# Patient Record
Sex: FEMALE | Race: WHITE | NOT HISPANIC OR LATINO
[De-identification: names, ages, dates, MRNs, and addresses within clinical notes are randomized per-mention and may not be internally consistent; named-entity substitution may affect disease eponyms.]

---

## 2017-01-05 ENCOUNTER — RX RENEWAL (OUTPATIENT)
Age: 82
End: 2017-01-05

## 2017-01-09 ENCOUNTER — LABORATORY RESULT (OUTPATIENT)
Age: 82
End: 2017-01-09

## 2017-01-30 ENCOUNTER — MEDICATION RENEWAL (OUTPATIENT)
Age: 82
End: 2017-01-30

## 2017-02-02 ENCOUNTER — MEDICATION RENEWAL (OUTPATIENT)
Age: 82
End: 2017-02-02

## 2017-02-08 ENCOUNTER — RESULT REVIEW (OUTPATIENT)
Age: 82
End: 2017-02-08

## 2017-02-08 ENCOUNTER — LABORATORY RESULT (OUTPATIENT)
Age: 82
End: 2017-02-08

## 2017-02-08 LAB
INR PPP: 1.8
PT BLD: 21.9

## 2017-03-01 ENCOUNTER — NON-APPOINTMENT (OUTPATIENT)
Age: 82
End: 2017-03-01

## 2017-03-01 ENCOUNTER — APPOINTMENT (OUTPATIENT)
Dept: CARDIOLOGY | Facility: CLINIC | Age: 82
End: 2017-03-01

## 2017-03-01 VITALS
BODY MASS INDEX: 26.46 KG/M2 | WEIGHT: 155 LBS | SYSTOLIC BLOOD PRESSURE: 110 MMHG | HEIGHT: 64 IN | TEMPERATURE: 98.8 F | HEART RATE: 56 BPM | OXYGEN SATURATION: 94 % | DIASTOLIC BLOOD PRESSURE: 56 MMHG

## 2017-03-08 ENCOUNTER — LABORATORY RESULT (OUTPATIENT)
Age: 82
End: 2017-03-08

## 2017-03-09 LAB — INR PPP: 2.5

## 2017-04-05 ENCOUNTER — LABORATORY RESULT (OUTPATIENT)
Age: 82
End: 2017-04-05

## 2017-04-23 ENCOUNTER — RX RENEWAL (OUTPATIENT)
Age: 82
End: 2017-04-23

## 2017-05-08 ENCOUNTER — LABORATORY RESULT (OUTPATIENT)
Age: 82
End: 2017-05-08

## 2017-05-12 DIAGNOSIS — R07.9 CHEST PAIN, UNSPECIFIED: ICD-10-CM

## 2017-06-20 LAB — INR PPP: 2

## 2017-07-18 ENCOUNTER — APPOINTMENT (OUTPATIENT)
Dept: CARDIOLOGY | Facility: CLINIC | Age: 82
End: 2017-07-18

## 2017-07-18 VITALS
SYSTOLIC BLOOD PRESSURE: 150 MMHG | HEART RATE: 59 BPM | WEIGHT: 155 LBS | HEIGHT: 64 IN | DIASTOLIC BLOOD PRESSURE: 72 MMHG | OXYGEN SATURATION: 98 % | BODY MASS INDEX: 26.46 KG/M2 | TEMPERATURE: 98.8 F

## 2017-07-18 LAB — INR PPP: 2 RATIO

## 2017-08-18 ENCOUNTER — LABORATORY RESULT (OUTPATIENT)
Age: 82
End: 2017-08-18

## 2017-08-23 ENCOUNTER — OUTPATIENT (OUTPATIENT)
Dept: OUTPATIENT SERVICES | Facility: HOSPITAL | Age: 82
LOS: 1 days | End: 2017-08-23
Payer: MEDICARE

## 2017-08-23 ENCOUNTER — APPOINTMENT (OUTPATIENT)
Dept: MAMMOGRAPHY | Facility: HOSPITAL | Age: 82
End: 2017-08-23
Payer: MEDICARE

## 2017-08-23 ENCOUNTER — APPOINTMENT (OUTPATIENT)
Dept: ULTRASOUND IMAGING | Facility: HOSPITAL | Age: 82
End: 2017-08-23
Payer: MEDICARE

## 2017-08-23 PROCEDURE — 77063 BREAST TOMOSYNTHESIS BI: CPT | Mod: 26

## 2017-08-23 PROCEDURE — 77067 SCR MAMMO BI INCL CAD: CPT

## 2017-08-23 PROCEDURE — 76641 ULTRASOUND BREAST COMPLETE: CPT

## 2017-08-23 PROCEDURE — G0202: CPT | Mod: 26

## 2017-08-23 PROCEDURE — 76641 ULTRASOUND BREAST COMPLETE: CPT | Mod: 26

## 2017-08-23 PROCEDURE — 77063 BREAST TOMOSYNTHESIS BI: CPT

## 2017-09-18 ENCOUNTER — LABORATORY RESULT (OUTPATIENT)
Age: 82
End: 2017-09-18

## 2017-10-19 ENCOUNTER — LABORATORY RESULT (OUTPATIENT)
Age: 82
End: 2017-10-19

## 2017-10-23 ENCOUNTER — RX RENEWAL (OUTPATIENT)
Age: 82
End: 2017-10-23

## 2017-11-01 ENCOUNTER — APPOINTMENT (OUTPATIENT)
Dept: CARDIOLOGY | Facility: CLINIC | Age: 82
End: 2017-11-01
Payer: MEDICARE

## 2017-11-01 ENCOUNTER — NON-APPOINTMENT (OUTPATIENT)
Age: 82
End: 2017-11-01

## 2017-11-01 VITALS
HEIGHT: 64 IN | HEART RATE: 66 BPM | DIASTOLIC BLOOD PRESSURE: 70 MMHG | WEIGHT: 158 LBS | BODY MASS INDEX: 26.98 KG/M2 | OXYGEN SATURATION: 99 % | SYSTOLIC BLOOD PRESSURE: 119 MMHG | TEMPERATURE: 98.2 F

## 2017-11-01 PROCEDURE — 99214 OFFICE O/P EST MOD 30 MIN: CPT

## 2017-11-01 PROCEDURE — 93000 ELECTROCARDIOGRAM COMPLETE: CPT

## 2017-11-02 LAB
ALBUMIN SERPL ELPH-MCNC: 4.3 G/DL
ALP BLD-CCNC: 86 U/L
ALT SERPL-CCNC: 30 U/L
ANION GAP SERPL CALC-SCNC: 16 MMOL/L
AST SERPL-CCNC: 41 U/L
BASOPHILS # BLD AUTO: 0.03 K/UL
BASOPHILS NFR BLD AUTO: 0.5 %
BILIRUB SERPL-MCNC: 1.1 MG/DL
BUN SERPL-MCNC: 18 MG/DL
CALCIUM SERPL-MCNC: 10.1 MG/DL
CHLORIDE SERPL-SCNC: 100 MMOL/L
CHOLEST SERPL-MCNC: 224 MG/DL
CHOLEST/HDLC SERPL: 3.5 RATIO
CO2 SERPL-SCNC: 24 MMOL/L
CREAT SERPL-MCNC: 1.04 MG/DL
EOSINOPHIL # BLD AUTO: 0.13 K/UL
EOSINOPHIL NFR BLD AUTO: 2 %
GLUCOSE SERPL-MCNC: 89 MG/DL
HBA1C MFR BLD HPLC: 5.9 %
HCT VFR BLD CALC: 40 %
HDLC SERPL-MCNC: 64 MG/DL
HGB BLD-MCNC: 13 G/DL
IMM GRANULOCYTES NFR BLD AUTO: 0.2 %
LDLC SERPL CALC-MCNC: 124 MG/DL
LYMPHOCYTES # BLD AUTO: 1.89 K/UL
LYMPHOCYTES NFR BLD AUTO: 28.9 %
MAN DIFF?: NORMAL
MCHC RBC-ENTMCNC: 31.1 PG
MCHC RBC-ENTMCNC: 32.5 GM/DL
MCV RBC AUTO: 95.7 FL
MONOCYTES # BLD AUTO: 0.72 K/UL
MONOCYTES NFR BLD AUTO: 11 %
NEUTROPHILS # BLD AUTO: 3.75 K/UL
NEUTROPHILS NFR BLD AUTO: 57.4 %
PLATELET # BLD AUTO: 206 K/UL
POTASSIUM SERPL-SCNC: 5.2 MMOL/L
PROT SERPL-MCNC: 7.3 G/DL
RBC # BLD: 4.18 M/UL
RBC # FLD: 15 %
SODIUM SERPL-SCNC: 140 MMOL/L
TRIGL SERPL-MCNC: 182 MG/DL
TSH SERPL-ACNC: 1.61 UIU/ML
WBC # FLD AUTO: 6.53 K/UL

## 2017-11-15 ENCOUNTER — LABORATORY RESULT (OUTPATIENT)
Age: 82
End: 2017-11-15

## 2017-12-14 ENCOUNTER — RX RENEWAL (OUTPATIENT)
Age: 82
End: 2017-12-14

## 2017-12-19 ENCOUNTER — LABORATORY RESULT (OUTPATIENT)
Age: 82
End: 2017-12-19

## 2018-01-16 ENCOUNTER — LABORATORY RESULT (OUTPATIENT)
Age: 83
End: 2018-01-16

## 2018-01-22 ENCOUNTER — RX RENEWAL (OUTPATIENT)
Age: 83
End: 2018-01-22

## 2018-02-14 ENCOUNTER — LABORATORY RESULT (OUTPATIENT)
Age: 83
End: 2018-02-14

## 2018-03-06 ENCOUNTER — APPOINTMENT (OUTPATIENT)
Dept: CARDIOLOGY | Facility: CLINIC | Age: 83
End: 2018-03-06
Payer: MEDICARE

## 2018-03-06 ENCOUNTER — NON-APPOINTMENT (OUTPATIENT)
Age: 83
End: 2018-03-06

## 2018-03-06 VITALS
HEIGHT: 64 IN | TEMPERATURE: 99 F | HEART RATE: 61 BPM | DIASTOLIC BLOOD PRESSURE: 64 MMHG | OXYGEN SATURATION: 98 % | SYSTOLIC BLOOD PRESSURE: 146 MMHG | WEIGHT: 160 LBS | BODY MASS INDEX: 27.31 KG/M2

## 2018-03-06 VITALS — SYSTOLIC BLOOD PRESSURE: 140 MMHG | DIASTOLIC BLOOD PRESSURE: 80 MMHG

## 2018-03-06 LAB — INR PPP: 3.7 RATIO

## 2018-03-06 PROCEDURE — 93000 ELECTROCARDIOGRAM COMPLETE: CPT

## 2018-03-06 PROCEDURE — 99214 OFFICE O/P EST MOD 30 MIN: CPT

## 2018-03-06 PROCEDURE — 85610 PROTHROMBIN TIME: CPT | Mod: QW

## 2018-03-06 PROCEDURE — 36416 COLLJ CAPILLARY BLOOD SPEC: CPT

## 2018-03-13 ENCOUNTER — LABORATORY RESULT (OUTPATIENT)
Age: 83
End: 2018-03-13

## 2018-04-13 ENCOUNTER — LABORATORY RESULT (OUTPATIENT)
Age: 83
End: 2018-04-13

## 2018-04-16 ENCOUNTER — RX RENEWAL (OUTPATIENT)
Age: 83
End: 2018-04-16

## 2018-04-24 ENCOUNTER — MEDICATION RENEWAL (OUTPATIENT)
Age: 83
End: 2018-04-24

## 2018-04-26 ENCOUNTER — MEDICATION RENEWAL (OUTPATIENT)
Age: 83
End: 2018-04-26

## 2018-05-09 ENCOUNTER — LABORATORY RESULT (OUTPATIENT)
Age: 83
End: 2018-05-09

## 2018-06-12 ENCOUNTER — LABORATORY RESULT (OUTPATIENT)
Age: 83
End: 2018-06-12

## 2018-07-06 ENCOUNTER — MEDICATION RENEWAL (OUTPATIENT)
Age: 83
End: 2018-07-06

## 2018-07-11 ENCOUNTER — LABORATORY RESULT (OUTPATIENT)
Age: 83
End: 2018-07-11

## 2018-07-26 ENCOUNTER — APPOINTMENT (OUTPATIENT)
Dept: CARDIOLOGY | Facility: CLINIC | Age: 83
End: 2018-07-26
Payer: MEDICARE

## 2018-07-26 ENCOUNTER — NON-APPOINTMENT (OUTPATIENT)
Age: 83
End: 2018-07-26

## 2018-07-26 VITALS
HEIGHT: 64 IN | OXYGEN SATURATION: 95 % | WEIGHT: 154 LBS | BODY MASS INDEX: 26.29 KG/M2 | HEART RATE: 66 BPM | DIASTOLIC BLOOD PRESSURE: 72 MMHG | SYSTOLIC BLOOD PRESSURE: 120 MMHG

## 2018-07-26 PROCEDURE — 85610 PROTHROMBIN TIME: CPT | Mod: QW

## 2018-07-26 PROCEDURE — 93306 TTE W/DOPPLER COMPLETE: CPT

## 2018-07-26 PROCEDURE — 99214 OFFICE O/P EST MOD 30 MIN: CPT

## 2018-07-26 PROCEDURE — 93000 ELECTROCARDIOGRAM COMPLETE: CPT

## 2018-07-26 PROCEDURE — 36416 COLLJ CAPILLARY BLOOD SPEC: CPT

## 2018-08-06 ENCOUNTER — OUTPATIENT (OUTPATIENT)
Dept: OUTPATIENT SERVICES | Facility: HOSPITAL | Age: 83
LOS: 1 days | End: 2018-08-06
Payer: MEDICARE

## 2018-08-06 DIAGNOSIS — R73.03 PREDIABETES: ICD-10-CM

## 2018-08-06 PROCEDURE — G0463: CPT

## 2018-08-28 ENCOUNTER — APPOINTMENT (OUTPATIENT)
Dept: MAMMOGRAPHY | Facility: HOSPITAL | Age: 83
End: 2018-08-28
Payer: MEDICARE

## 2018-08-28 ENCOUNTER — LABORATORY RESULT (OUTPATIENT)
Age: 83
End: 2018-08-28

## 2018-08-28 ENCOUNTER — OUTPATIENT (OUTPATIENT)
Dept: OUTPATIENT SERVICES | Facility: HOSPITAL | Age: 83
LOS: 1 days | End: 2018-08-28
Payer: MEDICARE

## 2018-08-28 ENCOUNTER — APPOINTMENT (OUTPATIENT)
Dept: ULTRASOUND IMAGING | Facility: HOSPITAL | Age: 83
End: 2018-08-28
Payer: MEDICARE

## 2018-08-28 DIAGNOSIS — Z00.8 ENCOUNTER FOR OTHER GENERAL EXAMINATION: ICD-10-CM

## 2018-08-28 PROCEDURE — 76641 ULTRASOUND BREAST COMPLETE: CPT | Mod: 26

## 2018-08-28 PROCEDURE — 77067 SCR MAMMO BI INCL CAD: CPT

## 2018-08-28 PROCEDURE — 77063 BREAST TOMOSYNTHESIS BI: CPT | Mod: 26

## 2018-08-28 PROCEDURE — 77067 SCR MAMMO BI INCL CAD: CPT | Mod: 26

## 2018-08-28 PROCEDURE — 77063 BREAST TOMOSYNTHESIS BI: CPT

## 2018-08-28 PROCEDURE — 76641 ULTRASOUND BREAST COMPLETE: CPT

## 2018-09-21 ENCOUNTER — APPOINTMENT (OUTPATIENT)
Dept: ORTHOPEDIC SURGERY | Facility: CLINIC | Age: 83
End: 2018-09-21
Payer: MEDICARE

## 2018-09-21 ENCOUNTER — LABORATORY RESULT (OUTPATIENT)
Age: 83
End: 2018-09-21

## 2018-09-21 VITALS — WEIGHT: 154 LBS | BODY MASS INDEX: 26.29 KG/M2 | HEIGHT: 64 IN

## 2018-09-21 DIAGNOSIS — M19.011 PRIMARY OSTEOARTHRITIS, RIGHT SHOULDER: ICD-10-CM

## 2018-09-21 DIAGNOSIS — M46.92 UNSPECIFIED INFLAMMATORY SPONDYLOPATHY, CERVICAL REGION: ICD-10-CM

## 2018-09-21 PROCEDURE — 99204 OFFICE O/P NEW MOD 45 MIN: CPT

## 2018-09-21 PROCEDURE — 73030 X-RAY EXAM OF SHOULDER: CPT | Mod: RT

## 2018-09-21 PROCEDURE — 99214 OFFICE O/P EST MOD 30 MIN: CPT

## 2018-11-07 ENCOUNTER — APPOINTMENT (OUTPATIENT)
Dept: CARDIOLOGY | Facility: CLINIC | Age: 83
End: 2018-11-07
Payer: MEDICARE

## 2018-11-07 ENCOUNTER — NON-APPOINTMENT (OUTPATIENT)
Age: 83
End: 2018-11-07

## 2018-11-07 VITALS
HEART RATE: 67 BPM | SYSTOLIC BLOOD PRESSURE: 103 MMHG | WEIGHT: 148 LBS | BODY MASS INDEX: 25.4 KG/M2 | DIASTOLIC BLOOD PRESSURE: 60 MMHG | OXYGEN SATURATION: 97 %

## 2018-11-07 DIAGNOSIS — R92.2 INCONCLUSIVE MAMMOGRAM: ICD-10-CM

## 2018-11-07 PROCEDURE — 36415 COLL VENOUS BLD VENIPUNCTURE: CPT

## 2018-11-07 PROCEDURE — 99214 OFFICE O/P EST MOD 30 MIN: CPT

## 2018-11-07 PROCEDURE — 93000 ELECTROCARDIOGRAM COMPLETE: CPT

## 2018-11-07 NOTE — REVIEW OF SYSTEMS
[Palpitations] : palpitations [Joint Pain] : joint pain [Negative] : Heme/Lymph [Feeling Fatigued] : not feeling fatigued

## 2018-11-07 NOTE — HISTORY OF PRESENT ILLNESS
[FreeTextEntry1] : 83-year-old female with a history of CAD, paroxysmal atrial fibrillation, [employer] aortic valve replacement, hypertension, and hypercholesterolemia.  She is feeling generally well and has no cardiac complaints.

## 2018-11-07 NOTE — DISCUSSION/SUMMARY
[FreeTextEntry1] : Mrs. Gillespie has no complaints and seems unchanged. Her exam shows regular rhythm, normal blood pressure, clicks from her aortic prosthesis, and no evidence of CHF. Her EKG shows sinus rhythm with some APCs and low voltage. She is stable and doing well and I made no changes in her regimen. Blood work was drawn. She will follow in 4 months.

## 2018-11-07 NOTE — PHYSICAL EXAM
[General Appearance - Well Developed] : well developed [Normal Appearance] : normal appearance [Well Groomed] : well groomed [General Appearance - Well Nourished] : well nourished [No Deformities] : no deformities [General Appearance - In No Acute Distress] : no acute distress [Normal Conjunctiva] : the conjunctiva exhibited no abnormalities [Eyelids - No Xanthelasma] : the eyelids demonstrated no xanthelasmas [Normal Oral Mucosa] : normal oral mucosa [No Oral Pallor] : no oral pallor [No Oral Cyanosis] : no oral cyanosis [Normal Jugular Venous A Waves Present] : normal jugular venous A waves present [Normal Jugular Venous V Waves Present] : normal jugular venous V waves present [No Jugular Venous Webb A Waves] : no jugular venous webb A waves [Respiration, Rhythm And Depth] : normal respiratory rhythm and effort [Exaggerated Use Of Accessory Muscles For Inspiration] : no accessory muscle use [Auscultation Breath Sounds / Voice Sounds] : lungs were clear to auscultation bilaterally [Heart Rate And Rhythm] : heart rate and rhythm were normal [Heart Sounds] : normal S1 and S2 [Murmurs] : no murmurs present [Edema] : no peripheral edema present [Abdomen Soft] : soft [Abdomen Tenderness] : non-tender [Abdomen Mass (___ Cm)] : no abdominal mass palpated [Abnormal Walk] : normal gait [Gait - Sufficient For Exercise Testing] : the gait was sufficient for exercise testing [Nail Clubbing] : no clubbing of the fingernails [Cyanosis, Localized] : no localized cyanosis [Petechial Hemorrhages (___cm)] : no petechial hemorrhages [Skin Color & Pigmentation] : normal skin color and pigmentation [] : no rash [No Venous Stasis] : no venous stasis [Skin Lesions] : no skin lesions [No Skin Ulcers] : no skin ulcer [No Xanthoma] : no  xanthoma was observed [Oriented To Time, Place, And Person] : oriented to person, place, and time [Affect] : the affect was normal [Mood] : the mood was normal [No Anxiety] : not feeling anxious [FreeTextEntry1] : Closing click of [facility] prosthesis

## 2018-11-08 LAB
ALBUMIN SERPL ELPH-MCNC: 4.2 G/DL
ALP BLD-CCNC: 85 U/L
ALT SERPL-CCNC: 23 U/L
ANION GAP SERPL CALC-SCNC: 14 MMOL/L
AST SERPL-CCNC: 29 U/L
BASOPHILS # BLD AUTO: 0.03 K/UL
BASOPHILS NFR BLD AUTO: 0.5 %
BILIRUB SERPL-MCNC: 1 MG/DL
BUN SERPL-MCNC: 18 MG/DL
CALCIUM SERPL-MCNC: 9.9 MG/DL
CHLORIDE SERPL-SCNC: 101 MMOL/L
CHOLEST SERPL-MCNC: 185 MG/DL
CHOLEST/HDLC SERPL: 3.2 RATIO
CO2 SERPL-SCNC: 24 MMOL/L
CREAT SERPL-MCNC: 0.9 MG/DL
EOSINOPHIL # BLD AUTO: 0.13 K/UL
EOSINOPHIL NFR BLD AUTO: 2 %
GLUCOSE SERPL-MCNC: 107 MG/DL
HBA1C MFR BLD HPLC: 5.9 %
HCT VFR BLD CALC: 36.3 %
HDLC SERPL-MCNC: 57 MG/DL
HGB BLD-MCNC: 11.8 G/DL
IMM GRANULOCYTES NFR BLD AUTO: 0.2 %
INR PPP: 2.27 RATIO
LDLC SERPL CALC-MCNC: 106 MG/DL
LYMPHOCYTES # BLD AUTO: 1.75 K/UL
LYMPHOCYTES NFR BLD AUTO: 27.3 %
MAN DIFF?: NORMAL
MCHC RBC-ENTMCNC: 30.2 PG
MCHC RBC-ENTMCNC: 32.5 GM/DL
MCV RBC AUTO: 92.8 FL
MONOCYTES # BLD AUTO: 0.67 K/UL
MONOCYTES NFR BLD AUTO: 10.5 %
NEUTROPHILS # BLD AUTO: 3.81 K/UL
NEUTROPHILS NFR BLD AUTO: 59.5 %
PLATELET # BLD AUTO: 202 K/UL
POTASSIUM SERPL-SCNC: 4.9 MMOL/L
PROT SERPL-MCNC: 6.9 G/DL
PT BLD: 26.6 SEC
RBC # BLD: 3.91 M/UL
RBC # FLD: 14.9 %
SODIUM SERPL-SCNC: 139 MMOL/L
TRIGL SERPL-MCNC: 111 MG/DL
TSH SERPL-ACNC: 0.91 UIU/ML
WBC # FLD AUTO: 6.4 K/UL

## 2018-11-16 ENCOUNTER — APPOINTMENT (OUTPATIENT)
Dept: ORTHOPEDIC SURGERY | Facility: CLINIC | Age: 83
End: 2018-11-16
Payer: MEDICARE

## 2018-11-16 VITALS — HEIGHT: 64 IN | BODY MASS INDEX: 25.27 KG/M2 | WEIGHT: 148 LBS

## 2018-11-16 DIAGNOSIS — M19.019 PRIMARY OSTEOARTHRITIS, UNSPECIFIED SHOULDER: ICD-10-CM

## 2018-11-16 DIAGNOSIS — M75.41 IMPINGEMENT SYNDROME OF RIGHT SHOULDER: ICD-10-CM

## 2018-11-16 PROCEDURE — 73030 X-RAY EXAM OF SHOULDER: CPT | Mod: RT

## 2018-11-16 PROCEDURE — 99213 OFFICE O/P EST LOW 20 MIN: CPT | Mod: 25

## 2018-11-16 PROCEDURE — 20610 DRAIN/INJ JOINT/BURSA W/O US: CPT | Mod: RT

## 2018-12-10 ENCOUNTER — LABORATORY RESULT (OUTPATIENT)
Age: 83
End: 2018-12-10

## 2018-12-21 ENCOUNTER — APPOINTMENT (OUTPATIENT)
Dept: ORTHOPEDIC SURGERY | Facility: CLINIC | Age: 83
End: 2018-12-21

## 2018-12-21 ENCOUNTER — LABORATORY RESULT (OUTPATIENT)
Age: 83
End: 2018-12-21

## 2019-01-03 ENCOUNTER — RX RENEWAL (OUTPATIENT)
Age: 84
End: 2019-01-03

## 2019-01-18 ENCOUNTER — RX RENEWAL (OUTPATIENT)
Age: 84
End: 2019-01-18

## 2019-02-15 ENCOUNTER — MEDICATION RENEWAL (OUTPATIENT)
Age: 84
End: 2019-02-15

## 2019-03-04 ENCOUNTER — RESULT CHARGE (OUTPATIENT)
Age: 84
End: 2019-03-04

## 2019-03-05 ENCOUNTER — NON-APPOINTMENT (OUTPATIENT)
Age: 84
End: 2019-03-05

## 2019-03-05 ENCOUNTER — OTHER (OUTPATIENT)
Age: 84
End: 2019-03-05

## 2019-03-05 ENCOUNTER — CHART COPY (OUTPATIENT)
Age: 84
End: 2019-03-05

## 2019-03-05 ENCOUNTER — APPOINTMENT (OUTPATIENT)
Dept: CARDIOLOGY | Facility: CLINIC | Age: 84
End: 2019-03-05
Payer: MEDICARE

## 2019-03-05 ENCOUNTER — LABORATORY RESULT (OUTPATIENT)
Age: 84
End: 2019-03-05

## 2019-03-05 VITALS
OXYGEN SATURATION: 98 % | HEIGHT: 64 IN | BODY MASS INDEX: 24.41 KG/M2 | SYSTOLIC BLOOD PRESSURE: 120 MMHG | DIASTOLIC BLOOD PRESSURE: 67 MMHG | WEIGHT: 143 LBS | HEART RATE: 58 BPM

## 2019-03-05 VITALS — SYSTOLIC BLOOD PRESSURE: 130 MMHG | DIASTOLIC BLOOD PRESSURE: 70 MMHG

## 2019-03-05 PROCEDURE — 93000 ELECTROCARDIOGRAM COMPLETE: CPT

## 2019-03-05 PROCEDURE — 36415 COLL VENOUS BLD VENIPUNCTURE: CPT

## 2019-03-05 PROCEDURE — 99214 OFFICE O/P EST MOD 30 MIN: CPT

## 2019-03-05 RX ORDER — MECLIZINE HYDROCHLORIDE 12.5 MG/1
12.5 TABLET ORAL 3 TIMES DAILY
Qty: 25 | Refills: 3 | Status: ACTIVE | COMMUNITY
Start: 2017-11-01 | End: 1900-01-01

## 2019-03-05 NOTE — DISCUSSION/SUMMARY
[FreeTextEntry1] : Mrs. Gillespie remains without cardiac symptoms and is unchanged. Her exam shows normal blood pressure, clear lungs, and unchanged cardiac exam, and no evidence of CHF. Her EKG shows normal sinus rhythm with poor R-wave progression and is unchanged. She stabilized made no changes in her regimen. Blood work was drawn.  She will follow in 4 months.

## 2019-03-06 LAB
ANION GAP SERPL CALC-SCNC: 13 MMOL/L
BUN SERPL-MCNC: 16 MG/DL
CALCIUM SERPL-MCNC: 9.8 MG/DL
CHLORIDE SERPL-SCNC: 102 MMOL/L
CHOLEST SERPL-MCNC: 174 MG/DL
CHOLEST/HDLC SERPL: 3.6 RATIO
CO2 SERPL-SCNC: 25 MMOL/L
CREAT SERPL-MCNC: 0.92 MG/DL
GLUCOSE SERPL-MCNC: 106 MG/DL
HDLC SERPL-MCNC: 49 MG/DL
LDLC SERPL CALC-MCNC: 96 MG/DL
POTASSIUM SERPL-SCNC: 4.5 MMOL/L
SODIUM SERPL-SCNC: 140 MMOL/L
TRIGL SERPL-MCNC: 144 MG/DL
TSH SERPL-ACNC: 0.38 UIU/ML

## 2019-04-09 ENCOUNTER — LABORATORY RESULT (OUTPATIENT)
Age: 84
End: 2019-04-09

## 2019-04-12 ENCOUNTER — RX RENEWAL (OUTPATIENT)
Age: 84
End: 2019-04-12

## 2019-05-07 ENCOUNTER — LABORATORY RESULT (OUTPATIENT)
Age: 84
End: 2019-05-07

## 2019-06-05 ENCOUNTER — LABORATORY RESULT (OUTPATIENT)
Age: 84
End: 2019-06-05

## 2019-06-06 ENCOUNTER — RESULT REVIEW (OUTPATIENT)
Age: 84
End: 2019-06-06

## 2019-06-29 ENCOUNTER — RESULT REVIEW (OUTPATIENT)
Age: 84
End: 2019-06-29

## 2019-07-03 ENCOUNTER — RX RENEWAL (OUTPATIENT)
Age: 84
End: 2019-07-03

## 2019-07-09 ENCOUNTER — NON-APPOINTMENT (OUTPATIENT)
Age: 84
End: 2019-07-09

## 2019-07-09 ENCOUNTER — APPOINTMENT (OUTPATIENT)
Dept: CARDIOLOGY | Facility: CLINIC | Age: 84
End: 2019-07-09
Payer: MEDICARE

## 2019-07-09 VITALS
OXYGEN SATURATION: 97 % | SYSTOLIC BLOOD PRESSURE: 116 MMHG | DIASTOLIC BLOOD PRESSURE: 69 MMHG | RESPIRATION RATE: 17 BRPM | HEART RATE: 68 BPM | HEIGHT: 64 IN | WEIGHT: 141 LBS | BODY MASS INDEX: 24.07 KG/M2 | TEMPERATURE: 98.1 F

## 2019-07-09 PROCEDURE — 93000 ELECTROCARDIOGRAM COMPLETE: CPT

## 2019-07-09 PROCEDURE — 99214 OFFICE O/P EST MOD 30 MIN: CPT

## 2019-07-09 NOTE — PHYSICAL EXAM
[General Appearance - Well Developed] : well developed [Normal Appearance] : normal appearance [Well Groomed] : well groomed [No Deformities] : no deformities [General Appearance - In No Acute Distress] : no acute distress [General Appearance - Well Nourished] : well nourished [Normal Conjunctiva] : the conjunctiva exhibited no abnormalities [Normal Oral Mucosa] : normal oral mucosa [Eyelids - No Xanthelasma] : the eyelids demonstrated no xanthelasmas [No Oral Cyanosis] : no oral cyanosis [Normal Jugular Venous A Waves Present] : normal jugular venous A waves present [No Oral Pallor] : no oral pallor [No Jugular Venous Webb A Waves] : no jugular venous webb A waves [Normal Jugular Venous V Waves Present] : normal jugular venous V waves present [Respiration, Rhythm And Depth] : normal respiratory rhythm and effort [Auscultation Breath Sounds / Voice Sounds] : lungs were clear to auscultation bilaterally [Exaggerated Use Of Accessory Muscles For Inspiration] : no accessory muscle use [Murmurs] : no murmurs present [Heart Sounds] : normal S1 and S2 [Heart Rate And Rhythm] : heart rate and rhythm were normal [Edema] : no peripheral edema present [Abdomen Soft] : soft [Abdomen Tenderness] : non-tender [Abdomen Mass (___ Cm)] : no abdominal mass palpated [Gait - Sufficient For Exercise Testing] : the gait was sufficient for exercise testing [Abnormal Walk] : normal gait [Nail Clubbing] : no clubbing of the fingernails [Cyanosis, Localized] : no localized cyanosis [Petechial Hemorrhages (___cm)] : no petechial hemorrhages [Skin Color & Pigmentation] : normal skin color and pigmentation [] : no ischemic changes [No Skin Ulcers] : no skin ulcer [Skin Lesions] : no skin lesions [No Venous Stasis] : no venous stasis [Oriented To Time, Place, And Person] : oriented to person, place, and time [No Xanthoma] : no  xanthoma was observed [Mood] : the mood was normal [Affect] : the affect was normal [No Anxiety] : not feeling anxious [FreeTextEntry1] : Closing click of [facility] prosthesis

## 2019-07-09 NOTE — HISTORY OF PRESENT ILLNESS
[FreeTextEntry1] : 84-year-old female with a [employer] aortic valve replacement, CAD, and paroxysmal atrial fibrillation.

## 2019-07-09 NOTE — DISCUSSION/SUMMARY
[FreeTextEntry1] : Mrs. Gillespie remains well with no cardiac symptoms. Her exam shows normal blood pressure, regular rhythm, an unchanged center of her valve closing, and no evidence of CHF. An EKG is within normal limits. She brought blood from her internist which shows good control of her lipids and no significant problems. She is doing well I made no change in her regimen. She'll followup in 4 months.

## 2019-07-09 NOTE — REVIEW OF SYSTEMS
[Joint Pain] : joint pain [Palpitations] : palpitations [Negative] : Psychiatric [Feeling Fatigued] : not feeling fatigued

## 2019-07-22 ENCOUNTER — LABORATORY RESULT (OUTPATIENT)
Age: 84
End: 2019-07-22

## 2019-07-29 ENCOUNTER — LABORATORY RESULT (OUTPATIENT)
Age: 84
End: 2019-07-29

## 2019-08-23 ENCOUNTER — LABORATORY RESULT (OUTPATIENT)
Age: 84
End: 2019-08-23

## 2019-08-28 ENCOUNTER — OTHER (OUTPATIENT)
Age: 84
End: 2019-08-28

## 2019-08-29 ENCOUNTER — OUTPATIENT (OUTPATIENT)
Dept: OUTPATIENT SERVICES | Facility: HOSPITAL | Age: 84
LOS: 1 days | End: 2019-08-29
Payer: MEDICARE

## 2019-08-29 ENCOUNTER — APPOINTMENT (OUTPATIENT)
Dept: MAMMOGRAPHY | Facility: HOSPITAL | Age: 84
End: 2019-08-29
Payer: MEDICARE

## 2019-08-29 ENCOUNTER — APPOINTMENT (OUTPATIENT)
Dept: ULTRASOUND IMAGING | Facility: HOSPITAL | Age: 84
End: 2019-08-29
Payer: MEDICARE

## 2019-08-29 DIAGNOSIS — Z00.8 ENCOUNTER FOR OTHER GENERAL EXAMINATION: ICD-10-CM

## 2019-08-29 PROCEDURE — 77063 BREAST TOMOSYNTHESIS BI: CPT

## 2019-08-29 PROCEDURE — 76641 ULTRASOUND BREAST COMPLETE: CPT | Mod: 26,50

## 2019-08-29 PROCEDURE — 77063 BREAST TOMOSYNTHESIS BI: CPT | Mod: 26

## 2019-08-29 PROCEDURE — 77067 SCR MAMMO BI INCL CAD: CPT

## 2019-08-29 PROCEDURE — 77067 SCR MAMMO BI INCL CAD: CPT | Mod: 26

## 2019-08-29 PROCEDURE — 76641 ULTRASOUND BREAST COMPLETE: CPT

## 2019-09-05 ENCOUNTER — LABORATORY RESULT (OUTPATIENT)
Age: 84
End: 2019-09-05

## 2019-10-08 LAB — INR PPP: 1.7 RATIO

## 2019-10-18 LAB — INR PPP: 1.7 RATIO

## 2019-11-06 ENCOUNTER — NON-APPOINTMENT (OUTPATIENT)
Age: 84
End: 2019-11-06

## 2019-11-06 ENCOUNTER — APPOINTMENT (OUTPATIENT)
Dept: CARDIOLOGY | Facility: CLINIC | Age: 84
End: 2019-11-06
Payer: MEDICARE

## 2019-11-06 VITALS
DIASTOLIC BLOOD PRESSURE: 70 MMHG | OXYGEN SATURATION: 98 % | BODY MASS INDEX: 23.73 KG/M2 | HEART RATE: 58 BPM | WEIGHT: 139 LBS | SYSTOLIC BLOOD PRESSURE: 126 MMHG | HEIGHT: 64 IN

## 2019-11-06 LAB — INR PPP: 1.8 RATIO

## 2019-11-06 PROCEDURE — 36415 COLL VENOUS BLD VENIPUNCTURE: CPT

## 2019-11-06 PROCEDURE — 93000 ELECTROCARDIOGRAM COMPLETE: CPT

## 2019-11-06 PROCEDURE — 85610 PROTHROMBIN TIME: CPT | Mod: QW

## 2019-11-06 PROCEDURE — 99214 OFFICE O/P EST MOD 30 MIN: CPT

## 2019-11-06 PROCEDURE — 36416 COLLJ CAPILLARY BLOOD SPEC: CPT

## 2019-11-06 NOTE — HISTORY OF PRESENT ILLNESS
[FreeTextEntry1] : 84-year old female with a history of [facility] [facility] aortic valve replacement, CAD, and hypercholesterolemia returns for follow-up.  She remains feeling generally well and has no new complaints.  She has not had any recent palpitations

## 2019-11-06 NOTE — DISCUSSION/SUMMARY
[FreeTextEntry1] : Mrs.: Has no new complaints and looks unchanged.  Her exam shows no change in her weight, normal blood pressure, and unchanged cardiac exam, and no evidence of CHF.  Her EKG shows low voltage and is unchanged.  Complete blood work was drawn.  Her INR has been running low so her Coumadin dose was increased to 4.5 mg every day.  She will have a repeat pro time next week and follow-up in 4 months.

## 2019-11-07 LAB
ALBUMIN SERPL ELPH-MCNC: 4.3 G/DL
ALP BLD-CCNC: 73 U/L
ALT SERPL-CCNC: 26 U/L
ANION GAP SERPL CALC-SCNC: 11 MMOL/L
AST SERPL-CCNC: 29 U/L
BASOPHILS # BLD AUTO: 0.04 K/UL
BASOPHILS NFR BLD AUTO: 0.6 %
BILIRUB SERPL-MCNC: 1.2 MG/DL
BUN SERPL-MCNC: 18 MG/DL
CALCIUM SERPL-MCNC: 9.7 MG/DL
CHLORIDE SERPL-SCNC: 101 MMOL/L
CHOLEST SERPL-MCNC: 176 MG/DL
CHOLEST/HDLC SERPL: 2.9 RATIO
CO2 SERPL-SCNC: 27 MMOL/L
CREAT SERPL-MCNC: 1 MG/DL
EOSINOPHIL # BLD AUTO: 0.13 K/UL
EOSINOPHIL NFR BLD AUTO: 1.9 %
ESTIMATED AVERAGE GLUCOSE: 123 MG/DL
GLUCOSE SERPL-MCNC: 93 MG/DL
HBA1C MFR BLD HPLC: 5.9 %
HCT VFR BLD CALC: 38.5 %
HDLC SERPL-MCNC: 60 MG/DL
HGB BLD-MCNC: 12.3 G/DL
IMM GRANULOCYTES NFR BLD AUTO: 0.1 %
LDLC SERPL CALC-MCNC: 93 MG/DL
LYMPHOCYTES # BLD AUTO: 1.66 K/UL
LYMPHOCYTES NFR BLD AUTO: 23.7 %
MAN DIFF?: NORMAL
MCHC RBC-ENTMCNC: 31.3 PG
MCHC RBC-ENTMCNC: 31.9 GM/DL
MCV RBC AUTO: 98 FL
MONOCYTES # BLD AUTO: 0.7 K/UL
MONOCYTES NFR BLD AUTO: 10 %
NEUTROPHILS # BLD AUTO: 4.45 K/UL
NEUTROPHILS NFR BLD AUTO: 63.7 %
PLATELET # BLD AUTO: 189 K/UL
POTASSIUM SERPL-SCNC: 4.9 MMOL/L
PROT SERPL-MCNC: 6.7 G/DL
RBC # BLD: 3.93 M/UL
RBC # FLD: 14.6 %
SODIUM SERPL-SCNC: 139 MMOL/L
TRIGL SERPL-MCNC: 113 MG/DL
TSH SERPL-ACNC: 1.06 UIU/ML
WBC # FLD AUTO: 6.99 K/UL

## 2019-11-12 ENCOUNTER — MEDICATION RENEWAL (OUTPATIENT)
Age: 84
End: 2019-11-12

## 2019-11-26 LAB
INR PPP: 2.1
PROTHROMBIN TIME COMMENT: NORMAL

## 2019-12-12 ENCOUNTER — MEDICATION RENEWAL (OUTPATIENT)
Age: 84
End: 2019-12-12

## 2019-12-12 ENCOUNTER — LABORATORY RESULT (OUTPATIENT)
Age: 84
End: 2019-12-12

## 2019-12-20 ENCOUNTER — TRANSCRIPTION ENCOUNTER (OUTPATIENT)
Age: 84
End: 2019-12-20

## 2019-12-24 ENCOUNTER — APPOINTMENT (OUTPATIENT)
Dept: ORTHOPEDIC SURGERY | Facility: CLINIC | Age: 84
End: 2019-12-24
Payer: MEDICARE

## 2019-12-24 DIAGNOSIS — S80.01XA CONTUSION OF RIGHT KNEE, INITIAL ENCOUNTER: ICD-10-CM

## 2019-12-24 DIAGNOSIS — S60.011A CONTUSION OF RIGHT THUMB W/OUT DAMAGE TO NAIL, INITIAL ENCOUNTER: ICD-10-CM

## 2019-12-24 PROCEDURE — 99213 OFFICE O/P EST LOW 20 MIN: CPT

## 2019-12-24 RX ORDER — CLINDAMYCIN HYDROCHLORIDE 150 MG/1
150 CAPSULE ORAL
Qty: 20 | Refills: 0 | Status: ACTIVE | COMMUNITY
Start: 2019-12-09

## 2019-12-24 NOTE — PHYSICAL EXAM
[de-identified] : Patient is WDWN, alert, and in no acute distress. Breathing is unlabored. She is grossly oriented to person, place, and time. \par \par Right Hand: There is generalized pain about the thumb. She has full arc of motion in the fingers with pain on thumb ROM. All intrinsic and extrinsic hand muscles 5/5. No joint instability on provocative testing. Sensation is intact to light touch. There are no skin lesions or discoloration. \par Stability: The right thumb was stabilized at the MCP joint with radial and ulnar stress applied. The ligaments are not loose or unstable and appear to be intact. \par \par Right knee: Small superficial abrasion present over the anterior surface of the knee. There is slight tenderness to palpation over the lateral aspect. No swelling, no valgus or valgus instability present on provocative testing. Flexion and extension 5/5.\par Range of motion: Active flexion and extension full. \par Tests and Signs: All tests for stability are normal. \par Strength: flexion and extension 5/5  [de-identified] : X-rays of the right hand on 12/20/2019 at an Urgent Care center showed no acute fracture. No dislocation. Cartilage space narrowing with central \par erosion in the DIP joint of the ring finger. Nonuniform cartilage space narrowing with marginal osteophyte formation in the DIP joints of the index, \par long, and small fingers.\par \par X-rays of the right knee on 12/20/2019 at an Urgent Care center showed no acute fracture or dislocation. There is chondrocalcinosis. Enthesopathy and mineralization in the distal quadriceps tendon, which may be sequelae of old injury. \par

## 2019-12-24 NOTE — DISCUSSION/SUMMARY
[de-identified] : The underlying pathophysiology was reviewed with the patient. Treatment options were discussed including; observation, NSAIDS, analgesics, injection(s), physical therapy. \par \par The patient was advised to soak the hand in warm water and Epsom salt. \par Topical analgesics as needed. \par NSAIDs as tolerated. \par Follow up if the pain persists or worsens.

## 2019-12-24 NOTE — CONSULT LETTER
[Dear  ___] : Dear  [unfilled], [Consult Letter:] : I had the pleasure of evaluating your patient, [unfilled]. [Please see my note below.] : Please see my note below. [Consult Closing:] : Thank you very much for allowing me to participate in the care of this patient.  If you have any questions, please do not hesitate to contact me. [Sincerely,] : Sincerely, [FreeTextEntry2] : FELIX DAVE  [FreeTextEntry3] : Adi Sung MD

## 2019-12-24 NOTE — ADDENDUM
[FreeTextEntry1] : I, Kamila Gusman wrote this note acting as a scribe for Dr. Adi Sung on Dec 24, 2019.

## 2019-12-24 NOTE — END OF VISIT
[FreeTextEntry3] : I, Adi Sung MD, ordering physician, have read and attest that all the information, medical decision making and discharge instructions within are true and accurate.

## 2019-12-24 NOTE — HISTORY OF PRESENT ILLNESS
[de-identified] : Patient is a RHD 84 year female who presents c/o pain involving the right thumb and knee after a fall that occurred on 12/19/2019. She tripped and landed with direct impact onto the anterior right knee and the right outstretched hand. There was pain and swelling immediately in both. She additionally sustained an abrasion to the knee. The pain progressed so she followed up at an Urgent Care the following day where x-rays of both were taken and read negative for acute fracture or dislocation. Since then, she has been with residual pain, tenderness and soreness. She rates her symptom severity as 6/10. She has pain when she flexes the knee. She is managing with Tylenol as she is unable to tolerate NSAIDs. She denies development of numbness or paresthesias in the right hand following the accident.

## 2020-01-06 ENCOUNTER — LABORATORY RESULT (OUTPATIENT)
Age: 85
End: 2020-01-06

## 2020-02-03 ENCOUNTER — LABORATORY RESULT (OUTPATIENT)
Age: 85
End: 2020-02-03

## 2020-02-25 ENCOUNTER — RX RENEWAL (OUTPATIENT)
Age: 85
End: 2020-02-25

## 2020-03-03 ENCOUNTER — NON-APPOINTMENT (OUTPATIENT)
Age: 85
End: 2020-03-03

## 2020-03-03 ENCOUNTER — APPOINTMENT (OUTPATIENT)
Dept: CARDIOLOGY | Facility: CLINIC | Age: 85
End: 2020-03-03
Payer: MEDICARE

## 2020-03-03 VITALS
BODY MASS INDEX: 23.9 KG/M2 | HEART RATE: 56 BPM | HEIGHT: 64 IN | WEIGHT: 140 LBS | DIASTOLIC BLOOD PRESSURE: 65 MMHG | OXYGEN SATURATION: 96 % | SYSTOLIC BLOOD PRESSURE: 135 MMHG

## 2020-03-03 PROCEDURE — 36415 COLL VENOUS BLD VENIPUNCTURE: CPT

## 2020-03-03 PROCEDURE — 99214 OFFICE O/P EST MOD 30 MIN: CPT

## 2020-03-03 PROCEDURE — 93000 ELECTROCARDIOGRAM COMPLETE: CPT

## 2020-03-03 NOTE — REVIEW OF SYSTEMS
[Feeling Fatigued] : not feeling fatigued [Palpitations] : palpitations [Joint Pain] : joint pain [Negative] : Heme/Lymph

## 2020-03-03 NOTE — PHYSICAL EXAM
[General Appearance - Well Developed] : well developed [Normal Appearance] : normal appearance [Well Groomed] : well groomed [General Appearance - Well Nourished] : well nourished [No Deformities] : no deformities [General Appearance - In No Acute Distress] : no acute distress [Normal Conjunctiva] : the conjunctiva exhibited no abnormalities [Eyelids - No Xanthelasma] : the eyelids demonstrated no xanthelasmas [Normal Oral Mucosa] : normal oral mucosa [No Oral Pallor] : no oral pallor [No Oral Cyanosis] : no oral cyanosis [Normal Jugular Venous V Waves Present] : normal jugular venous V waves present [Normal Jugular Venous A Waves Present] : normal jugular venous A waves present [No Jugular Venous Webb A Waves] : no jugular venous webb A waves [Respiration, Rhythm And Depth] : normal respiratory rhythm and effort [Exaggerated Use Of Accessory Muscles For Inspiration] : no accessory muscle use [Auscultation Breath Sounds / Voice Sounds] : lungs were clear to auscultation bilaterally [Heart Rate And Rhythm] : heart rate and rhythm were normal [Heart Sounds] : normal S1 and S2 [Murmurs] : no murmurs present [Edema] : no peripheral edema present [FreeTextEntry1] : Closing click of [facility] [facility] prosthesis [Abdomen Soft] : soft [Abdomen Mass (___ Cm)] : no abdominal mass palpated [Abdomen Tenderness] : non-tender [Gait - Sufficient For Exercise Testing] : the gait was sufficient for exercise testing [Abnormal Walk] : normal gait [Nail Clubbing] : no clubbing of the fingernails [Cyanosis, Localized] : no localized cyanosis [Petechial Hemorrhages (___cm)] : no petechial hemorrhages [] : no rash [Skin Color & Pigmentation] : normal skin color and pigmentation [No Venous Stasis] : no venous stasis [No Skin Ulcers] : no skin ulcer [Skin Lesions] : no skin lesions [No Xanthoma] : no  xanthoma was observed [Oriented To Time, Place, And Person] : oriented to person, place, and time [Affect] : the affect was normal [Mood] : the mood was normal [No Anxiety] : not feeling anxious

## 2020-03-03 NOTE — DISCUSSION/SUMMARY
[FreeTextEntry1] : Mrs. Gillespie has no complaints and looks unchanged.  Her exam shows regular rhythm, normal blood pressure, a closing click from her aortic prosthesis, and no evidence of CHF.  Her EKG shows sinus rhythm and is unchanged.  She is stable and no change was made in her regimen.  Blood was drawn to check her lipids and INR.  She will follow-up in 6 months.

## 2020-03-03 NOTE — HISTORY OF PRESENT ILLNESS
[FreeTextEntry1] : 84-year old female with a history of mechanical aortic valve replacement and coronary artery disease who returns for routine follow-up.  She has no complaints.

## 2020-03-04 LAB
ALBUMIN SERPL ELPH-MCNC: 4.3 G/DL
ALP BLD-CCNC: 83 U/L
ALT SERPL-CCNC: 25 U/L
ANION GAP SERPL CALC-SCNC: 13 MMOL/L
AST SERPL-CCNC: 34 U/L
BILIRUB SERPL-MCNC: 0.9 MG/DL
BUN SERPL-MCNC: 17 MG/DL
CALCIUM SERPL-MCNC: 9.9 MG/DL
CHLORIDE SERPL-SCNC: 104 MMOL/L
CHOLEST SERPL-MCNC: 174 MG/DL
CHOLEST/HDLC SERPL: 3.3 RATIO
CO2 SERPL-SCNC: 23 MMOL/L
CREAT SERPL-MCNC: 0.93 MG/DL
GLUCOSE SERPL-MCNC: 90 MG/DL
HDLC SERPL-MCNC: 53 MG/DL
INR PPP: 2.51 RATIO
LDLC SERPL CALC-MCNC: 97 MG/DL
POTASSIUM SERPL-SCNC: 4.8 MMOL/L
PROT SERPL-MCNC: 7 G/DL
PT BLD: 29.4 SEC
SODIUM SERPL-SCNC: 140 MMOL/L
TRIGL SERPL-MCNC: 120 MG/DL
TSH SERPL-ACNC: 0.83 UIU/ML

## 2020-05-04 ENCOUNTER — LABORATORY RESULT (OUTPATIENT)
Age: 85
End: 2020-05-04

## 2020-05-13 ENCOUNTER — RX RENEWAL (OUTPATIENT)
Age: 85
End: 2020-05-13

## 2020-05-18 ENCOUNTER — TRANSCRIPTION ENCOUNTER (OUTPATIENT)
Age: 85
End: 2020-05-18

## 2020-06-01 ENCOUNTER — LABORATORY RESULT (OUTPATIENT)
Age: 85
End: 2020-06-01

## 2020-06-30 ENCOUNTER — TRANSCRIPTION ENCOUNTER (OUTPATIENT)
Age: 85
End: 2020-06-30

## 2020-07-03 ENCOUNTER — LABORATORY RESULT (OUTPATIENT)
Age: 85
End: 2020-07-03

## 2020-07-06 ENCOUNTER — TRANSCRIPTION ENCOUNTER (OUTPATIENT)
Age: 85
End: 2020-07-06

## 2020-07-07 ENCOUNTER — RX RENEWAL (OUTPATIENT)
Age: 85
End: 2020-07-07

## 2020-07-15 ENCOUNTER — NON-APPOINTMENT (OUTPATIENT)
Age: 85
End: 2020-07-15

## 2020-07-15 ENCOUNTER — APPOINTMENT (OUTPATIENT)
Dept: CARDIOLOGY | Facility: CLINIC | Age: 85
End: 2020-07-15
Payer: MEDICARE

## 2020-07-15 VITALS
HEIGHT: 64 IN | OXYGEN SATURATION: 98 % | BODY MASS INDEX: 22.88 KG/M2 | TEMPERATURE: 97.3 F | DIASTOLIC BLOOD PRESSURE: 75 MMHG | SYSTOLIC BLOOD PRESSURE: 136 MMHG | RESPIRATION RATE: 17 BRPM | HEART RATE: 67 BPM | WEIGHT: 134 LBS

## 2020-07-15 PROCEDURE — 93000 ELECTROCARDIOGRAM COMPLETE: CPT

## 2020-07-15 PROCEDURE — 99214 OFFICE O/P EST MOD 30 MIN: CPT

## 2020-07-15 NOTE — PHYSICAL EXAM
[General Appearance - Well Developed] : well developed [General Appearance - Well Nourished] : well nourished [No Deformities] : no deformities [Well Groomed] : well groomed [Normal Appearance] : normal appearance [Normal Conjunctiva] : the conjunctiva exhibited no abnormalities [General Appearance - In No Acute Distress] : no acute distress [Eyelids - No Xanthelasma] : the eyelids demonstrated no xanthelasmas [Normal Oral Mucosa] : normal oral mucosa [No Oral Pallor] : no oral pallor [No Oral Cyanosis] : no oral cyanosis [Normal Jugular Venous A Waves Present] : normal jugular venous A waves present [No Jugular Venous Webb A Waves] : no jugular venous webb A waves [Normal Jugular Venous V Waves Present] : normal jugular venous V waves present [Respiration, Rhythm And Depth] : normal respiratory rhythm and effort [Auscultation Breath Sounds / Voice Sounds] : lungs were clear to auscultation bilaterally [Exaggerated Use Of Accessory Muscles For Inspiration] : no accessory muscle use [Murmurs] : no murmurs present [Heart Rate And Rhythm] : heart rate and rhythm were normal [Heart Sounds] : normal S1 and S2 [Abdomen Soft] : soft [Edema] : no peripheral edema present [Abdomen Tenderness] : non-tender [Abdomen Mass (___ Cm)] : no abdominal mass palpated [Gait - Sufficient For Exercise Testing] : the gait was sufficient for exercise testing [Abnormal Walk] : normal gait [Nail Clubbing] : no clubbing of the fingernails [Petechial Hemorrhages (___cm)] : no petechial hemorrhages [Cyanosis, Localized] : no localized cyanosis [No Venous Stasis] : no venous stasis [Skin Color & Pigmentation] : normal skin color and pigmentation [] : no rash [No Skin Ulcers] : no skin ulcer [No Xanthoma] : no  xanthoma was observed [Skin Lesions] : no skin lesions [Oriented To Time, Place, And Person] : oriented to person, place, and time [Affect] : the affect was normal [Mood] : the mood was normal [No Anxiety] : not feeling anxious [FreeTextEntry1] : Closing click of [facility] prosthesis

## 2020-07-15 NOTE — HISTORY OF PRESENT ILLNESS
[FreeTextEntry1] : 85-year-old female with a history of a [employer] aortic valve replacement, coronary artery disease, hypercholesterolemia.  She has no complaints.  She thinks her  may have had coronavirus, but he tested negative and is better.

## 2020-07-15 NOTE — DISCUSSION/SUMMARY
[FreeTextEntry1] : Mrs. Gillespie has no new complaints and looks unchanged.  Her exam shows regular rhythm, normal blood pressure, clear lungs, closing click from her aortic prosthesis.  And trace peripheral edema.  An EKG shows sinus rhythm and low voltage and is unchanged.  No change was made in her regimen.  She will follow-up in 4 months.

## 2020-08-18 ENCOUNTER — LABORATORY RESULT (OUTPATIENT)
Age: 85
End: 2020-08-18

## 2020-09-01 ENCOUNTER — APPOINTMENT (OUTPATIENT)
Dept: MAMMOGRAPHY | Facility: HOSPITAL | Age: 85
End: 2020-09-01
Payer: MEDICARE

## 2020-09-01 ENCOUNTER — OUTPATIENT (OUTPATIENT)
Dept: OUTPATIENT SERVICES | Facility: HOSPITAL | Age: 85
LOS: 1 days | End: 2020-09-01
Payer: MEDICARE

## 2020-09-01 ENCOUNTER — APPOINTMENT (OUTPATIENT)
Dept: ULTRASOUND IMAGING | Facility: HOSPITAL | Age: 85
End: 2020-09-01
Payer: MEDICARE

## 2020-09-01 DIAGNOSIS — R92.2 INCONCLUSIVE MAMMOGRAM: ICD-10-CM

## 2020-09-01 DIAGNOSIS — Z12.31 ENCOUNTER FOR SCREENING MAMMOGRAM FOR MALIGNANT NEOPLASM OF BREAST: ICD-10-CM

## 2020-09-01 PROCEDURE — 76641 ULTRASOUND BREAST COMPLETE: CPT | Mod: 26,50

## 2020-09-01 PROCEDURE — 77063 BREAST TOMOSYNTHESIS BI: CPT | Mod: 26

## 2020-09-01 PROCEDURE — 77067 SCR MAMMO BI INCL CAD: CPT

## 2020-09-01 PROCEDURE — 76641 ULTRASOUND BREAST COMPLETE: CPT

## 2020-09-01 PROCEDURE — 77067 SCR MAMMO BI INCL CAD: CPT | Mod: 26

## 2020-09-01 PROCEDURE — 77063 BREAST TOMOSYNTHESIS BI: CPT

## 2020-09-22 ENCOUNTER — LABORATORY RESULT (OUTPATIENT)
Age: 85
End: 2020-09-22

## 2020-10-23 ENCOUNTER — LABORATORY RESULT (OUTPATIENT)
Age: 85
End: 2020-10-23

## 2020-11-10 ENCOUNTER — TRANSCRIPTION ENCOUNTER (OUTPATIENT)
Age: 85
End: 2020-11-10

## 2020-11-11 ENCOUNTER — APPOINTMENT (OUTPATIENT)
Dept: CARDIOLOGY | Facility: CLINIC | Age: 85
End: 2020-11-11
Payer: MEDICARE

## 2020-11-11 ENCOUNTER — NON-APPOINTMENT (OUTPATIENT)
Age: 85
End: 2020-11-11

## 2020-11-11 VITALS
WEIGHT: 137 LBS | TEMPERATURE: 97.8 F | DIASTOLIC BLOOD PRESSURE: 60 MMHG | SYSTOLIC BLOOD PRESSURE: 130 MMHG | OXYGEN SATURATION: 100 % | HEART RATE: 65 BPM | BODY MASS INDEX: 23.52 KG/M2

## 2020-11-11 PROCEDURE — 93000 ELECTROCARDIOGRAM COMPLETE: CPT

## 2020-11-11 PROCEDURE — 99214 OFFICE O/P EST MOD 30 MIN: CPT

## 2020-11-11 NOTE — DISCUSSION/SUMMARY
[FreeTextEntry1] : Mrs. Gillespie has no new complaints and looks unchanged.  Her exam shows normal blood pressure, regular rhythm, clear lungs, unchanged closing click, and no peripheral edema.  Her EKG shows sinus rhythm with low voltage and is unchanged.  She is stable and doing well.  She brought blood with her from her internist which is unremarkable.  She will follow-up in 4 months.

## 2020-11-11 NOTE — HISTORY OF PRESENT ILLNESS
[FreeTextEntry1] : 85-year-old female with a history of a [employer] aortic valve replacement, CAD, hypertension, hypercholesterolemia, paroxysmal atrial fibrillation.  She gets occasional palpitations and is aware of her valve sometimes at night, but has no other complaints.  She remains active with no change in her exercise capacity.

## 2020-11-23 ENCOUNTER — LABORATORY RESULT (OUTPATIENT)
Age: 85
End: 2020-11-23

## 2020-11-25 ENCOUNTER — RX RENEWAL (OUTPATIENT)
Age: 85
End: 2020-11-25

## 2021-01-21 ENCOUNTER — LABORATORY RESULT (OUTPATIENT)
Age: 86
End: 2021-01-21

## 2021-02-08 ENCOUNTER — RX RENEWAL (OUTPATIENT)
Age: 86
End: 2021-02-08

## 2021-03-10 ENCOUNTER — APPOINTMENT (OUTPATIENT)
Dept: CARDIOLOGY | Facility: CLINIC | Age: 86
End: 2021-03-10
Payer: MEDICARE

## 2021-03-10 ENCOUNTER — NON-APPOINTMENT (OUTPATIENT)
Age: 86
End: 2021-03-10

## 2021-03-10 VITALS
TEMPERATURE: 97.8 F | DIASTOLIC BLOOD PRESSURE: 62 MMHG | BODY MASS INDEX: 23.17 KG/M2 | HEART RATE: 64 BPM | SYSTOLIC BLOOD PRESSURE: 112 MMHG | OXYGEN SATURATION: 99 % | WEIGHT: 135 LBS

## 2021-03-10 PROCEDURE — 93000 ELECTROCARDIOGRAM COMPLETE: CPT

## 2021-03-10 PROCEDURE — 99214 OFFICE O/P EST MOD 30 MIN: CPT

## 2021-03-10 NOTE — DISCUSSION/SUMMARY
[FreeTextEntry1] : Mrs. Gillespie has no new complaints and looks unchanged.  Her exam shows regular rhythm, normal blood pressure, clear lungs, and a normal cardiac exam.  Her EKG shows sinus rhythm and is within normal limits.  The area in her left arm is circumscribed and red, but is unlikely to be cellulitis since it appeared 2 weeks later and is not progressive.\par \par She is stable and no change was made in her regimen.  She will follow-up and have echocardiography in 4 months.

## 2021-03-10 NOTE — HISTORY OF PRESENT ILLNESS
[FreeTextEntry1] : 85-year-old female with a history of a [facility] aortic valve replacement, CAD, hypercholesterolemia, paroxysmal atrial fibrillation.  She is feeling generally well with sporadic palpitations and no change in her exercise capacity.  She has a rash in the area where she had her first coronavirus inoculation a few weeks ago.

## 2021-04-01 ENCOUNTER — LABORATORY RESULT (OUTPATIENT)
Age: 86
End: 2021-04-01

## 2021-04-30 ENCOUNTER — LABORATORY RESULT (OUTPATIENT)
Age: 86
End: 2021-04-30

## 2021-05-06 ENCOUNTER — APPOINTMENT (OUTPATIENT)
Dept: OBGYN | Facility: CLINIC | Age: 86
End: 2021-05-06
Payer: MEDICARE

## 2021-05-06 VITALS
WEIGHT: 140 LBS | SYSTOLIC BLOOD PRESSURE: 118 MMHG | BODY MASS INDEX: 23.9 KG/M2 | DIASTOLIC BLOOD PRESSURE: 80 MMHG | HEIGHT: 64 IN

## 2021-05-06 LAB
BILIRUB UR QL STRIP: NORMAL
CLARITY UR: CLEAR
COLLECTION METHOD: NORMAL
GLUCOSE UR-MCNC: NORMAL
HCG UR QL: 0.2 EU/DL
HGB UR QL STRIP.AUTO: NORMAL
KETONES UR-MCNC: NORMAL
LEUKOCYTE ESTERASE UR QL STRIP: NORMAL
NITRITE UR QL STRIP: NORMAL
PH UR STRIP: 7
PROT UR STRIP-MCNC: NORMAL
SP GR UR STRIP: 1.02

## 2021-05-06 PROCEDURE — G0101: CPT

## 2021-05-06 PROCEDURE — 82274 ASSAY TEST FOR BLOOD FECAL: CPT | Mod: QW

## 2021-05-06 PROCEDURE — 81003 URINALYSIS AUTO W/O SCOPE: CPT | Mod: QW

## 2021-06-01 ENCOUNTER — RX RENEWAL (OUTPATIENT)
Age: 86
End: 2021-06-01

## 2021-07-17 ENCOUNTER — LABORATORY RESULT (OUTPATIENT)
Age: 86
End: 2021-07-17

## 2021-08-17 DIAGNOSIS — Z78.0 ASYMPTOMATIC MENOPAUSAL STATE: ICD-10-CM

## 2021-08-19 ENCOUNTER — APPOINTMENT (OUTPATIENT)
Dept: CARDIOLOGY | Facility: CLINIC | Age: 86
End: 2021-08-19
Payer: MEDICARE

## 2021-08-19 ENCOUNTER — NON-APPOINTMENT (OUTPATIENT)
Age: 86
End: 2021-08-19

## 2021-08-19 ENCOUNTER — LABORATORY RESULT (OUTPATIENT)
Age: 86
End: 2021-08-19

## 2021-08-19 VITALS
HEART RATE: 69 BPM | BODY MASS INDEX: 23 KG/M2 | OXYGEN SATURATION: 97 % | WEIGHT: 134 LBS | SYSTOLIC BLOOD PRESSURE: 149 MMHG | DIASTOLIC BLOOD PRESSURE: 69 MMHG

## 2021-08-19 VITALS — DIASTOLIC BLOOD PRESSURE: 65 MMHG | SYSTOLIC BLOOD PRESSURE: 138 MMHG

## 2021-08-19 PROCEDURE — 93000 ELECTROCARDIOGRAM COMPLETE: CPT

## 2021-08-19 PROCEDURE — 99213 OFFICE O/P EST LOW 20 MIN: CPT

## 2021-08-19 PROCEDURE — 36415 COLL VENOUS BLD VENIPUNCTURE: CPT

## 2021-08-19 PROCEDURE — 93306 TTE W/DOPPLER COMPLETE: CPT

## 2021-08-19 NOTE — DISCUSSION/SUMMARY
[FreeTextEntry1] : Mrs. Gillespie has no new complaints and looks unchanged.  Her exam shows regular rhythm, borderline blood pressure, and unchanged cardiac exam with closing click, and trace peripheral edema.  Her EKG shows sinus rhythm with some borderline ST changes and is unchanged.\par \par An echo was done just before the visit.  Her prosthetic valve is functioning normally and the mitral stenosis from mitral annular calcification has not progressed since 2018.\par \par She is doing well.  No change was made in her regimen of aspirin, Coumadin, rosuvastatin 20, Zetia 10, and metoprolol 50.  Complete blood work was drawn.  She will follow-up in 4 months.

## 2021-08-19 NOTE — HISTORY OF PRESENT ILLNESS
[FreeTextEntry1] : 86-year old female with a history of aortic valve replacement, hypercholesterolemia, CAD with a closed right coronary artery, paroxysmal atrial fibrillation.  She has no new complaints.  She is not having palpitations.  She got her coronavirus booster shot yesterday.

## 2021-08-20 LAB
ALBUMIN SERPL ELPH-MCNC: 4.4 G/DL
ALP BLD-CCNC: 74 U/L
ALT SERPL-CCNC: 22 U/L
ANION GAP SERPL CALC-SCNC: 9 MMOL/L
AST SERPL-CCNC: 32 U/L
BASOPHILS # BLD AUTO: 0.04 K/UL
BASOPHILS NFR BLD AUTO: 0.8 %
BILIRUB SERPL-MCNC: 0.9 MG/DL
BUN SERPL-MCNC: 20 MG/DL
CALCIUM SERPL-MCNC: 9.9 MG/DL
CHLORIDE SERPL-SCNC: 103 MMOL/L
CHOLEST SERPL-MCNC: 171 MG/DL
CO2 SERPL-SCNC: 27 MMOL/L
CREAT SERPL-MCNC: 0.98 MG/DL
EOSINOPHIL # BLD AUTO: 0.25 K/UL
EOSINOPHIL NFR BLD AUTO: 4.8 %
ESTIMATED AVERAGE GLUCOSE: 123 MG/DL
GLUCOSE SERPL-MCNC: 81 MG/DL
HBA1C MFR BLD HPLC: 5.9 %
HCT VFR BLD CALC: 36.5 %
HDLC SERPL-MCNC: 57 MG/DL
HGB BLD-MCNC: 11.7 G/DL
IMM GRANULOCYTES NFR BLD AUTO: 0.4 %
LDLC SERPL CALC-MCNC: 96 MG/DL
LYMPHOCYTES # BLD AUTO: 1.64 K/UL
LYMPHOCYTES NFR BLD AUTO: 31.4 %
MAN DIFF?: NORMAL
MCHC RBC-ENTMCNC: 30.5 PG
MCHC RBC-ENTMCNC: 32.1 GM/DL
MCV RBC AUTO: 95.1 FL
MONOCYTES # BLD AUTO: 0.76 K/UL
MONOCYTES NFR BLD AUTO: 14.6 %
NEUTROPHILS # BLD AUTO: 2.51 K/UL
NEUTROPHILS NFR BLD AUTO: 48 %
NONHDLC SERPL-MCNC: 115 MG/DL
PLATELET # BLD AUTO: 159 K/UL
POTASSIUM SERPL-SCNC: 4.6 MMOL/L
PROT SERPL-MCNC: 6.9 G/DL
RBC # BLD: 3.84 M/UL
RBC # FLD: 13.9 %
SODIUM SERPL-SCNC: 139 MMOL/L
TRIGL SERPL-MCNC: 95 MG/DL
TSH SERPL-ACNC: 0.59 UIU/ML
WBC # FLD AUTO: 5.22 K/UL

## 2021-09-23 ENCOUNTER — LABORATORY RESULT (OUTPATIENT)
Age: 86
End: 2021-09-23

## 2021-10-04 ENCOUNTER — RESULT REVIEW (OUTPATIENT)
Age: 86
End: 2021-10-04

## 2021-10-04 ENCOUNTER — APPOINTMENT (OUTPATIENT)
Dept: ULTRASOUND IMAGING | Facility: HOSPITAL | Age: 86
End: 2021-10-04
Payer: MEDICARE

## 2021-10-04 ENCOUNTER — APPOINTMENT (OUTPATIENT)
Dept: RADIOLOGY | Facility: HOSPITAL | Age: 86
End: 2021-10-04
Payer: MEDICARE

## 2021-10-04 ENCOUNTER — APPOINTMENT (OUTPATIENT)
Dept: MAMMOGRAPHY | Facility: HOSPITAL | Age: 86
End: 2021-10-04
Payer: MEDICARE

## 2021-10-04 ENCOUNTER — OUTPATIENT (OUTPATIENT)
Dept: OUTPATIENT SERVICES | Facility: HOSPITAL | Age: 86
LOS: 1 days | End: 2021-10-04
Payer: MEDICARE

## 2021-10-04 DIAGNOSIS — Z78.0 ASYMPTOMATIC MENOPAUSAL STATE: ICD-10-CM

## 2021-10-04 PROCEDURE — 77080 DXA BONE DENSITY AXIAL: CPT | Mod: 26

## 2021-10-04 PROCEDURE — 76641 ULTRASOUND BREAST COMPLETE: CPT

## 2021-10-04 PROCEDURE — 77080 DXA BONE DENSITY AXIAL: CPT

## 2021-10-04 PROCEDURE — 77063 BREAST TOMOSYNTHESIS BI: CPT | Mod: 26

## 2021-10-04 PROCEDURE — 76641 ULTRASOUND BREAST COMPLETE: CPT | Mod: 26,50

## 2021-10-04 PROCEDURE — 77067 SCR MAMMO BI INCL CAD: CPT | Mod: 26

## 2021-10-04 PROCEDURE — 77063 BREAST TOMOSYNTHESIS BI: CPT

## 2021-10-04 PROCEDURE — 77067 SCR MAMMO BI INCL CAD: CPT

## 2021-10-20 ENCOUNTER — RX RENEWAL (OUTPATIENT)
Age: 86
End: 2021-10-20

## 2021-10-20 ENCOUNTER — LABORATORY RESULT (OUTPATIENT)
Age: 86
End: 2021-10-20

## 2021-10-29 ENCOUNTER — APPOINTMENT (OUTPATIENT)
Dept: ORTHOPEDIC SURGERY | Facility: CLINIC | Age: 86
End: 2021-10-29
Payer: MEDICARE

## 2021-10-29 DIAGNOSIS — M65.331 TRIGGER FINGER, RIGHT MIDDLE FINGER: ICD-10-CM

## 2021-10-29 DIAGNOSIS — M25.729 OSTEOPHYTE, UNSPECIFIED ELBOW: ICD-10-CM

## 2021-10-29 DIAGNOSIS — M65.20 CALCIFIC TENDINITIS, UNSPECIFIED SITE: ICD-10-CM

## 2021-10-29 PROCEDURE — 73070 X-RAY EXAM OF ELBOW: CPT | Mod: RT

## 2021-10-29 PROCEDURE — 73130 X-RAY EXAM OF HAND: CPT | Mod: 50

## 2021-10-29 PROCEDURE — 20600 DRAIN/INJ JOINT/BURSA W/O US: CPT | Mod: LT

## 2021-10-29 PROCEDURE — 99214 OFFICE O/P EST MOD 30 MIN: CPT | Mod: 25

## 2021-10-29 NOTE — ADDENDUM
[FreeTextEntry1] : I, Julio Hernandez, acted solely as a scribe for Dr. Arthur Cagle on this date 10/29/2021.\par All medical record entries made by the Scribe were at my, Dr. Arthur Cagle, direction and personally dictated by me on 10/29/2021. I have reviewed the chart and agree that the record accurately reflects my personal performance of the history, physical exam, assessment and plan. I have also personally directed, reviewed, and agreed with the chart.

## 2021-10-29 NOTE — PHYSICAL EXAM
[de-identified] : Constitutional\par o Appearance : well-nourished, well developed, alert, in no acute distress \par Head and Face\par o Head :\par ¦ Inspection : atraumatic, normocephalic\par o Face :\par ¦ Inspection : no visible rash or discoloration\par Respiratory\par o Respiratory Effort: breathing unlabored \par Neurologic\par o Sensation : Normal sensation \par Psychiatric\par o Mood and Affect: mood normal, affect appropriate \par Lymphatic\par o Additional Nodes : No palpable lymph nodes present \par \par Right Upper Extremity\par o Right Elbow :\par ¦ Inspection/Palpation : tenderness over the triceps and the olecranon, prominence of the olecranon, no swelling\par ¦ Range of Motion : full and painless in all planes, no crepitance\par ¦ Strength : flexion and extension 5/5\par ¦ Stability : no joint instability on provocative testing \par o Sensation : sensation intact to light touch\par o Tests: Tinel’s Sign negative, no pain with resisted extension and supination\par \par o Right Wrist:\par ¦ Inspection/Palpation : no tenderness, swelling or deformities\par ¦ Range of Motion : full and painless in all planes, no crepitance\par ¦ Strength : extension, flexion, ulnar deviation and radial deviation 5/5\par ¦ Stability : no joint instability on provocative testing\par ¦ Tests/Signs : Tinel's sign negative over carpal tunnel , negative Phalen’s, negative Finkelstein’s test \par \par o Right Hand :\par ¦ Inspection/Palpation : no tenderness to palpation or pain with axial compression, Heberden's nodes index and long finger\par ¦ Range of Motion : full arc of motion in the small joints of the hand, no discomfort elicited\par ¦ Strength : all intrinsic and extrinsic hand muscles 5/5\par ¦ Stability : no joint instability on provocative testing\par o Sensation : sensation intact to light touch\par o Skin : no skin lesions or discoloration \par \par Left Upper Extremity\par o Left Elbow :\par ¦ Inspection/Palpation : no tenderness, no swelling or deformities\par ¦ Range of Motion : full and painless in all planes, no crepitance\par ¦ Strength : flexion and extension 5/5\par ¦ Stability : no joint instability on provocative testing \par o Sensation : sensation intact to light touch\par o Tests: Tinel’s Sign negative, no pain with resisted extension and supination of the forearm\par \par o Left Wrist:\par ¦ Inspection/Palpation : basal joint tenderness, no swelling\par ¦ Range of Motion : full and painless in all planes, no crepitance\par ¦ Strength : extension, flexion, ulnar deviation and radial deviation 5/5\par ¦ Stability : no joint instability on provocative testing\par ¦ Tests/Signs : Tinel's sign negative over carpal tunnel, negative Phalen’s, negative Finkelstein’s test, positive grind test\par \par o Left Hand :\par ¦ Inspection/Palpation : no tenderness to palpation or pain with axial compression, Heberden's nodes of the index and long fingers\par ¦ Range of Motion : full arc of motion in the small joints of the hand, no discomfort elicited\par ¦ Strength : all intrinsic and extrinsic hand muscles 5/5\par ¦ Stability : no joint instability on provocative testing \par o Sensation : sensation intact to light touch\par o Skin : no skin lesions or discoloration \par \par Gait and Station: \par o Gait: gait normal, no significant extremity swelling or lymphedema, good proprioception and balance\par \par Radiology Results \par o Right Elbow : AP, lateral and oblique views were obtained and revealed a traction spur of the olecranon with calcification into the triceps insertion. 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

## 2021-10-29 NOTE — HISTORY OF PRESENT ILLNESS
[de-identified] : 86 year old RHD female presents complaining of bilateral hand and right elbow pain. Her right elbow has been bothering her for quite some time. She denies injury. She denies any swelling, pain radiating into the forearm, or numbness and tingling. Her left hand pain is worst about the thumb. 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 She had an injection for this in the past that helped. Her right long finger has been triggering and is quite painful. It does lock. She thinks that her left thumb is the worst problem today. She is fully COVID-19 vaccinated with the booster. Denies a hx of gout.

## 2021-10-29 NOTE — DISCUSSION/SUMMARY
[de-identified] : I discussed the underlying pathophysiology of the patient's condition in great detail with the patient. I went over the patient's x-rays with them in great detail. The extent of the patient’s arthritis was discussed in great detail with them. I advised her to ice her elbow and avoid leaning on it. The patient elected to receive a cortisone injection into the left basal joint today, and tolerated it well. I instructed the patient on ROM exercises, and told them to take it easy. The use of ice and rest was reviewed with the patient. The patient may resume activities tomorrow. She will follow-up in 2-3 weeks for a trigger finger injection into the right long finger. All of her questions were answered. She understands and consents to the plan.\par \par FU 2-3 weeks.\par after a left wrist basal joint cortisone injection today (10/29/2021).\par for a right long finger trigger finger injection.

## 2021-12-16 ENCOUNTER — NON-APPOINTMENT (OUTPATIENT)
Age: 86
End: 2021-12-16

## 2021-12-16 ENCOUNTER — APPOINTMENT (OUTPATIENT)
Dept: CARDIOLOGY | Facility: CLINIC | Age: 86
End: 2021-12-16
Payer: MEDICARE

## 2021-12-16 VITALS — SYSTOLIC BLOOD PRESSURE: 140 MMHG | DIASTOLIC BLOOD PRESSURE: 65 MMHG

## 2021-12-16 VITALS
HEART RATE: 60 BPM | BODY MASS INDEX: 23.9 KG/M2 | OXYGEN SATURATION: 100 % | WEIGHT: 140 LBS | SYSTOLIC BLOOD PRESSURE: 150 MMHG | RESPIRATION RATE: 17 BRPM | HEIGHT: 64 IN | DIASTOLIC BLOOD PRESSURE: 70 MMHG

## 2021-12-16 DIAGNOSIS — R00.2 PALPITATIONS: ICD-10-CM

## 2021-12-16 PROCEDURE — 93000 ELECTROCARDIOGRAM COMPLETE: CPT

## 2021-12-16 PROCEDURE — 85610 PROTHROMBIN TIME: CPT | Mod: QW

## 2021-12-16 PROCEDURE — 99214 OFFICE O/P EST MOD 30 MIN: CPT

## 2021-12-16 PROCEDURE — 36416 COLLJ CAPILLARY BLOOD SPEC: CPT

## 2021-12-16 NOTE — DISCUSSION/SUMMARY
[FreeTextEntry1] : Mrs Gillespie continues to experience palpitations and remains stressed.  Her exam shows regular rhythm, normal repeat blood pressure, closing clicks of her aortic prosthesis, clear lungs, and a normal cardiac exam.  Her EKG shows sinus bradycardia with low voltage and is unchanged.  An INR was 3.2.\par \par She is stable.  She will continue on Coumadin, aspirin, rosuvastatin, ezetimibe, metoprolol, and levothyroxine.  She will follow-up in 4 months.

## 2021-12-16 NOTE — HISTORY OF PRESENT ILLNESS
[FreeTextEntry1] : 86-year-old female with a history of [employer] Rehan's aortic valve replacement, CAD, hypercholesterolemia, paroxysmal atrial fibrillation, hypothyroidism.  She is stressed out with family matters.  She continues to experience sporadic palpitations which are about the same.  She has not been very active, but has no exertional complaints.\par \par She had blood work and an echocardiogram done at her last visit over the summer.  Her LDL cholesterol was 96 and her echo showed her aortic prosthesis was continuing to function normally.

## 2022-01-01 ENCOUNTER — OUTPATIENT (OUTPATIENT)
Dept: OUTPATIENT SERVICES | Facility: HOSPITAL | Age: 87
LOS: 1 days | End: 2022-01-01

## 2022-01-01 ENCOUNTER — APPOINTMENT (OUTPATIENT)
Dept: MAMMOGRAPHY | Facility: HOSPITAL | Age: 87
End: 2022-01-01

## 2022-01-01 ENCOUNTER — APPOINTMENT (OUTPATIENT)
Dept: FAMILY MEDICINE | Facility: CLINIC | Age: 87
End: 2022-01-01

## 2022-01-01 ENCOUNTER — LABORATORY RESULT (OUTPATIENT)
Age: 87
End: 2022-01-01

## 2022-01-01 ENCOUNTER — RESULT REVIEW (OUTPATIENT)
Age: 87
End: 2022-01-01

## 2022-01-01 ENCOUNTER — APPOINTMENT (OUTPATIENT)
Dept: ULTRASOUND IMAGING | Facility: HOSPITAL | Age: 87
End: 2022-01-01

## 2022-01-01 ENCOUNTER — RX RENEWAL (OUTPATIENT)
Age: 87
End: 2022-01-01

## 2022-01-01 ENCOUNTER — APPOINTMENT (OUTPATIENT)
Dept: CARDIOLOGY | Facility: CLINIC | Age: 87
End: 2022-01-01
Payer: MEDICARE

## 2022-01-01 ENCOUNTER — APPOINTMENT (OUTPATIENT)
Dept: DISASTER EMERGENCY | Facility: HOSPITAL | Age: 87
End: 2022-01-01

## 2022-01-01 ENCOUNTER — NON-APPOINTMENT (OUTPATIENT)
Age: 87
End: 2022-01-01

## 2022-01-01 ENCOUNTER — APPOINTMENT (OUTPATIENT)
Dept: OBGYN | Facility: CLINIC | Age: 87
End: 2022-01-01

## 2022-01-01 ENCOUNTER — EMERGENCY (EMERGENCY)
Facility: HOSPITAL | Age: 87
LOS: 1 days | Discharge: HOME | End: 2022-01-01
Attending: EMERGENCY MEDICINE | Admitting: EMERGENCY MEDICINE
Payer: MEDICARE

## 2022-01-01 ENCOUNTER — TRANSCRIPTION ENCOUNTER (OUTPATIENT)
Age: 87
End: 2022-01-01

## 2022-01-01 ENCOUNTER — OUTPATIENT (OUTPATIENT)
Dept: OUTPATIENT SERVICES | Facility: HOSPITAL | Age: 87
LOS: 1 days | End: 2022-01-01
Payer: MEDICARE

## 2022-01-01 VITALS
OXYGEN SATURATION: 97 % | RESPIRATION RATE: 18 BRPM | SYSTOLIC BLOOD PRESSURE: 134 MMHG | DIASTOLIC BLOOD PRESSURE: 78 MMHG | HEART RATE: 67 BPM | TEMPERATURE: 98 F

## 2022-01-01 VITALS
OXYGEN SATURATION: 100 % | WEIGHT: 128 LBS | BODY MASS INDEX: 23.41 KG/M2 | HEART RATE: 60 BPM | SYSTOLIC BLOOD PRESSURE: 157 MMHG | DIASTOLIC BLOOD PRESSURE: 74 MMHG

## 2022-01-01 VITALS
HEIGHT: 62 IN | DIASTOLIC BLOOD PRESSURE: 68 MMHG | HEART RATE: 64 BPM | RESPIRATION RATE: 20 BRPM | BODY MASS INDEX: 23.37 KG/M2 | WEIGHT: 127 LBS | SYSTOLIC BLOOD PRESSURE: 135 MMHG

## 2022-01-01 VITALS
WEIGHT: 125 LBS | SYSTOLIC BLOOD PRESSURE: 153 MMHG | HEART RATE: 80 BPM | TEMPERATURE: 98 F | DIASTOLIC BLOOD PRESSURE: 66 MMHG | RESPIRATION RATE: 18 BRPM | OXYGEN SATURATION: 99 % | HEIGHT: 64 IN

## 2022-01-01 VITALS
SYSTOLIC BLOOD PRESSURE: 137 MMHG | RESPIRATION RATE: 18 BRPM | WEIGHT: 128.97 LBS | OXYGEN SATURATION: 100 % | TEMPERATURE: 99 F | HEIGHT: 63 IN | HEART RATE: 64 BPM | DIASTOLIC BLOOD PRESSURE: 68 MMHG

## 2022-01-01 VITALS — SYSTOLIC BLOOD PRESSURE: 135 MMHG | DIASTOLIC BLOOD PRESSURE: 75 MMHG

## 2022-01-01 VITALS
SYSTOLIC BLOOD PRESSURE: 133 MMHG | HEIGHT: 62 IN | DIASTOLIC BLOOD PRESSURE: 81 MMHG | WEIGHT: 128 LBS | BODY MASS INDEX: 23.55 KG/M2

## 2022-01-01 DIAGNOSIS — U07.1 COVID-19: ICD-10-CM

## 2022-01-01 DIAGNOSIS — Z90.49 ACQUIRED ABSENCE OF OTHER SPECIFIED PARTS OF DIGESTIVE TRACT: Chronic | ICD-10-CM

## 2022-01-01 DIAGNOSIS — Z00.00 ENCOUNTER FOR GENERAL ADULT MEDICAL EXAMINATION W/OUT ABNORMAL FINDINGS: ICD-10-CM

## 2022-01-01 DIAGNOSIS — N81.4 UTEROVAGINAL PROLAPSE, UNSPECIFIED: ICD-10-CM

## 2022-01-01 DIAGNOSIS — Z00.8 ENCOUNTER FOR OTHER GENERAL EXAMINATION: ICD-10-CM

## 2022-01-01 DIAGNOSIS — N90.5 ATROPHY OF VULVA: ICD-10-CM

## 2022-01-01 DIAGNOSIS — Z01.411 ENCOUNTER FOR GYNECOLOGICAL EXAMINATION (GENERAL) (ROUTINE) WITH ABNORMAL FINDINGS: ICD-10-CM

## 2022-01-01 DIAGNOSIS — Z95.2 PRESENCE OF PROSTHETIC HEART VALVE: Chronic | ICD-10-CM

## 2022-01-01 DIAGNOSIS — Z23 ENCOUNTER FOR IMMUNIZATION: ICD-10-CM

## 2022-01-01 DIAGNOSIS — N81.11 CYSTOCELE, MIDLINE: ICD-10-CM

## 2022-01-01 LAB
ALBUMIN SERPL ELPH-MCNC: 4.8 G/DL
ALP BLD-CCNC: 76 U/L
ALT SERPL-CCNC: 30 U/L
ANION GAP SERPL CALC-SCNC: 12 MMOL/L
AST SERPL-CCNC: 43 U/L
BASOPHILS # BLD AUTO: 0.03 K/UL
BASOPHILS NFR BLD AUTO: 0.5 %
BILIRUB SERPL-MCNC: 1.3 MG/DL
BUN SERPL-MCNC: 21 MG/DL
CALCIUM SERPL-MCNC: 10.1 MG/DL
CHLORIDE SERPL-SCNC: 101 MMOL/L
CHOLEST SERPL-MCNC: 181 MG/DL
CO2 SERPL-SCNC: 26 MMOL/L
CREAT SERPL-MCNC: 1.05 MG/DL
EGFR: 52 ML/MIN/1.73M2
EOSINOPHIL # BLD AUTO: 0.11 K/UL
EOSINOPHIL NFR BLD AUTO: 1.9 %
ESTIMATED AVERAGE GLUCOSE: 126 MG/DL
FOLATE SERPL-MCNC: >20 NG/ML
GLUCOSE SERPL-MCNC: 92 MG/DL
HBA1C MFR BLD HPLC: 6 %
HCT VFR BLD CALC: 38.1 %
HDLC SERPL-MCNC: 50 MG/DL
HGB BLD-MCNC: 12.4 G/DL
IMM GRANULOCYTES NFR BLD AUTO: 0.2 %
LDLC SERPL CALC-MCNC: 111 MG/DL
LYMPHOCYTES # BLD AUTO: 1.93 K/UL
LYMPHOCYTES NFR BLD AUTO: 33.4 %
MAN DIFF?: NORMAL
MCHC RBC-ENTMCNC: 30.8 PG
MCHC RBC-ENTMCNC: 32.5 GM/DL
MCV RBC AUTO: 94.5 FL
MONOCYTES # BLD AUTO: 0.53 K/UL
MONOCYTES NFR BLD AUTO: 9.2 %
NEUTROPHILS # BLD AUTO: 3.17 K/UL
NEUTROPHILS NFR BLD AUTO: 54.8 %
NONHDLC SERPL-MCNC: 131 MG/DL
PLATELET # BLD AUTO: 183 K/UL
POTASSIUM SERPL-SCNC: 4.5 MMOL/L
PROT SERPL-MCNC: 7.5 G/DL
RBC # BLD: 4.03 M/UL
RBC # FLD: 13.8 %
SODIUM SERPL-SCNC: 140 MMOL/L
T4 FREE SERPL-MCNC: 1.6 NG/DL
TRIGL SERPL-MCNC: 98 MG/DL
TSH SERPL-ACNC: 0.11 UIU/ML
VIT B12 SERPL-MCNC: >2000 PG/ML
WBC # FLD AUTO: 5.78 K/UL

## 2022-01-01 PROCEDURE — 77065 DX MAMMO INCL CAD UNI: CPT | Mod: 26,GG,RT

## 2022-01-01 PROCEDURE — G0101: CPT | Mod: GA

## 2022-01-01 PROCEDURE — 76641 ULTRASOUND BREAST COMPLETE: CPT

## 2022-01-01 PROCEDURE — 77063 BREAST TOMOSYNTHESIS BI: CPT

## 2022-01-01 PROCEDURE — G0328 FECAL BLOOD SCRN IMMUNOASSAY: CPT | Mod: GA,QW

## 2022-01-01 PROCEDURE — 77067 SCR MAMMO BI INCL CAD: CPT

## 2022-01-01 PROCEDURE — 73110 X-RAY EXAM OF WRIST: CPT

## 2022-01-01 PROCEDURE — G0009: CPT

## 2022-01-01 PROCEDURE — 76641 ULTRASOUND BREAST COMPLETE: CPT | Mod: 26,50

## 2022-01-01 PROCEDURE — 93000 ELECTROCARDIOGRAM COMPLETE: CPT

## 2022-01-01 PROCEDURE — 73562 X-RAY EXAM OF KNEE 3: CPT | Mod: 26,RT

## 2022-01-01 PROCEDURE — 99284 EMERGENCY DEPT VISIT MOD MDM: CPT

## 2022-01-01 PROCEDURE — 77067 SCR MAMMO BI INCL CAD: CPT | Mod: 26,59

## 2022-01-01 PROCEDURE — 36415 COLL VENOUS BLD VENIPUNCTURE: CPT

## 2022-01-01 PROCEDURE — 77065 DX MAMMO INCL CAD UNI: CPT

## 2022-01-01 PROCEDURE — 73110 X-RAY EXAM OF WRIST: CPT | Mod: 26,50

## 2022-01-01 PROCEDURE — 90677 PCV20 VACCINE IM: CPT

## 2022-01-01 PROCEDURE — 99205 OFFICE O/P NEW HI 60 MIN: CPT | Mod: 25

## 2022-01-01 PROCEDURE — 77063 BREAST TOMOSYNTHESIS BI: CPT | Mod: 26,59

## 2022-01-01 PROCEDURE — 73562 X-RAY EXAM OF KNEE 3: CPT

## 2022-01-01 PROCEDURE — 99214 OFFICE O/P EST MOD 30 MIN: CPT

## 2022-01-01 RX ORDER — BEBTELOVIMAB 87.5 MG/ML
175 INJECTION, SOLUTION INTRAVENOUS ONCE
Refills: 0 | Status: COMPLETED | OUTPATIENT
Start: 2022-01-01 | End: 2022-01-01

## 2022-01-01 RX ORDER — WARFARIN 4 MG/1
4 TABLET ORAL
Qty: 36 | Refills: 3 | Status: ACTIVE | COMMUNITY
Start: 2019-12-12 | End: 1900-01-01

## 2022-01-01 RX ORDER — ACETAMINOPHEN 500 MG
650 TABLET ORAL ONCE
Refills: 0 | Status: COMPLETED | OUTPATIENT
Start: 2022-01-01 | End: 2022-01-01

## 2022-01-01 RX ADMIN — Medication 650 MILLIGRAM(S): at 20:34

## 2022-01-01 RX ADMIN — Medication 650 MILLIGRAM(S): at 20:04

## 2022-01-01 RX ADMIN — BEBTELOVIMAB 175 MILLIGRAM(S): 87.5 INJECTION, SOLUTION INTRAVENOUS at 14:54

## 2022-01-14 ENCOUNTER — APPOINTMENT (OUTPATIENT)
Dept: ORTHOPEDIC SURGERY | Facility: CLINIC | Age: 87
End: 2022-01-14

## 2022-01-22 ENCOUNTER — LABORATORY RESULT (OUTPATIENT)
Age: 87
End: 2022-01-22

## 2022-03-10 ENCOUNTER — RX RENEWAL (OUTPATIENT)
Age: 87
End: 2022-03-10

## 2022-03-27 ENCOUNTER — TRANSCRIPTION ENCOUNTER (OUTPATIENT)
Age: 87
End: 2022-03-27

## 2022-03-28 ENCOUNTER — LABORATORY RESULT (OUTPATIENT)
Age: 87
End: 2022-03-28

## 2022-04-04 ENCOUNTER — LABORATORY RESULT (OUTPATIENT)
Age: 87
End: 2022-04-04

## 2022-04-20 ENCOUNTER — APPOINTMENT (OUTPATIENT)
Dept: CARDIOLOGY | Facility: CLINIC | Age: 87
End: 2022-04-20
Payer: MEDICARE

## 2022-04-20 ENCOUNTER — NON-APPOINTMENT (OUTPATIENT)
Age: 87
End: 2022-04-20

## 2022-04-20 VITALS
DIASTOLIC BLOOD PRESSURE: 62 MMHG | WEIGHT: 143 LBS | SYSTOLIC BLOOD PRESSURE: 140 MMHG | HEART RATE: 65 BPM | BODY MASS INDEX: 24.55 KG/M2 | OXYGEN SATURATION: 97 %

## 2022-04-20 PROCEDURE — 36415 COLL VENOUS BLD VENIPUNCTURE: CPT

## 2022-04-20 PROCEDURE — 93000 ELECTROCARDIOGRAM COMPLETE: CPT

## 2022-04-20 PROCEDURE — 99214 OFFICE O/P EST MOD 30 MIN: CPT

## 2022-04-20 NOTE — ASSESSMENT
[FreeTextEntry1] : Ms Gillespie has no new complaints and looks unchanged.  Her exam shows a weight gain of 3 pounds, regular rhythm, normal blood pressure, clear lungs, closing click of her [employer]. Rehan's prosthesis with systolic murmur, and 1+ peripheral edema.  Her EKG shows sinus rhythm with some VPCs and low voltage.  It is unchanged.\par \par Complete blood work was drawn.

## 2022-04-20 NOTE — HISTORY OF PRESENT ILLNESS
[FreeTextEntry1] : 86-year-old female with a history of [facility] Rehan's aortic valve replacement, CAD, paroxysmal atrial fibrillation, hypertension, hypercholesterolemia, anxiety.  She was last seen in December and has been feeling generally well over the winter.  She has no exertional complaints and has not had any recent bouts of palpitations.  She will be traveling on an airplane in the spring.\par \par Her last LDL was 96 on rosuvastatin and Zetia.

## 2022-04-21 LAB
ALBUMIN SERPL ELPH-MCNC: 4.4 G/DL
ALP BLD-CCNC: 76 U/L
ALT SERPL-CCNC: 18 U/L
ANION GAP SERPL CALC-SCNC: 13 MMOL/L
AST SERPL-CCNC: 26 U/L
BASOPHILS # BLD AUTO: 0.03 K/UL
BASOPHILS NFR BLD AUTO: 0.4 %
BILIRUB SERPL-MCNC: 1.4 MG/DL
BUN SERPL-MCNC: 17 MG/DL
CALCIUM SERPL-MCNC: 9.7 MG/DL
CHLORIDE SERPL-SCNC: 102 MMOL/L
CHOLEST SERPL-MCNC: 184 MG/DL
CO2 SERPL-SCNC: 26 MMOL/L
CREAT SERPL-MCNC: 0.98 MG/DL
EGFR: 57 ML/MIN/1.73M2
EOSINOPHIL # BLD AUTO: 0.13 K/UL
EOSINOPHIL NFR BLD AUTO: 1.8 %
ESTIMATED AVERAGE GLUCOSE: 128 MG/DL
GLUCOSE SERPL-MCNC: 80 MG/DL
HBA1C MFR BLD HPLC: 6.1 %
HCT VFR BLD CALC: 35.5 %
HDLC SERPL-MCNC: 59 MG/DL
HGB BLD-MCNC: 11.3 G/DL
IMM GRANULOCYTES NFR BLD AUTO: 0.3 %
INR PPP: 4.15 RATIO
LDLC SERPL CALC-MCNC: 93 MG/DL
LYMPHOCYTES # BLD AUTO: 2.11 K/UL
LYMPHOCYTES NFR BLD AUTO: 29.9 %
MAN DIFF?: NORMAL
MCHC RBC-ENTMCNC: 30.5 PG
MCHC RBC-ENTMCNC: 31.8 GM/DL
MCV RBC AUTO: 95.9 FL
MONOCYTES # BLD AUTO: 0.82 K/UL
MONOCYTES NFR BLD AUTO: 11.6 %
NEUTROPHILS # BLD AUTO: 3.94 K/UL
NEUTROPHILS NFR BLD AUTO: 56 %
NONHDLC SERPL-MCNC: 125 MG/DL
PLATELET # BLD AUTO: 208 K/UL
POTASSIUM SERPL-SCNC: 4.1 MMOL/L
PROT SERPL-MCNC: 7 G/DL
PT BLD: 49.2 SEC
RBC # BLD: 3.7 M/UL
RBC # FLD: 14.1 %
SODIUM SERPL-SCNC: 141 MMOL/L
TRIGL SERPL-MCNC: 161 MG/DL
TSH SERPL-ACNC: 0.41 UIU/ML
WBC # FLD AUTO: 7.05 K/UL

## 2022-05-11 ENCOUNTER — LABORATORY RESULT (OUTPATIENT)
Age: 87
End: 2022-05-11

## 2022-05-14 ENCOUNTER — NON-APPOINTMENT (OUTPATIENT)
Age: 87
End: 2022-05-14

## 2022-05-24 ENCOUNTER — RX RENEWAL (OUTPATIENT)
Age: 87
End: 2022-05-24

## 2022-06-03 ENCOUNTER — NON-APPOINTMENT (OUTPATIENT)
Age: 87
End: 2022-06-03

## 2022-06-03 ENCOUNTER — LABORATORY RESULT (OUTPATIENT)
Age: 87
End: 2022-06-03

## 2022-06-07 LAB
INR PPP: 3.2 RATIO
POCT-PROTHROMBIN TIME: 38.3 SECS

## 2022-06-14 ENCOUNTER — APPOINTMENT (OUTPATIENT)
Dept: ORTHOPEDIC SURGERY | Facility: CLINIC | Age: 87
End: 2022-06-14
Payer: MEDICARE

## 2022-06-14 VITALS — BODY MASS INDEX: 23.9 KG/M2 | WEIGHT: 140 LBS | HEIGHT: 64 IN

## 2022-06-14 DIAGNOSIS — M19.029 PRIMARY OSTEOARTHRITIS, UNSPECIFIED ELBOW: ICD-10-CM

## 2022-06-14 PROCEDURE — 99213 OFFICE O/P EST LOW 20 MIN: CPT

## 2022-06-14 PROCEDURE — 73080 X-RAY EXAM OF ELBOW: CPT | Mod: LT

## 2022-06-14 NOTE — END OF VISIT
[FreeTextEntry3] : All medical record entries made by the Scribe were at my,  Dr. Adi Sung MD., direction and personally dictated by me on 06/14/2022. I have personally reviewed the chart and agree that the record accurately reflects my personal performance of the history, physical exam, assessment and plan.

## 2022-06-14 NOTE — ADDENDUM
[FreeTextEntry1] : I, Meryl Ramirez wrote this note acting as a scribe for Dr. Adi Sung on Jun 14, 2022.

## 2022-06-14 NOTE — PHYSICAL EXAM
[de-identified] : Patient is WDWN, alert, and in no acute distress. Breathing is unlabored. She is grossly oriented to person, place, and time.\par \par Left Elbow:\par There is a well circumscribed mass along the posterior aspect of the left elbow.\par It is freely mobile and firm\par She has full ROM into flexion and extension of the elbow without pain\par The mass is nontender to palpation [de-identified] : AP, lateral and oblique views of the LEFT elbow were obtained today and revealed a calcific deposit. No fractures or dislocations.

## 2022-06-14 NOTE — HISTORY OF PRESENT ILLNESS
[de-identified] : Patient is a RHD 87 year old female who presents with complaints of a left elbow mass which she has had for a few months.She denies injury It is not terribly painful but she is concerned as she is unsure of what it is and would like to have it evaluated today.

## 2022-06-14 NOTE — DISCUSSION/SUMMARY
[de-identified] : The underlying pathophysiology was reviewed with the patient. XR films were reviewed with the patient. Discussed at length the nature of the patient’s condition. The left elbow symptoms appear secondary to calcific mass.\par \par At this time, as the left elbow mass is not symptomatic, I have recommended observation. I did tell her given other xrays she has had in the past, she is clearly prone to calcification but I reassured her it is not harmful. However, I would recommend she return in about 6 to 12 months for repeat xrays out of precaution. In the interim however, if the mass increases in size she was instructed to return to the office sooner to discuss possible surgical excision. \par \par All questions answered, understanding verbalized. Patient in agreement with plan of care. Follow up in 6 months for repeat xrays.

## 2022-07-01 ENCOUNTER — LABORATORY RESULT (OUTPATIENT)
Age: 87
End: 2022-07-01

## 2022-07-08 ENCOUNTER — NON-APPOINTMENT (OUTPATIENT)
Age: 87
End: 2022-07-08

## 2022-07-21 ENCOUNTER — APPOINTMENT (OUTPATIENT)
Dept: ORTHOPEDIC SURGERY | Facility: CLINIC | Age: 87
End: 2022-07-21

## 2022-07-21 VITALS — BODY MASS INDEX: 23.9 KG/M2 | HEIGHT: 64 IN | WEIGHT: 140 LBS

## 2022-07-21 DIAGNOSIS — M25.572 PAIN IN LEFT ANKLE AND JOINTS OF LEFT FOOT: ICD-10-CM

## 2022-07-21 PROCEDURE — 73630 X-RAY EXAM OF FOOT: CPT | Mod: LT

## 2022-07-21 PROCEDURE — 99214 OFFICE O/P EST MOD 30 MIN: CPT

## 2022-07-21 PROCEDURE — 73610 X-RAY EXAM OF ANKLE: CPT | Mod: LT

## 2022-07-21 PROCEDURE — 73590 X-RAY EXAM OF LOWER LEG: CPT | Mod: LT

## 2022-07-21 NOTE — HISTORY OF PRESENT ILLNESS
[de-identified] : YVETTE VIVAS is a 87 year old female who presents for initial evaluation of left ankle pain. On 07/04/2022, patient reports falling going up her front house steps. She reports there was left ankle pain with associated swelling. She continues to have pain and swelling in the ankle. She saw her podiatrist and had XR imaging done. She presents in a short leg splint and stiff shoe.

## 2022-07-21 NOTE — PHYSICAL EXAM
[Slightly Antalgic] : slightly antalgic [Normal RLE] : Right Lower Extremity: No scars, rashes, lesions, ulcers, skin intact [Normal LLE] : Left Lower Extremity: No scars, rashes, lesions, ulcers, skin intact [Normal Touch] : sensation intact for touch [Normal] : Oriented to person, place, and time, insight and judgement were intact and the affect was normal [de-identified] : Left Lower Extremity\par o Foot:\par ¦ Inspection/Palpation : diffuse midfoot tenderness to palpation, moderate diffuse swelling\par ¦ Range of Motion : arc of motion full and painless in all planes\par ¦ Stability : no joint instability on provocative testing\par ¦ Strength : all muscles 5/5\par o Muscle Bulk : no atrophy\par o Sensation : sensation intact to light touch\par o Skin : no skin lesions, no discoloration\par o Vascular Exam : no edema, no cyanosis, radial and ulnar pulses normal \par \par o Ankle :\par ¦ Inspection/Palpation : diffuse medial and lateral tenderness to palpation, moderate swelling, no deformities\par ¦ Range of Motion : arc of motion limited with pain in all planes\par ¦ Stability : not assessed due to injury\par ¦ Strength : all muscles 5/5\par o Muscle Bulk : no atrophy\par o Sensation : sensation intact to light touch\par o Skin : no skin lesions, no discoloration\par o Vascular Exam : no edema, no cyanosis,  dorsalis pedis artery pulse 2+, posterior tibial artery pulse 2+ \par \par o Tib/Fib:\par ¦ Inspection/Palpation : diffuse ecchymosis at the anteromedial aspect, mild tenderness to palpation anteromedial tibia, mild swelling, no deformities [de-identified] : o Left Tib/Fib : AP and lateral views of the tibia/fibula were obtained, there are no soft tissue abnormalities, no fractures, alignment is normal, normal appearing joint spaces, normal bone density, no bony lesions. \par \par o Left ankle : AP, lateral and oblique views were obtained, there are no soft tissue abnormalities, no fractures, alignment is normal, normal appearing joint spaces, normal bone density, no bony lesions, calcification within the distal Achilles tendon\par \par o Left Foot: AP, lateral and oblique views of the foot were obtained, there are no soft tissue abnormalities, no fractures, alignment is normal, normal appearing joint spaces, normal bone density, no bony lesions, calcifications 1st and 4th MTP joints, calcification within the distal Achilles tendon\par \par \par Outside XR of the left ankle from 07/11/2022 reviewed today in office revealed no fractures, calcification within the distal Achilles tendon.

## 2022-07-21 NOTE — DISCUSSION/SUMMARY
[de-identified] : The underlying pathophysiology was reviewed in great detail with the patient as well as the various treatment options, including ice, analgesics, NSAIDs, Physical therapy, immobilization bracing\par \par Activity modifications and restrictions were discussed.\par \par Patient was placed into an Aircast brace. \par \par A home exercise sheet was given and discussed with the patient to follow. 	\par \par FU in 4 weeks.	 \par \par All questions were answered, all alternatives discussed and the patient is in complete agreement with that plan. Follow-up appointment as instructed. Any issues and the patient will call or come in sooner.

## 2022-07-21 NOTE — ADDENDUM
[FreeTextEntry1] : I, Jayme Sandoval, acted solely as a scribe for Dr. Michael Villalobos on this date 07/21/2022.

## 2022-07-25 NOTE — HISTORY OF PRESENT ILLNESS
[FreeTextEntry1] : 83-year-old female with a history of [facility] Rehan's aortic valve replacement, CAD, Hypercholesterolemia, and paroxysmal atrial fibrillation.  She remains without any cardiac symptoms and is feeling generally well. Yealvin Ietoum

## 2022-07-29 ENCOUNTER — LABORATORY RESULT (OUTPATIENT)
Age: 87
End: 2022-07-29

## 2022-08-01 ENCOUNTER — NON-APPOINTMENT (OUTPATIENT)
Age: 87
End: 2022-08-01

## 2022-08-02 ENCOUNTER — INPATIENT (INPATIENT)
Facility: HOSPITAL | Age: 87
LOS: 1 days | Discharge: HOME | DRG: 446 | End: 2022-08-04
Attending: INTERNAL MEDICINE | Admitting: INTERNAL MEDICINE
Payer: MEDICARE

## 2022-08-02 VITALS
HEART RATE: 56 BPM | TEMPERATURE: 99 F | OXYGEN SATURATION: 96 % | WEIGHT: 132.28 LBS | HEIGHT: 64 IN | RESPIRATION RATE: 18 BRPM | DIASTOLIC BLOOD PRESSURE: 66 MMHG | SYSTOLIC BLOOD PRESSURE: 121 MMHG

## 2022-08-02 DIAGNOSIS — K81.0 ACUTE CHOLECYSTITIS: ICD-10-CM

## 2022-08-02 LAB
ALBUMIN SERPL ELPH-MCNC: 3.9 G/DL — SIGNIFICANT CHANGE UP (ref 3.3–5)
ALP SERPL-CCNC: 69 U/L — SIGNIFICANT CHANGE UP (ref 40–120)
ALT FLD-CCNC: 25 U/L — SIGNIFICANT CHANGE UP (ref 10–45)
ANION GAP SERPL CALC-SCNC: 10 MMOL/L — SIGNIFICANT CHANGE UP (ref 5–17)
APPEARANCE UR: CLEAR — SIGNIFICANT CHANGE UP
APTT BLD: 41.5 SEC — HIGH (ref 27.5–35.5)
AST SERPL-CCNC: 45 U/L — HIGH (ref 10–40)
BACTERIA # UR AUTO: ABNORMAL /HPF
BASOPHILS # BLD AUTO: 0.04 K/UL — SIGNIFICANT CHANGE UP (ref 0–0.2)
BASOPHILS NFR BLD AUTO: 0.4 % — SIGNIFICANT CHANGE UP (ref 0–2)
BILIRUB SERPL-MCNC: 2 MG/DL — HIGH (ref 0.2–1.2)
BILIRUB UR-MCNC: NEGATIVE — SIGNIFICANT CHANGE UP
BUN SERPL-MCNC: 19 MG/DL — SIGNIFICANT CHANGE UP (ref 7–23)
CALCIUM SERPL-MCNC: 9.4 MG/DL — SIGNIFICANT CHANGE UP (ref 8.4–10.5)
CHLORIDE SERPL-SCNC: 103 MMOL/L — SIGNIFICANT CHANGE UP (ref 96–108)
CO2 SERPL-SCNC: 28 MMOL/L — SIGNIFICANT CHANGE UP (ref 22–31)
COLOR SPEC: YELLOW — SIGNIFICANT CHANGE UP
CREAT SERPL-MCNC: 1.1 MG/DL — SIGNIFICANT CHANGE UP (ref 0.5–1.3)
DIFF PNL FLD: ABNORMAL
EGFR: 49 ML/MIN/1.73M2 — LOW
EOSINOPHIL # BLD AUTO: 0.08 K/UL — SIGNIFICANT CHANGE UP (ref 0–0.5)
EOSINOPHIL NFR BLD AUTO: 0.7 % — SIGNIFICANT CHANGE UP (ref 0–6)
EPI CELLS # UR: SIGNIFICANT CHANGE UP
GLUCOSE SERPL-MCNC: 125 MG/DL — HIGH (ref 70–99)
GLUCOSE UR QL: NEGATIVE — SIGNIFICANT CHANGE UP
HCT VFR BLD CALC: 35.4 % — SIGNIFICANT CHANGE UP (ref 34.5–45)
HGB BLD-MCNC: 11.6 G/DL — SIGNIFICANT CHANGE UP (ref 11.5–15.5)
IMM GRANULOCYTES NFR BLD AUTO: 0.4 % — SIGNIFICANT CHANGE UP (ref 0–1.5)
INR BLD: 4.24 RATIO — HIGH (ref 0.88–1.16)
KETONES UR-MCNC: NEGATIVE — SIGNIFICANT CHANGE UP
LEUKOCYTE ESTERASE UR-ACNC: NEGATIVE — SIGNIFICANT CHANGE UP
LIDOCAIN IGE QN: 167 U/L — SIGNIFICANT CHANGE UP (ref 73–393)
LYMPHOCYTES # BLD AUTO: 1.37 K/UL — SIGNIFICANT CHANGE UP (ref 1–3.3)
LYMPHOCYTES # BLD AUTO: 12.7 % — LOW (ref 13–44)
MCHC RBC-ENTMCNC: 30.3 PG — SIGNIFICANT CHANGE UP (ref 27–34)
MCHC RBC-ENTMCNC: 32.8 GM/DL — SIGNIFICANT CHANGE UP (ref 32–36)
MCV RBC AUTO: 92.4 FL — SIGNIFICANT CHANGE UP (ref 80–100)
MONOCYTES # BLD AUTO: 0.65 K/UL — SIGNIFICANT CHANGE UP (ref 0–0.9)
MONOCYTES NFR BLD AUTO: 6 % — SIGNIFICANT CHANGE UP (ref 2–14)
NEUTROPHILS # BLD AUTO: 8.63 K/UL — HIGH (ref 1.8–7.4)
NEUTROPHILS NFR BLD AUTO: 79.8 % — HIGH (ref 43–77)
NITRITE UR-MCNC: NEGATIVE — SIGNIFICANT CHANGE UP
NRBC # BLD: 0 /100 WBCS — SIGNIFICANT CHANGE UP (ref 0–0)
PH UR: 6 — SIGNIFICANT CHANGE UP (ref 5–8)
PLATELET # BLD AUTO: 196 K/UL — SIGNIFICANT CHANGE UP (ref 150–400)
POTASSIUM SERPL-MCNC: 4.4 MMOL/L — SIGNIFICANT CHANGE UP (ref 3.5–5.3)
POTASSIUM SERPL-SCNC: 4.4 MMOL/L — SIGNIFICANT CHANGE UP (ref 3.5–5.3)
PROT SERPL-MCNC: 7.8 G/DL — SIGNIFICANT CHANGE UP (ref 6–8.3)
PROT UR-MCNC: 15
PROTHROM AB SERPL-ACNC: 49.9 SEC — HIGH (ref 10.5–13.4)
RBC # BLD: 3.83 M/UL — SIGNIFICANT CHANGE UP (ref 3.8–5.2)
RBC # FLD: 14.1 % — SIGNIFICANT CHANGE UP (ref 10.3–14.5)
RBC CASTS # UR COMP ASSIST: ABNORMAL /HPF (ref 0–4)
SARS-COV-2 RNA SPEC QL NAA+PROBE: SIGNIFICANT CHANGE UP
SODIUM SERPL-SCNC: 141 MMOL/L — SIGNIFICANT CHANGE UP (ref 135–145)
SP GR SPEC: 1.01 — SIGNIFICANT CHANGE UP (ref 1.01–1.02)
UROBILINOGEN FLD QL: NEGATIVE — SIGNIFICANT CHANGE UP
WBC # BLD: 10.81 K/UL — HIGH (ref 3.8–10.5)
WBC # FLD AUTO: 10.81 K/UL — HIGH (ref 3.8–10.5)
WBC UR QL: NEGATIVE /HPF — SIGNIFICANT CHANGE UP (ref 0–5)

## 2022-08-02 PROCEDURE — 99285 EMERGENCY DEPT VISIT HI MDM: CPT | Mod: FS

## 2022-08-02 PROCEDURE — 76705 ECHO EXAM OF ABDOMEN: CPT | Mod: 26

## 2022-08-02 PROCEDURE — 71275 CT ANGIOGRAPHY CHEST: CPT | Mod: 26,MA

## 2022-08-02 PROCEDURE — 74174 CTA ABD&PLVS W/CONTRAST: CPT | Mod: 26,MA

## 2022-08-02 PROCEDURE — 71045 X-RAY EXAM CHEST 1 VIEW: CPT | Mod: 26

## 2022-08-02 PROCEDURE — 99223 1ST HOSP IP/OBS HIGH 75: CPT

## 2022-08-02 PROCEDURE — 93010 ELECTROCARDIOGRAM REPORT: CPT

## 2022-08-02 RX ORDER — PIPERACILLIN AND TAZOBACTAM 4; .5 G/20ML; G/20ML
3.38 INJECTION, POWDER, LYOPHILIZED, FOR SOLUTION INTRAVENOUS ONCE
Refills: 0 | Status: COMPLETED | OUTPATIENT
Start: 2022-08-02 | End: 2022-08-02

## 2022-08-02 RX ORDER — ATORVASTATIN CALCIUM 80 MG/1
80 TABLET, FILM COATED ORAL AT BEDTIME
Refills: 0 | Status: DISCONTINUED | OUTPATIENT
Start: 2022-08-02 | End: 2022-08-03

## 2022-08-02 RX ORDER — ONDANSETRON 8 MG/1
4 TABLET, FILM COATED ORAL ONCE
Refills: 0 | Status: COMPLETED | OUTPATIENT
Start: 2022-08-02 | End: 2022-08-02

## 2022-08-02 RX ORDER — METOPROLOL TARTRATE 50 MG
50 TABLET ORAL DAILY
Refills: 0 | Status: DISCONTINUED | OUTPATIENT
Start: 2022-08-02 | End: 2022-08-04

## 2022-08-02 RX ORDER — PANTOPRAZOLE SODIUM 20 MG/1
40 TABLET, DELAYED RELEASE ORAL
Refills: 0 | Status: DISCONTINUED | OUTPATIENT
Start: 2022-08-02 | End: 2022-08-04

## 2022-08-02 RX ORDER — ACETAMINOPHEN 500 MG
650 TABLET ORAL EVERY 6 HOURS
Refills: 0 | Status: DISCONTINUED | OUTPATIENT
Start: 2022-08-02 | End: 2022-08-04

## 2022-08-02 RX ORDER — SODIUM CHLORIDE 9 MG/ML
1000 INJECTION INTRAMUSCULAR; INTRAVENOUS; SUBCUTANEOUS ONCE
Refills: 0 | Status: COMPLETED | OUTPATIENT
Start: 2022-08-02 | End: 2022-08-02

## 2022-08-02 RX ORDER — MORPHINE SULFATE 50 MG/1
2 CAPSULE, EXTENDED RELEASE ORAL ONCE
Refills: 0 | Status: DISCONTINUED | OUTPATIENT
Start: 2022-08-02 | End: 2022-08-02

## 2022-08-02 RX ORDER — SODIUM CHLORIDE 9 MG/ML
500 INJECTION INTRAMUSCULAR; INTRAVENOUS; SUBCUTANEOUS
Refills: 0 | Status: DISCONTINUED | OUTPATIENT
Start: 2022-08-02 | End: 2022-08-04

## 2022-08-02 RX ORDER — LEVOTHYROXINE SODIUM 125 MCG
88 TABLET ORAL DAILY
Refills: 0 | Status: DISCONTINUED | OUTPATIENT
Start: 2022-08-02 | End: 2022-08-04

## 2022-08-02 RX ORDER — PIPERACILLIN AND TAZOBACTAM 4; .5 G/20ML; G/20ML
3.38 INJECTION, POWDER, LYOPHILIZED, FOR SOLUTION INTRAVENOUS EVERY 8 HOURS
Refills: 0 | Status: DISCONTINUED | OUTPATIENT
Start: 2022-08-02 | End: 2022-08-04

## 2022-08-02 RX ORDER — FAMOTIDINE 10 MG/ML
20 INJECTION INTRAVENOUS ONCE
Refills: 0 | Status: COMPLETED | OUTPATIENT
Start: 2022-08-02 | End: 2022-08-02

## 2022-08-02 RX ADMIN — FAMOTIDINE 200 MILLIGRAM(S): 10 INJECTION INTRAVENOUS at 18:47

## 2022-08-02 RX ADMIN — MORPHINE SULFATE 2 MILLIGRAM(S): 50 CAPSULE, EXTENDED RELEASE ORAL at 18:48

## 2022-08-02 RX ADMIN — SODIUM CHLORIDE 1000 MILLILITER(S): 9 INJECTION INTRAMUSCULAR; INTRAVENOUS; SUBCUTANEOUS at 18:47

## 2022-08-02 RX ADMIN — SODIUM CHLORIDE 1000 MILLILITER(S): 9 INJECTION INTRAMUSCULAR; INTRAVENOUS; SUBCUTANEOUS at 19:47

## 2022-08-02 RX ADMIN — PIPERACILLIN AND TAZOBACTAM 200 GRAM(S): 4; .5 INJECTION, POWDER, LYOPHILIZED, FOR SOLUTION INTRAVENOUS at 23:10

## 2022-08-02 RX ADMIN — MORPHINE SULFATE 2 MILLIGRAM(S): 50 CAPSULE, EXTENDED RELEASE ORAL at 19:05

## 2022-08-02 RX ADMIN — ONDANSETRON 4 MILLIGRAM(S): 8 TABLET, FILM COATED ORAL at 18:47

## 2022-08-02 RX ADMIN — FAMOTIDINE 20 MILLIGRAM(S): 10 INJECTION INTRAVENOUS at 19:07

## 2022-08-02 NOTE — ED PROVIDER NOTE - OBJECTIVE STATEMENT
86 y/o F with h/o AVR, Afib (Coumadin), Hypothyroidism, HLD pw sudden onset bandlike epigastric pain radiating to her back starting at 3pm today while at rest accompanied with nausea. Denies fever, chills, headache,  shortness of breath, vomiting, diarrhea, dysuria, hematuria, weakness, numbness, tingling.   PMD- Jazmyne  Cardio- Cheri  Surg hx: Appendectomy

## 2022-08-02 NOTE — ED PROVIDER NOTE - NS ED ATTENDING STATEMENT MOD
This was a shared visit with the VIOLETTA. I reviewed and verified the documentation and independently performed the documented:

## 2022-08-02 NOTE — ED PROVIDER NOTE - ATTENDING APP SHARED VISIT CONTRIBUTION OF CARE
I, Bartolo Novak, DO personally saw the patient with VIOLETTA.  I have personally performed a face to face diagnostic evaluation on this patient.  I have reviewed the VIOLETTA note and agree with the history, exam, and plan of care, except as noted.  I personally saw the patient and performed a substantive portion of the visit including all aspects of the medical decision making.

## 2022-08-02 NOTE — H&P ADULT - HISTORY OF PRESENT ILLNESS
87F hx of [facility] AVR, PAF on coumadin, HLD, hypothyroid pw acute onset on moderate epigastric pain bandlike that wrapped around to her back associated with nausea started this afternoon. Denied any fevers or chills, SOB, CP, palps, diaphoresis. Sxs were persistent and came to ED. In ED afebrile P: 56 BP:121/66 sat 96% on RA. WBC: 10.81 80% N INR: 4.24 cr: 1.1 TB: 2 AST/ALT 45/25 UA mod blood. CT chest and abd negative for dissection but with cholelithiasis in distended GB. Abd sono with distend GB with stones, sludge, +Gabriel CBD 9mm upper limits of normal. Currently pt feels improved with min sxs.

## 2022-08-02 NOTE — ED ADULT NURSE NOTE - OBJECTIVE STATEMENT
Patient came from home complaining about abdominal pain that located to RUQ and radiates to the back. Denies any recent injuries or falls. States to feel nauseous however denies any vomit, diarrhea or constipation. Denies any urinary urgency and frequency.

## 2022-08-02 NOTE — ED PROVIDER NOTE - PROGRESS NOTE DETAILS
PA Tiberio- Ab US: Distended gallbladder with stones, sludge, trace pericholecystic fluid   and positive sonographic Gabriel sign. No significant gallbladder   thickening. Findings are concerning for acute cholecystitis. Zosyn ordered. Patient admitted to medicine Dr. Lange who will call GI for consult

## 2022-08-02 NOTE — ED ADULT NURSE NOTE - NSIMPLEMENTINTERV_GEN_ALL_ED
Implemented All Fall with Harm Risk Interventions:  Coin to call system. Call bell, personal items and telephone within reach. Instruct patient to call for assistance. Room bathroom lighting operational. Non-slip footwear when patient is off stretcher. Physically safe environment: no spills, clutter or unnecessary equipment. Stretcher in lowest position, wheels locked, appropriate side rails in place. Provide visual cue, wrist band, yellow gown, etc. Monitor gait and stability. Monitor for mental status changes and reorient to person, place, and time. Review medications for side effects contributing to fall risk. Reinforce activity limits and safety measures with patient and family. Provide visual clues: red socks.

## 2022-08-02 NOTE — ED ADULT TRIAGE NOTE - ARRIVAL INFO ADDITIONAL COMMENTS
Patient BIB self from urgent care with abdominal pain starting at 1500 today. Patient was seen at urgent care and EKG shows Sinus alonzo with abnormal voltage noted. Endorsing nausea, no vomiting or diarrhea. No fevers at home. Patietn takes ASA, Warfarin at home. History of AFib. Alert oriented respirations even and unlabored, normotensive, alonzo to 56.

## 2022-08-02 NOTE — ED ADULT NURSE REASSESSMENT NOTE - NS ED NURSE REASSESS COMMENT FT1
Patient resting in stretcher, Denies pain at this time. Awaiting for Hospitalist for orders. Patient agree with plan of care.

## 2022-08-02 NOTE — H&P ADULT - ASSESSMENT
87F hx of [facility] AVR, PAF on coumadin, HLD, hypothyroid pw acute onset on moderate epigastric pain bandlike that wrapped around to her back associated with nausea with acute cholecystitis.     # Acute cholecystitis.  cont IV antibiotics.   Keep NPO for now  Await Surgery and GI consult.   trend LFts/lipase to exclude choledolcholelithiasis. TB elevated but nl AP and min transaminitis.     #s/p mech AVR and PAF on coumadin.  supratherapeutic INR> hold coumadin tonight.     #HLD  cont statin.     #Hypothyroid  cont synthroid    Pt declined by contacting her  or daughter sec to late hr and states they are aware.

## 2022-08-02 NOTE — H&P ADULT - NSHPLABSRESULTS_GEN_ALL_CORE
11.6   10.81 )-----------( 196      ( 02 Aug 2022 18:10 )             35.4           141  |  103  |  19  ----------------------------<  125<H>  4.4   |  28  |  1.10    Ca    9.4      02 Aug 2022 18:10    TPro  7.8  /  Alb  3.9  /  TBili  2.0<H>  /  DBili  x   /  AST  45<H>  /  ALT  25  /  AlkPhos  69           LIVER FUNCTIONS - ( 02 Aug 2022 18:10 )  Alb: 3.9 g/dL / Pro: 7.8 g/dL / ALK PHOS: 69 U/L / ALT: 25 U/L / AST: 45 U/L / GGT: x             Lipase, Serum: 167 U/L (22 @ 18:10)    PT/INR - ( 02 Aug 2022 19:10 )   PT: 49.9 sec;   INR: 4.24 ratio         PTT - ( 02 Aug 2022 19:10 )  PTT:41.5 sec          Urinalysis Basic - ( 02 Aug 2022 21:40 )    Color: Yellow / Appearance: Clear / S.010 / pH: x  Gluc: x / Ketone: Negative  / Bili: Negative / Urobili: Negative   Blood: x / Protein: 15 / Nitrite: Negative   Leuk Esterase: Negative / RBC: 11-25 /HPF / WBC Negative /HPF   Sq Epi: x / Non Sq Epi: Neg.-Few / Bacteria: Few /HPF      EKG: personally rev. sinus alonzo at 53bpm, no acute ST changes.       < from: CT Angio Abdomen and Pelvis w/ IV Cont (22 @ 20:01) >    IMPRESSION:  No evidence of aortic dissection    Cholelithiasis in a distended gallbladder. No pericholecystic   inflammatory change appreciated    < end of copied text >    < from: US Abdomen Upper Quadrant Right (22 @ 19:17) >    IMPRESSION:    Distended gallbladder with stones, sludge, trace pericholecystic fluid   and positive sonographic Gabriel sign. No significant gallbladder   thickening. Findings are concerning for acute cholecystitis given the   positive Gabriel's sign. Suggest HIDA scan for confirmation.    Common bile duct size of 9mm upper limits of normal for patient age.      < end of copied text >

## 2022-08-02 NOTE — H&P ADULT - NSHPSOCIALHISTORY_GEN_ALL_CORE
FMHx: no hx of CAD, CVA, cancers, both parents passed sec to old age.  Sochx: denied tob, ETOH or illicit drug use.   lives with .

## 2022-08-02 NOTE — ED PROVIDER NOTE - CLINICAL SUMMARY MEDICAL DECISION MAKING FREE TEXT BOX
86 y/o F with h/o AVR, Afib (Coumadin), Hypothyroidism, HLD pw sudden onset bandlike epigastric pain radiating to her back starting at 3pm today while at rest accompanied with nausea. Denies fever, chills, headache,  shortness of breath, vomiting, diarrhea, dysuria, hematuria, weakness, numbness, tingling.   PMD- Jazmyne  Cardio- Cheri  Surg hx: Appendectomy   Plan: Will obtain basic labs with lipase, Trop, UA, UCX, RUQ US, CTA chest/abd/pelvis r/o dissection, cardiac monitoring, Pepcid/ Zofran/Morphine, reassess

## 2022-08-02 NOTE — ED PROVIDER NOTE - CONSTITUTIONAL, MLM
Well appearing, awake, alert, oriented to person, place, time/situation and in no apparent distress. Follow up with your pediatrician within 48 hours of discharge. normal...

## 2022-08-02 NOTE — H&P ADULT - NSHPREVIEWOFSYSTEMS_GEN_ALL_CORE
CONSTITUTIONAL: No fever, weight loss, or fatigue  EYES: No eye pain, visual disturbances, or discharge  ENMT:  No difficulty hearing, tinnitus, vertigo; No sinus or throat pain  NECK: No pain or stiffness  RESPIRATORY: No cough, wheezing, chills or hemoptysis; No shortness of breath  CARDIOVASCULAR: No chest pain, palpitations, dizziness, or leg swelling  GASTROINTESTINAL: + epigastric pain with nausea, no vomiting, or hematemesis; No diarrhea or constipation. No melena or hematochezia.  GENITOURINARY: No dysuria, frequency, hematuria, or incontinence  NEUROLOGICAL: No headaches, memory loss, loss of strength, numbness, or tremors  SKIN: No itching, burning, rashes, or lesions   LYMPH NODES: No enlarged glands  ENDOCRINE: No heat or cold intolerance; No hair loss  MUSCULOSKELETAL: No joint pain or swelling; No muscle, back, or extremity pain  PSYCHIATRIC: No depression, anxiety, mood swings, or difficulty sleeping  HEME/LYMPH: No easy bruising, or bleeding gums  ALLERY AND IMMUNOLOGIC: No hives or eczema    IMPROVE VTE Individual Risk Assessment          RISK                                                          Points  [  ] Previous VTE                                                3  [  ] Thrombophilia                                             2  [  ] Lower limb paralysis                                   2        (unable to hold up >15 seconds)    [  ] Current Cancer                                             2         (within 6 months)  [  ] Immobilization > 24 hrs                              1  [  ] ICU/CCU stay > 24 hours                             1  [1  ] Age > 60                                                         1    IMPROVE VTE Score:         [  1       ]    Total Risk Factor Score:    0 - 1:   Consider IPC  >2 - 3:  Thromboprophylaxis required (enoxaparin or SQ heparin)        >4:   High Risk: Thromboprophylaxis required (enoxaparin or SQ heparin), optional add IPC  **If CONTRAINDICATION to enoxaparin or SQ heparin, USE IPCs**

## 2022-08-02 NOTE — H&P ADULT - NSHPPHYSICALEXAM_GEN_ALL_CORE
Vital Signs Last 24 Hrs  T(C): 36.6 (02 Aug 2022 19:56), Max: 37.1 (02 Aug 2022 17:50)  T(F): 97.9 (02 Aug 2022 19:56), Max: 98.7 (02 Aug 2022 17:50)  HR: 56 (02 Aug 2022 17:50) (56 - 56)  BP: 121/66 (02 Aug 2022 17:50) (121/66 - 121/66)  BP(mean): --  RR: 18 (02 Aug 2022 17:50) (18 - 18)  SpO2: 96% (02 Aug 2022 17:50) (96% - 96%)    Parameters below as of 02 Aug 2022 17:50  Patient On (Oxygen Delivery Method): room air      Daily Height in cm: 162.56 (02 Aug 2022 17:50)    Daily   CAPILLARY BLOOD GLUCOSE        I&O's Summary      GENERAL: NAD  HEAD:  Normocephalic  EYES: EOMI, PERRLA, conjunctiva and sclera clear  ENMT: No tonsillar erythema, exudates, or enlargement; Moist mucous membranes, No lesions  NECK: Supple, No JVD, no bruit, normal thyroid  NERVOUS SYSTEM:  Alert & Oriented X3, Good concentration; grossly  Motor Strength 5/5 B/L upper and lower extremities; DTRs 2+ intact and symmetric  CHEST/LUNG: Clear to auscultation bilaterally; No rales, rhonchi, wheezing, or rubs  HEART: Regular rate and rhythm; no murmurs, rubs, or gallops  ABDOMEN: Soft, mild RUQ tenderness on palp, Nondistended; Bowel sounds present. NO CVA tenderness.   EXTREMITIES:  2+ Peripheral Pulses, No clubbing, cyanosis, or edema  LYMPH: No lymphadenopathy noted  SKIN: No rashes or lesions

## 2022-08-03 DIAGNOSIS — R10.9 UNSPECIFIED ABDOMINAL PAIN: ICD-10-CM

## 2022-08-03 DIAGNOSIS — Z90.49 ACQUIRED ABSENCE OF OTHER SPECIFIED PARTS OF DIGESTIVE TRACT: Chronic | ICD-10-CM

## 2022-08-03 DIAGNOSIS — Z95.2 PRESENCE OF PROSTHETIC HEART VALVE: Chronic | ICD-10-CM

## 2022-08-03 LAB
ALBUMIN SERPL ELPH-MCNC: 3.4 G/DL — SIGNIFICANT CHANGE UP (ref 3.3–5)
ALP SERPL-CCNC: 66 U/L — SIGNIFICANT CHANGE UP (ref 40–120)
ALT FLD-CCNC: 66 U/L — HIGH (ref 10–45)
ANION GAP SERPL CALC-SCNC: 8 MMOL/L — SIGNIFICANT CHANGE UP (ref 5–17)
AST SERPL-CCNC: 89 U/L — HIGH (ref 10–40)
BASOPHILS # BLD AUTO: 0.04 K/UL — SIGNIFICANT CHANGE UP (ref 0–0.2)
BASOPHILS NFR BLD AUTO: 0.6 % — SIGNIFICANT CHANGE UP (ref 0–2)
BILIRUB DIRECT SERPL-MCNC: 0.4 MG/DL — HIGH (ref 0–0.3)
BILIRUB INDIRECT FLD-MCNC: 2.1 MG/DL — HIGH (ref 0.2–1)
BILIRUB SERPL-MCNC: 2 MG/DL — HIGH (ref 0.2–1.2)
BILIRUB SERPL-MCNC: 2.5 MG/DL — HIGH (ref 0.2–1.2)
BUN SERPL-MCNC: 16 MG/DL — SIGNIFICANT CHANGE UP (ref 7–23)
CALCIUM SERPL-MCNC: 8.6 MG/DL — SIGNIFICANT CHANGE UP (ref 8.4–10.5)
CHLORIDE SERPL-SCNC: 105 MMOL/L — SIGNIFICANT CHANGE UP (ref 96–108)
CO2 SERPL-SCNC: 28 MMOL/L — SIGNIFICANT CHANGE UP (ref 22–31)
CREAT SERPL-MCNC: 1.18 MG/DL — SIGNIFICANT CHANGE UP (ref 0.5–1.3)
CULTURE RESULTS: SIGNIFICANT CHANGE UP
EGFR: 45 ML/MIN/1.73M2 — LOW
EOSINOPHIL # BLD AUTO: 0.11 K/UL — SIGNIFICANT CHANGE UP (ref 0–0.5)
EOSINOPHIL NFR BLD AUTO: 1.6 % — SIGNIFICANT CHANGE UP (ref 0–6)
GLUCOSE SERPL-MCNC: 96 MG/DL — SIGNIFICANT CHANGE UP (ref 70–99)
HCT VFR BLD CALC: 33.2 % — LOW (ref 34.5–45)
HGB BLD-MCNC: 11.2 G/DL — LOW (ref 11.5–15.5)
IMM GRANULOCYTES NFR BLD AUTO: 0.3 % — SIGNIFICANT CHANGE UP (ref 0–1.5)
INR BLD: 4.06 RATIO — HIGH (ref 0.88–1.16)
LYMPHOCYTES # BLD AUTO: 1.86 K/UL — SIGNIFICANT CHANGE UP (ref 1–3.3)
LYMPHOCYTES # BLD AUTO: 27.4 % — SIGNIFICANT CHANGE UP (ref 13–44)
MCHC RBC-ENTMCNC: 31.1 PG — SIGNIFICANT CHANGE UP (ref 27–34)
MCHC RBC-ENTMCNC: 33.7 GM/DL — SIGNIFICANT CHANGE UP (ref 32–36)
MCV RBC AUTO: 92.2 FL — SIGNIFICANT CHANGE UP (ref 80–100)
MONOCYTES # BLD AUTO: 0.65 K/UL — SIGNIFICANT CHANGE UP (ref 0–0.9)
MONOCYTES NFR BLD AUTO: 9.6 % — SIGNIFICANT CHANGE UP (ref 2–14)
NEUTROPHILS # BLD AUTO: 4.12 K/UL — SIGNIFICANT CHANGE UP (ref 1.8–7.4)
NEUTROPHILS NFR BLD AUTO: 60.5 % — SIGNIFICANT CHANGE UP (ref 43–77)
NRBC # BLD: 0 /100 WBCS — SIGNIFICANT CHANGE UP (ref 0–0)
PLATELET # BLD AUTO: 179 K/UL — SIGNIFICANT CHANGE UP (ref 150–400)
POTASSIUM SERPL-MCNC: 4.1 MMOL/L — SIGNIFICANT CHANGE UP (ref 3.5–5.3)
POTASSIUM SERPL-SCNC: 4.1 MMOL/L — SIGNIFICANT CHANGE UP (ref 3.5–5.3)
PROT SERPL-MCNC: 7 G/DL — SIGNIFICANT CHANGE UP (ref 6–8.3)
PROTHROM AB SERPL-ACNC: 47.8 SEC — HIGH (ref 10.5–13.4)
RBC # BLD: 3.6 M/UL — LOW (ref 3.8–5.2)
RBC # FLD: 14.2 % — SIGNIFICANT CHANGE UP (ref 10.3–14.5)
SODIUM SERPL-SCNC: 141 MMOL/L — SIGNIFICANT CHANGE UP (ref 135–145)
SPECIMEN SOURCE: SIGNIFICANT CHANGE UP
WBC # BLD: 6.8 K/UL — SIGNIFICANT CHANGE UP (ref 3.8–10.5)
WBC # FLD AUTO: 6.8 K/UL — SIGNIFICANT CHANGE UP (ref 3.8–10.5)

## 2022-08-03 PROCEDURE — 99233 SBSQ HOSP IP/OBS HIGH 50: CPT

## 2022-08-03 PROCEDURE — 99223 1ST HOSP IP/OBS HIGH 75: CPT | Mod: FS

## 2022-08-03 PROCEDURE — 99222 1ST HOSP IP/OBS MODERATE 55: CPT

## 2022-08-03 RX ADMIN — PIPERACILLIN AND TAZOBACTAM 25 GRAM(S): 4; .5 INJECTION, POWDER, LYOPHILIZED, FOR SOLUTION INTRAVENOUS at 15:42

## 2022-08-03 RX ADMIN — PIPERACILLIN AND TAZOBACTAM 25 GRAM(S): 4; .5 INJECTION, POWDER, LYOPHILIZED, FOR SOLUTION INTRAVENOUS at 21:50

## 2022-08-03 RX ADMIN — PIPERACILLIN AND TAZOBACTAM 25 GRAM(S): 4; .5 INJECTION, POWDER, LYOPHILIZED, FOR SOLUTION INTRAVENOUS at 05:55

## 2022-08-03 RX ADMIN — PANTOPRAZOLE SODIUM 40 MILLIGRAM(S): 20 TABLET, DELAYED RELEASE ORAL at 05:54

## 2022-08-03 RX ADMIN — Medication 88 MICROGRAM(S): at 05:55

## 2022-08-03 RX ADMIN — SODIUM CHLORIDE 60 MILLILITER(S): 9 INJECTION INTRAMUSCULAR; INTRAVENOUS; SUBCUTANEOUS at 00:40

## 2022-08-03 NOTE — CONSULT NOTE ADULT - PROBLEM SELECTOR RECOMMENDATION 9
Possible Choledocholithiases  Clear liquid diet  NPO after midnight  MRCP tomorrow  Possible ERCP based on MRCP findings, symptoms and labs  Will need to correct INR  Cardiology clearance requested   Discussed plan with patient and medicine

## 2022-08-03 NOTE — PROGRESS NOTE ADULT - SUBJECTIVE AND OBJECTIVE BOX
Patient is a 87y old  Female who presents with a chief complaint of cholecystitis (02 Aug 2022 23:58)      Patient seen and examined at bedside.    ALLERGIES:  penicillin (Rash)  sulfa drugs (Rash)    MEDICATIONS  (STANDING):  atorvastatin 80 milliGRAM(s) Oral at bedtime  levothyroxine 88 MICROGram(s) Oral daily  metoprolol succinate ER 50 milliGRAM(s) Oral daily  pantoprazole    Tablet 40 milliGRAM(s) Oral before breakfast  piperacillin/tazobactam IVPB.. 3.375 Gram(s) IV Intermittent every 8 hours  sodium chloride 0.9%. 500 milliLiter(s) (60 mL/Hr) IV Continuous <Continuous>    MEDICATIONS  (PRN):  acetaminophen     Tablet .. 650 milliGRAM(s) Oral every 6 hours PRN Temp greater or equal to 38C (100.4F), Mild Pain (1 - 3)    Vital Signs Last 24 Hrs  T(F): 98.2 (03 Aug 2022 05:32), Max: 98.7 (02 Aug 2022 17:50)  HR: 56 (03 Aug 2022 05:32) (55 - 57)  BP: 107/48 (03 Aug 2022 05:32) (107/48 - 156/70)  RR: 17 (03 Aug 2022 05:32) (17 - 18)  SpO2: 95% (03 Aug 2022 05:32) (95% - 96%)  I&O's Summary    02 Aug 2022 07:01  -  03 Aug 2022 07:00  --------------------------------------------------------  IN: 0 mL / OUT: 1 mL / NET: -1 mL      BMI (kg/m2): 24.2 (- @ 00:56)  PHYSICAL EXAM:  General: NAD, A/O x 3  ENT: MMM, no scleral icterus  Neck: Supple, No JVD, no thyroidomegaly  Lungs: Clear to auscultation bilaterally, no wheezes, no rales, no rhonchi, good inspiratory effort  Cardio: RRR, S1/S2, No murmurs  Abdomen: Soft, Nontender, Nondistended; Bowel sounds present  Extremities: No calf tenderness, No pitting edema, no skin changes    LABS:                        11.2   6.80  )-----------( 179      ( 03 Aug 2022 06:47 )             33.2           141  |  103  |  19  ----------------------------<  125  4.4   |  28  |  1.10    Ca    9.4      02 Aug 2022 18:10    TPro  7.8  /  Alb  3.9  /  TBili  2.0  /  DBili  x   /  AST  45  /  ALT  25  /  AlkPhos  69         PT/INR - ( 02 Aug 2022 19:10 )   PT: 49.9 sec;   INR: 4.24 ratio    PTT - ( 02 Aug 2022 19:10 )  PTT:41.5 sec     Urinalysis Basic - ( 02 Aug 2022 21:40 )    Color: Yellow / Appearance: Clear / S.010 / pH: x  Gluc: x / Ketone: Negative  / Bili: Negative / Urobili: Negative   Blood: x / Protein: 15 / Nitrite: Negative   Leuk Esterase: Negative / RBC: 11-25 /HPF / WBC Negative /HPF   Sq Epi: x / Non Sq Epi: Neg.-Few / Bacteria: Few /HPF    COVID-19 PCR: NotDetec (22 @ 18:10)  COVID-19 PCR: NotDetec (22 @ 18:10)    RADIOLOGY & ADDITIONAL TESTS:  CT Angio Abdomen and Pelvis w/ IV Cont (22 @ 20:01) >  IMPRESSION:  No evidence of aortic dissection  Cholelithiasis in a distended gallbladder. No pericholecystic   inflammatory change appreciated    Care Discussed with Consultants/Other Providers:    Patient is a 87y old  Female who presents with a chief complaint of cholecystitis (02 Aug 2022 23:58)    Denies N/V, abd pain  Reports feeling much improved  Patient seen and examined at bedside.    ALLERGIES:  penicillin (Rash)  sulfa drugs (Rash)    MEDICATIONS  (STANDING):  atorvastatin 80 milliGRAM(s) Oral at bedtime  levothyroxine 88 MICROGram(s) Oral daily  metoprolol succinate ER 50 milliGRAM(s) Oral daily  pantoprazole    Tablet 40 milliGRAM(s) Oral before breakfast  piperacillin/tazobactam IVPB.. 3.375 Gram(s) IV Intermittent every 8 hours  sodium chloride 0.9%. 500 milliLiter(s) (60 mL/Hr) IV Continuous <Continuous>    MEDICATIONS  (PRN):  acetaminophen     Tablet .. 650 milliGRAM(s) Oral every 6 hours PRN Temp greater or equal to 38C (100.4F), Mild Pain (1 - 3)    Vital Signs Last 24 Hrs  T(F): 98.2 (03 Aug 2022 05:32), Max: 98.7 (02 Aug 2022 17:50)  HR: 56 (03 Aug 2022 05:32) (55 - 57)  BP: 107/48 (03 Aug 2022 05:32) (107/48 - 156/70)  RR: 17 (03 Aug 2022 05:32) (17 - 18)  SpO2: 95% (03 Aug 2022 05:32) (95% - 96%)  I&O's Summary    02 Aug 2022 07:01  -  03 Aug 2022 07:00  --------------------------------------------------------  IN: 0 mL / OUT: 1 mL / NET: -1 mL      BMI (kg/m2): 24.2 (22 @ 00:56)  PHYSICAL EXAM:  General: NAD, A/O x 3  ENT: MMM, no scleral icterus  Neck: Supple, No JVD, no thyroidomegaly  Lungs: Clear to auscultation bilaterally, no wheezes, no rales, no rhonchi, good inspiratory effort  Cardio: RRR, S1/S2, No murmurs  Abdomen: Soft, Nontender, Nondistended; Bowel sounds present  Extremities: No calf tenderness, No pitting edema, no skin changes    LABS:                        11.2   6.80  )-----------( 179      ( 03 Aug 2022 06:47 )             33.2           141  |  103  |  19  ----------------------------<  125  4.4   |  28  |  1.10    Ca    9.4      02 Aug 2022 18:10    TPro  7.8  /  Alb  3.9  /  TBili  2.0  /  DBili  x   /  AST  45  /  ALT  25  /  AlkPhos  69         PT/INR - ( 02 Aug 2022 19:10 )   PT: 49.9 sec;   INR: 4.24 ratio    PTT - ( 02 Aug 2022 19:10 )  PTT:41.5 sec     Urinalysis Basic - ( 02 Aug 2022 21:40 )    Color: Yellow / Appearance: Clear / S.010 / pH: x  Gluc: x / Ketone: Negative  / Bili: Negative / Urobili: Negative   Blood: x / Protein: 15 / Nitrite: Negative   Leuk Esterase: Negative / RBC: 11-25 /HPF / WBC Negative /HPF   Sq Epi: x / Non Sq Epi: Neg.-Few / Bacteria: Few /HPF    COVID-19 PCR: NotDetec (22 @ 18:10)  COVID-19 PCR: NotDetec (22 @ 18:10)    RADIOLOGY & ADDITIONAL TESTS:  CT Angio Abdomen and Pelvis w/ IV Cont (22 @ 20:01) >  IMPRESSION:  No evidence of aortic dissection  Cholelithiasis in a distended gallbladder. No pericholecystic   inflammatory change appreciated    Care Discussed with Consultants/Other Providers:   GI and surgery consulted

## 2022-08-03 NOTE — CONSULT NOTE ADULT - ASSESSMENT
GI consulted to see patient due to abdominal pain possible choledocholithiases Patient seen and examined. She notes having epigastric pain that wrapped around her belly to her back yesterday afternoon. She denies eating before or when the pain occurred. No nausea/vomiting. No new medication. Denies previous history of symptoms. No fever/chills. Denies dark urine or yellowing of the skin/eyes.     CT abdomen:  No evidence of aortic dissection    Cholelithiasis in a distended gallbladder. No pericholecystic   inflammatory change appreciated    Noted to have elevated bilirubin of 2.5

## 2022-08-03 NOTE — CONSULT NOTE ADULT - SUBJECTIVE AND OBJECTIVE BOX
INTERVAL HPI/OVERNIGHT EVENTS:  HPI:  87F hx of [facility] AVR, PAF on coumadin, HLD, hypothyroid pw acute onset on moderate epigastric pain bandlike that wrapped around to her back associated with nausea started this afternoon. Denied any fevers or chills, SOB, CP, palps, diaphoresis. Sxs were persistent and came to ED. In ED afebrile P: 56 BP:121/66 sat 96% on RA. WBC: 10.81 80% N INR: 4.24 cr: 1.1 TB: 2 AST/ALT 45/25 UA mod blood. CT chest and abd negative for dissection but with cholelithiasis in distended GB. Abd sono with distend GB with stones, sludge, +Gabriel CBD 9mm upper limits of normal. Currently pt feels improved with min sxs.  (02 Aug 2022 23:58)    GI consulted to see     MEDICATIONS  (STANDING):  levothyroxine 88 MICROGram(s) Oral daily  metoprolol succinate ER 50 milliGRAM(s) Oral daily  pantoprazole    Tablet 40 milliGRAM(s) Oral before breakfast  piperacillin/tazobactam IVPB.. 3.375 Gram(s) IV Intermittent every 8 hours  sodium chloride 0.9%. 500 milliLiter(s) (60 mL/Hr) IV Continuous <Continuous>    MEDICATIONS  (PRN):  acetaminophen     Tablet .. 650 milliGRAM(s) Oral every 6 hours PRN Temp greater or equal to 38C (100.4F), Mild Pain (1 - 3)      Allergies    penicillin (Rash)  sulfa drugs (Rash)    Intolerances        PAST MEDICAL & SURGICAL HISTORY:  S/P AVR      Atrial fibrillation      Hypothyroidism      History of appendectomy      S/P AVR          REVIEW OF SYSTEMS: negative unless indicated in HPI    non smoker   no etoh abuse     PHYSICAL EXAM:   Vital Signs:  Vital Signs Last 24 Hrs  T(C): 36.4 (03 Aug 2022 13:07), Max: 37.1 (02 Aug 2022 17:50)  T(F): 97.5 (03 Aug 2022 13:07), Max: 98.7 (02 Aug 2022 17:50)  HR: 89 (03 Aug 2022 13:07) (55 - 89)  BP: 144/64 (03 Aug 2022 13:07) (107/48 - 156/70)  BP(mean): 68 (03 Aug 2022 05:32) (68 - 68)  RR: 16 (03 Aug 2022 13:07) (16 - 18)  SpO2: 93% (03 Aug 2022 13:07) (93% - 96%)    Parameters below as of 03 Aug 2022 13:07  Patient On (Oxygen Delivery Method): room air      Daily Height in cm: 162.56 (02 Aug 2022 17:50)    Daily Weight in k (03 Aug 2022 05:32)I&O's Summary    02 Aug 2022 07:01  -  03 Aug 2022 07:00  --------------------------------------------------------  IN: 0 mL / OUT: 1 mL / NET: -1 mL        GENERAL:  Appears stated age,  HEENT:  NC/AT,  conjunctivae clear and pink, sclera -anicteric  CHEST:  Full & symmetric excursion, no increased effort, breath sounds clear  HEART:  Regular rhythm, S1, S2, no murmur  ABDOMEN:  Soft, non-tender, non-distended, normoactive bowel sounds  EXTEREMITIES:  no edema  SKIN:  No rash/warm/dry  NEURO:  Alert, oriented, no asterixis, no tremor, no encephalopathy      LABS:                        11.2   6.80  )-----------( 179      ( 03 Aug 2022 06:47 )             33.2     08-03    141  |  105  |  16  ----------------------------<  96  4.1   |  28  |  1.18    Ca    8.6      03 Aug 2022 06:47    TPro  x   /  Alb  x   /  TBili  2.5<H>  /  DBili  0.4<H>  /  AST  x   /  ALT  x   /  AlkPhos  x   08-03    PT/INR - ( 03 Aug 2022 06:47 )   PT: 47.8 sec;   INR: 4.06 ratio         PTT - ( 02 Aug 2022 19:10 )  PTT:41.5 sec  Urinalysis Basic - ( 02 Aug 2022 21:40 )    Color: Yellow / Appearance: Clear / S.010 / pH: x  Gluc: x / Ketone: Negative  / Bili: Negative / Urobili: Negative   Blood: x / Protein: 15 / Nitrite: Negative   Leuk Esterase: Negative / RBC: 11-25 /HPF / WBC Negative /HPF   Sq Epi: x / Non Sq Epi: Neg.-Few / Bacteria: Few /HPF      amylase   lipaseLipase, Serum: 167 U/L ( @ 18:10)    RADIOLOGY & ADDITIONAL TESTS:   INTERVAL HPI/OVERNIGHT EVENTS:  HPI:  87F hx of [facility] AVR, PAF on coumadin, HLD, hypothyroid pw acute onset on moderate epigastric pain bandlike that wrapped around to her back associated with nausea started this afternoon. Denied any fevers or chills, SOB, CP, palps, diaphoresis. Sxs were persistent and came to ED. In ED afebrile P: 56 BP:121/66 sat 96% on RA. WBC: 10.81 80% N INR: 4.24 cr: 1.1 TB: 2 AST/ALT 45/25 UA mod blood. CT chest and abd negative for dissection but with cholelithiasis in distended GB. Abd sono with distend GB with stones, sludge, +Gabriel CBD 9mm upper limits of normal. Currently pt feels improved with min sxs.  (02 Aug 2022 23:58)    GI consulted to see patient due to abdominal pain possible choledocholithiases Patient seen and examined. She notes having epigastric pain that wrapped around her belly to her back yesterday afternoon. She denies eating before or when the pain occurred. No nausea/vomiting. No new medication. Denies previous history of symptoms. No fever/chills. Denies dark urine or yellowing of the skin/eyes.     MEDICATIONS  (STANDING):  levothyroxine 88 MICROGram(s) Oral daily  metoprolol succinate ER 50 milliGRAM(s) Oral daily  pantoprazole    Tablet 40 milliGRAM(s) Oral before breakfast  piperacillin/tazobactam IVPB.. 3.375 Gram(s) IV Intermittent every 8 hours  sodium chloride 0.9%. 500 milliLiter(s) (60 mL/Hr) IV Continuous <Continuous>    MEDICATIONS  (PRN):  acetaminophen     Tablet .. 650 milliGRAM(s) Oral every 6 hours PRN Temp greater or equal to 38C (100.4F), Mild Pain (1 - 3)      Allergies    penicillin (Rash)  sulfa drugs (Rash)    Intolerances        PAST MEDICAL & SURGICAL HISTORY:  S/P AVR      Atrial fibrillation      Hypothyroidism      History of appendectomy      S/P AVR          REVIEW OF SYSTEMS: negative unless indicated in HPI    non smoker   no etoh abuse     PHYSICAL EXAM:   Vital Signs:  Vital Signs Last 24 Hrs  T(C): 36.4 (03 Aug 2022 13:07), Max: 37.1 (02 Aug 2022 17:50)  T(F): 97.5 (03 Aug 2022 13:07), Max: 98.7 (02 Aug 2022 17:50)  HR: 89 (03 Aug 2022 13:07) (55 - 89)  BP: 144/64 (03 Aug 2022 13:07) (107/48 - 156/70)  BP(mean): 68 (03 Aug 2022 05:32) (68 - 68)  RR: 16 (03 Aug 2022 13:07) (16 - 18)  SpO2: 93% (03 Aug 2022 13:07) (93% - 96%)    Parameters below as of 03 Aug 2022 13:07  Patient On (Oxygen Delivery Method): room air      Daily Height in cm: 162.56 (02 Aug 2022 17:50)    Daily Weight in k (03 Aug 2022 05:32)I&O's Summary    02 Aug 2022 07:01  -  03 Aug 2022 07:00  --------------------------------------------------------  IN: 0 mL / OUT: 1 mL / NET: -1 mL        GENERAL:  Appears stated age,  HEENT:  NC/AT,  conjunctivae clear and pink, sclera -anicteric  CHEST:  Full & symmetric excursion, no increased effort, breath sounds clear  HEART:  Regular rhythm, S1, S2, no murmur  ABDOMEN:  Soft, non-tender, non-distended, normoactive bowel sounds  EXTEREMITIES:  no edema  SKIN:  No rash/warm/dry  NEURO:  Alert, oriented, no asterixis, no tremor, no encephalopathy      LABS:                        11.2   6.80  )-----------( 179      ( 03 Aug 2022 06:47 )             33.2     08-03    141  |  105  |  16  ----------------------------<  96  4.1   |  28  |  1.18    Ca    8.6      03 Aug 2022 06:47    TPro  x   /  Alb  x   /  TBili  2.5<H>  /  DBili  0.4<H>  /  AST  x   /  ALT  x   /  AlkPhos  x   08-03    PT/INR - ( 03 Aug 2022 06:47 )   PT: 47.8 sec;   INR: 4.06 ratio         PTT - ( 02 Aug 2022 19:10 )  PTT:41.5 sec  Urinalysis Basic - ( 02 Aug 2022 21:40 )    Color: Yellow / Appearance: Clear / S.010 / pH: x  Gluc: x / Ketone: Negative  / Bili: Negative / Urobili: Negative   Blood: x / Protein: 15 / Nitrite: Negative   Leuk Esterase: Negative / RBC: 11-25 /HPF / WBC Negative /HPF   Sq Epi: x / Non Sq Epi: Neg.-Few / Bacteria: Few /HPF      Lipase, Serum: 167 U/L ( @ 18:10)    RADIOLOGY & ADDITIONAL TESTS:  No evidence of aortic dissection    Cholelithiasis in a distended gallbladder. No pericholecystic   inflammatory change appreciated

## 2022-08-03 NOTE — CONSULT NOTE ADULT - ASSESSMENT
IMPRESSION: Biliary colic - possible acute cholecystitis - first episode    PLAN: Begin PO fluids and advance as tolerated - if tolerated, can be discharged later this afternoon           No plans for surgery at this time

## 2022-08-03 NOTE — CONSULT NOTE ADULT - SUBJECTIVE AND OBJECTIVE BOX
SAMANTHA VIVAS  [id number]      HPI:    Samantha Vivas is an 87 year old woman, follows with cardiologist Dr. Jamie Alonzo with past medical history of Paroxysmal atrial fibrillation (on coumadin), Mechanical Aortic valve replacement ([facility]), Coronary artery disease, Hypertension & Hypothyroidism presented with epigastric pain and nausea.       ALLERGIES:  penicillin (Rash)  sulfa drugs (Rash)      PAST MEDICAL & SURGICAL HISTORY:  Atrial fibrillation  Hypothyroidism  History of appendectomy  S/P AVR      CURRENT MEDICATIONS:  acetaminophen     Tablet .. 650 milliGRAM(s) Oral every 6 hours PRN  levothyroxine 88 MICROGram(s) Oral daily  metoprolol succinate ER 50 milliGRAM(s) Oral daily  pantoprazole    Tablet 40 milliGRAM(s) Oral before breakfast  piperacillin/tazobactam IVPB.. 3.375 Gram(s) IV Intermittent every 8 hours  sodium chloride 0.9%. 500 milliLiter(s) IV Continuous <Continuous>      ROS:  All 10 systems reviewed and positives noted in HPI    OBJECTIVE:    VITAL SIGNS:  Vital Signs Last 24 Hrs  T(C): 36.4 (03 Aug 2022 13:07), Max: 37.1 (02 Aug 2022 17:50)  T(F): 97.5 (03 Aug 2022 13:07), Max: 98.7 (02 Aug 2022 17:50)  HR: 89 (03 Aug 2022 13:07) (55 - 89)  BP: 144/64 (03 Aug 2022 13:07) (107/48 - 156/70)  BP(mean): 68 (03 Aug 2022 05:32) (68 - 68)  RR: 16 (03 Aug 2022 13:07) (16 - 18)  SpO2: 93% (03 Aug 2022 13:07) (93% - 96%)    Parameters below as of 03 Aug 2022 13:07  Patient On (Oxygen Delivery Method): room air        PHYSICAL EXAM:  General: well appearing, no distress  HEENT: sclera anicteric  Neck: supple, no carotid bruits b/l  CVS: JVP ~ 7 cm H20, RRR, s1, s2, no murmurs/rubs/gallops  Chest: unlabored respirations, clear to auscultation b/l  Abdomen: non-distended  Extremities: no lower extremity edema b/l  Neuro: awake, alert & oriented x 3  Psych: normal affect      LABS:                        11.2   6.80  )-----------( 179      ( 03 Aug 2022 06:47 )             33.2     08-03    141  |  105  |  16  ----------------------------<  96  4.1   |  28  |  1.18    Ca    8.6      03 Aug 2022 06:47    TPro  x   /  Alb  x   /  TBili  2.5<H>  /  DBili  0.4<H>  /  AST  x   /  ALT  x   /  AlkPhos  x   08-03        PT/INR - ( 03 Aug 2022 06:47 )   PT: 47.8 sec;   INR: 4.06 ratio         PTT - ( 02 Aug 2022 19:10 )  PTT:41.5 sec      Outpatient TTE (8/2021):  LVEF 69%  Normal RV size and function  Mild MR, Mild mitral stenosis   Moderate TR, Mild IL  Mechanical aortic valve probably normal gradients  No pericardial effusion     ECG (8/2/22): sinus bradycardia with sinus arrhythmia, left axis deviation (similar to outpatient ECG)    RUQ US (8/2/22):  Distended gallbladder with stones, sludge, trace pericholecystic fluid   and positive sonographic Gabriel sign. No significant gallbladder   thickening. Findings are concerning for acute cholecystitis given the   positive Gabriel's sign. Suggest HIDA scan for confirmation.  Common bile duct size of 9 mm upper limits of normal for patient age.      CXR (8/2/22):  IMPRESSION: No acute finding or change.    CT Abdomen (8/2/22):  No evidence of aortic dissection    Cholelithiasis in a distended gallbladder. No pericholecystic   inflammatory change appreciated         SAMANTHA VIVAS  [id number]      HPI:    Samantha Vivas is an 87 year old woman, follows with cardiologist Dr. Jamie Alonzo with past medical history of Paroxysmal atrial fibrillation (on coumadin), Mechanical Aortic valve replacement ([facility]), Coronary artery disease, Hypertension & Hypothyroidism presented with abdominal pain and nausea.     The patient reports that yesterday she had an intense episode of mid abdominal pain that radiated as a band across her abdomen to her back. Denies any substernal chest discomfort. Denies any exertional chest pain or shortness of breath. Reports a recent fall with subsequent mild left leg swelling, denies any syncope. Denies palpitations. Reports her INR goal is 2.5-3.5.      ALLERGIES:  penicillin (Rash)  sulfa drugs (Rash)      PAST MEDICAL & SURGICAL HISTORY:  Atrial fibrillation  Hypothyroidism  History of appendectomy  S/P AVR      CURRENT MEDICATIONS:  acetaminophen     Tablet .. 650 milliGRAM(s) Oral every 6 hours PRN  levothyroxine 88 MICROGram(s) Oral daily  metoprolol succinate ER 50 milliGRAM(s) Oral daily  pantoprazole    Tablet 40 milliGRAM(s) Oral before breakfast  piperacillin/tazobactam IVPB.. 3.375 Gram(s) IV Intermittent every 8 hours  sodium chloride 0.9%. 500 milliLiter(s) IV Continuous <Continuous>      ROS:  All 10 systems reviewed and positives noted in HPI    OBJECTIVE:    VITAL SIGNS:  Vital Signs Last 24 Hrs  T(C): 36.4 (03 Aug 2022 13:07), Max: 37.1 (02 Aug 2022 17:50)  T(F): 97.5 (03 Aug 2022 13:07), Max: 98.7 (02 Aug 2022 17:50)  HR: 89 (03 Aug 2022 13:07) (55 - 89)  BP: 144/64 (03 Aug 2022 13:07) (107/48 - 156/70)  BP(mean): 68 (03 Aug 2022 05:32) (68 - 68)  RR: 16 (03 Aug 2022 13:07) (16 - 18)  SpO2: 93% (03 Aug 2022 13:07) (93% - 96%)    Parameters below as of 03 Aug 2022 13:07  Patient On (Oxygen Delivery Method): room air        PHYSICAL EXAM:  General: elderly woman, no acute distress  HEENT: sclera anicteric  Neck: supple  CVS: JVP ~ 7 cm H20, RRR, s1, s2, no murmurs, mechanical click   Chest: unlabored respirations, clear to auscultation b/l  Extremities: trace left lower extremity edema b/l  Neuro: awake, alert & oriented x 3  Psych: normal affect      LABS:                        11.2   6.80  )-----------( 179      ( 03 Aug 2022 06:47 )             33.2     08-03    141  |  105  |  16  ----------------------------<  96  4.1   |  28  |  1.18    Ca    8.6      03 Aug 2022 06:47    TPro  x   /  Alb  x   /  TBili  2.5<H>  /  DBili  0.4<H>  /  AST  x   /  ALT  x   /  AlkPhos  x   08-03        PT/INR - ( 03 Aug 2022 06:47 )   PT: 47.8 sec;   INR: 4.06 ratio         PTT - ( 02 Aug 2022 19:10 )  PTT:41.5 sec      Outpatient TTE (8/2021):  LVEF 69%  Normal RV size and function  Mild MR, Mild mitral stenosis   Moderate TR, Mild CA  Mechanical aortic valve probably normal gradients  No pericardial effusion     ECG (8/2/22): sinus bradycardia with sinus arrhythmia, left axis deviation (similar to outpatient ECG)    RUQ US (8/2/22):  Distended gallbladder with stones, sludge, trace pericholecystic fluid   and positive sonographic Gabriel sign. No significant gallbladder   thickening. Findings are concerning for acute cholecystitis given the   positive Gabriel's sign. Suggest HIDA scan for confirmation.  Common bile duct size of 9 mm upper limits of normal for patient age.      CXR (8/2/22):  IMPRESSION: No acute finding or change.    CT Abdomen (8/2/22):  No evidence of aortic dissection    Cholelithiasis in a distended gallbladder. No pericholecystic   inflammatory change appreciated

## 2022-08-03 NOTE — CONSULT NOTE ADULT - ASSESSMENT
Assessment:  Samantha Gillespie is an 87 year old woman, follows with cardiologist Dr. Jamie Alonzo with past medical history of Paroxysmal atrial fibrillation (on coumadin), Aortic valve replacement ([facility]), Coronary artery disease, Hypertension & Hypothyroidism presented with epigastric pain and nausea, found to have acute cholecystitis.  Assessment:  Samantha Gillespie is an 87 year old woman, follows with cardiologist Dr. Jamie Alonzo with past medical history of Paroxysmal atrial fibrillation (on coumadin), Mechanical Aortic valve replacement ([facility]), Coronary artery disease, Hypertension & Hypothyroidism presented with epigastric pain and nausea, found to have acute cholecystitis.     Cardiology consulted for pre-operative cardiac risk stratification prior to MRCP and ERCP. ECG consistent with sinus bradycardia and left axis deviation, no acute ischemic ST abnormalities. Prior echocardiogram form 8/2021 consistent with normal LV and RV systolic function, mechanical aortic valve reported to have normal gradients, moderate tricuspid regurgitation. Patient appears to have an exercise tolerance > 4 METs with no exertional angina or dyspnea. No signs of CHF on exam.    Recommendations:  [] Pre-operative cardiac risk stratification: The patient is at moderate cardiovascular risk prior to MRCP, ERCP. The patient must remain on therapeutic anticoagulation for her mechanical aortic valve/PAF if coumadin has to be held for any upcoming procedures. Would recommend heparin drip if coumadin being held. Continue home metoprolol. No need for INR reversal. Monitor INR daily, goal appears to be higher at 2.5-3.5, message sent to her cardiologist, Dr. Jamie Alonzo    Will sign out to cardiology team to follow along tomorrow.    Gunjan Pruitt MD  Cardiology

## 2022-08-03 NOTE — CONSULT NOTE ADULT - CONSULT REASON
Pre-operative cardiac risk stratification prior to ERCP Pre-operative cardiac risk stratification prior to MRCP, ERCP

## 2022-08-03 NOTE — PROGRESS NOTE ADULT - ASSESSMENT
87F hx of [facility] AVR, PAF on coumadin, HLD, hypothyroid pw acute onset on moderate epigastric pain bandlike that wrapped around to her back associated with nausea with acute cholecystitis.     # Acute cholecystitis.  cont IV antibiotics.   Keep NPO for now  Await Surgery and GI consult.   trend LFts/lipase to exclude choledolcholelithiasis. TB elevated but nl AP and min transaminitis.     #s/p mech AVR and PAF on coumadin.  supratherapeutic INR> hold coumadin tonight.     #HLD  cont statin.     #Hypothyroid  cont synthroid    Pt declined by contacting her  or daughter sec to late hr and states they are aware.        87F hx of [facility] AVR, PAF on coumadin, HLD, hypothyroid pw acute onset on moderate epigastric pain bandlike that wrapped around to her back associated with nausea with acute cholecystitis.     # Acute cholecystitis with mild transaminitis  CONSULT: GI and surgery; will f/u recommendations  cont IV antibiotics, IVF  Keep NPO for now  trend LFts/lipase to exclude choledolcholelithiasis. TB elevated but nl AP and min transaminitis.     #s/p mech AVR and PAF on coumadin.  supratherapeutic INR> hold coumadin tonight.     #HLD  Will hold lipitor 80mg qhs in view of transaminitis    #Hypothyroid  cont synthroid    DVT PPX: Holding coumadin for supratherapeutic INR  AM labs, Full code    Pt declined by contacting her  or daughter sec to late hr and states they are aware.

## 2022-08-03 NOTE — PATIENT PROFILE ADULT - FALL HARM RISK - HARM RISK INTERVENTIONS

## 2022-08-03 NOTE — CONSULT NOTE ADULT - SUBJECTIVE AND OBJECTIVE BOX
Full note to follow                  PAST MEDICAL & SURGICAL HISTORY:  No pertinent past medical history.  No pertinent surgical procedures.    PFSH: All noncontributory    MEDICATIONS REVIEWED:acetaminophen     Tablet .. 650 milliGRAM(s) Oral every 6 hours PRN  levothyroxine 88 MICROGram(s) Oral daily  metoprolol succinate ER 50 milliGRAM(s) Oral daily  pantoprazole    Tablet 40 milliGRAM(s) Oral before breakfast  piperacillin/tazobactam IVPB.. 3.375 Gram(s) IV Intermittent every 8 hours  sodium chloride 0.9%. 500 milliLiter(s) IV Continuous <Continuous>      ALLERGIES:penicillin (Rash)  sulfa drugs (Rash)      REVIEW OF SYSTEMS:  Comprehensive Review of Systems negative except as noted in HPI      PHYSICAL EXAMINATION:  RESPIRATORY: Clear to auscultation bilaterally, respirations non-labored.  CARDIAC: RR, normal S1S2, no murmurs.  ABDOMEN: soft, BS present, no masses, no hernias, no peritoneal signs, no KLS, nontender.  VASCULAR: Equal and normal pulses.  MUSCULOSKELETAL: Full ROM, no deformities.      T(C): 36.8 (08-03-22 @ 05:32), Max: 37.1 (08-02-22 @ 17:50)  HR: 56 (08-03-22 @ 05:32) (55 - 57)  BP: 107/48 (08-03-22 @ 05:32) (107/48 - 156/70)  RR: 17 (08-03-22 @ 05:32) (17 - 18)  SpO2: 95% (08-03-22 @ 05:32) (95% - 96%)                          11.2   6.80  )-----------( 179      ( 03 Aug 2022 06:47 )             33.2       08-03    141  |  105  |  16  ----------------------------<  96  4.1   |  28  |  1.18    Ca    8.6      03 Aug 2022 06:47    TPro  7.0  /  Alb  3.4  /  TBili  2.0<H>  /  DBili  x   /  AST  89<H>  /  ALT  66<H>  /  AlkPhos  66  08-03

## 2022-08-04 ENCOUNTER — TRANSCRIPTION ENCOUNTER (OUTPATIENT)
Age: 87
End: 2022-08-04

## 2022-08-04 ENCOUNTER — APPOINTMENT (OUTPATIENT)
Dept: MRI IMAGING | Facility: HOSPITAL | Age: 87
End: 2022-08-04

## 2022-08-04 VITALS
OXYGEN SATURATION: 97 % | TEMPERATURE: 98 F | DIASTOLIC BLOOD PRESSURE: 78 MMHG | HEART RATE: 58 BPM | RESPIRATION RATE: 18 BRPM | SYSTOLIC BLOOD PRESSURE: 121 MMHG

## 2022-08-04 LAB
ALBUMIN SERPL ELPH-MCNC: 3.7 G/DL — SIGNIFICANT CHANGE UP (ref 3.3–5)
ALP SERPL-CCNC: 67 U/L — SIGNIFICANT CHANGE UP (ref 40–120)
ALT FLD-CCNC: 49 U/L — HIGH (ref 10–45)
ANION GAP SERPL CALC-SCNC: 8 MMOL/L — SIGNIFICANT CHANGE UP (ref 5–17)
APTT BLD: 42.7 SEC — HIGH (ref 27.5–35.5)
AST SERPL-CCNC: 58 U/L — HIGH (ref 10–40)
BILIRUB DIRECT SERPL-MCNC: 0.3 MG/DL — SIGNIFICANT CHANGE UP (ref 0–0.3)
BILIRUB INDIRECT FLD-MCNC: 2.1 MG/DL — HIGH (ref 0.2–1)
BILIRUB SERPL-MCNC: 2.4 MG/DL — HIGH (ref 0.2–1.2)
BUN SERPL-MCNC: 11 MG/DL — SIGNIFICANT CHANGE UP (ref 7–23)
CALCIUM SERPL-MCNC: 9.5 MG/DL — SIGNIFICANT CHANGE UP (ref 8.4–10.5)
CHLORIDE SERPL-SCNC: 101 MMOL/L — SIGNIFICANT CHANGE UP (ref 96–108)
CO2 SERPL-SCNC: 29 MMOL/L — SIGNIFICANT CHANGE UP (ref 22–31)
CREAT SERPL-MCNC: 1.18 MG/DL — SIGNIFICANT CHANGE UP (ref 0.5–1.3)
EGFR: 45 ML/MIN/1.73M2 — LOW
GLUCOSE SERPL-MCNC: 91 MG/DL — SIGNIFICANT CHANGE UP (ref 70–99)
HCT VFR BLD CALC: 35.4 % — SIGNIFICANT CHANGE UP (ref 34.5–45)
HGB BLD-MCNC: 11.7 G/DL — SIGNIFICANT CHANGE UP (ref 11.5–15.5)
INR BLD: 3.74 RATIO — HIGH (ref 0.88–1.16)
MAGNESIUM SERPL-MCNC: 2 MG/DL — SIGNIFICANT CHANGE UP (ref 1.6–2.6)
MCHC RBC-ENTMCNC: 30.7 PG — SIGNIFICANT CHANGE UP (ref 27–34)
MCHC RBC-ENTMCNC: 33.1 GM/DL — SIGNIFICANT CHANGE UP (ref 32–36)
MCV RBC AUTO: 92.9 FL — SIGNIFICANT CHANGE UP (ref 80–100)
NRBC # BLD: 0 /100 WBCS — SIGNIFICANT CHANGE UP (ref 0–0)
PLATELET # BLD AUTO: 191 K/UL — SIGNIFICANT CHANGE UP (ref 150–400)
POTASSIUM SERPL-MCNC: 4.2 MMOL/L — SIGNIFICANT CHANGE UP (ref 3.5–5.3)
POTASSIUM SERPL-SCNC: 4.2 MMOL/L — SIGNIFICANT CHANGE UP (ref 3.5–5.3)
PROT SERPL-MCNC: 7.6 G/DL — SIGNIFICANT CHANGE UP (ref 6–8.3)
PROTHROM AB SERPL-ACNC: 44 SEC — HIGH (ref 10.5–13.4)
RBC # BLD: 3.81 M/UL — SIGNIFICANT CHANGE UP (ref 3.8–5.2)
RBC # FLD: 14.2 % — SIGNIFICANT CHANGE UP (ref 10.3–14.5)
SODIUM SERPL-SCNC: 138 MMOL/L — SIGNIFICANT CHANGE UP (ref 135–145)
WBC # BLD: 6.05 K/UL — SIGNIFICANT CHANGE UP (ref 3.8–10.5)
WBC # FLD AUTO: 6.05 K/UL — SIGNIFICANT CHANGE UP (ref 3.8–10.5)

## 2022-08-04 PROCEDURE — 74183 MRI ABD W/O CNTR FLWD CNTR: CPT | Mod: 26

## 2022-08-04 PROCEDURE — 85730 THROMBOPLASTIN TIME PARTIAL: CPT

## 2022-08-04 PROCEDURE — 80076 HEPATIC FUNCTION PANEL: CPT

## 2022-08-04 PROCEDURE — 71275 CT ANGIOGRAPHY CHEST: CPT | Mod: MA

## 2022-08-04 PROCEDURE — 81001 URINALYSIS AUTO W/SCOPE: CPT

## 2022-08-04 PROCEDURE — 80048 BASIC METABOLIC PNL TOTAL CA: CPT

## 2022-08-04 PROCEDURE — 96365 THER/PROPH/DIAG IV INF INIT: CPT

## 2022-08-04 PROCEDURE — A9579: CPT

## 2022-08-04 PROCEDURE — 76705 ECHO EXAM OF ABDOMEN: CPT

## 2022-08-04 PROCEDURE — 82247 BILIRUBIN TOTAL: CPT

## 2022-08-04 PROCEDURE — 71045 X-RAY EXAM CHEST 1 VIEW: CPT

## 2022-08-04 PROCEDURE — 36415 COLL VENOUS BLD VENIPUNCTURE: CPT

## 2022-08-04 PROCEDURE — 74183 MRI ABD W/O CNTR FLWD CNTR: CPT

## 2022-08-04 PROCEDURE — 87635 SARS-COV-2 COVID-19 AMP PRB: CPT

## 2022-08-04 PROCEDURE — 85610 PROTHROMBIN TIME: CPT

## 2022-08-04 PROCEDURE — 99233 SBSQ HOSP IP/OBS HIGH 50: CPT

## 2022-08-04 PROCEDURE — 99232 SBSQ HOSP IP/OBS MODERATE 35: CPT | Mod: FS

## 2022-08-04 PROCEDURE — 87086 URINE CULTURE/COLONY COUNT: CPT

## 2022-08-04 PROCEDURE — 80053 COMPREHEN METABOLIC PANEL: CPT

## 2022-08-04 PROCEDURE — 82248 BILIRUBIN DIRECT: CPT

## 2022-08-04 PROCEDURE — 83735 ASSAY OF MAGNESIUM: CPT

## 2022-08-04 PROCEDURE — 96375 TX/PRO/DX INJ NEW DRUG ADDON: CPT

## 2022-08-04 PROCEDURE — 99231 SBSQ HOSP IP/OBS SF/LOW 25: CPT

## 2022-08-04 PROCEDURE — 83690 ASSAY OF LIPASE: CPT

## 2022-08-04 PROCEDURE — 74174 CTA ABD&PLVS W/CONTRAST: CPT | Mod: MA

## 2022-08-04 PROCEDURE — 85027 COMPLETE CBC AUTOMATED: CPT

## 2022-08-04 PROCEDURE — 99285 EMERGENCY DEPT VISIT HI MDM: CPT | Mod: 25

## 2022-08-04 PROCEDURE — 85025 COMPLETE CBC W/AUTO DIFF WBC: CPT

## 2022-08-04 PROCEDURE — 93005 ELECTROCARDIOGRAM TRACING: CPT

## 2022-08-04 RX ORDER — METOPROLOL TARTRATE 50 MG
1 TABLET ORAL
Qty: 0 | Refills: 0 | DISCHARGE

## 2022-08-04 RX ORDER — SODIUM CHLORIDE 9 MG/ML
1000 INJECTION, SOLUTION INTRAVENOUS
Refills: 0 | Status: DISCONTINUED | OUTPATIENT
Start: 2022-08-04 | End: 2022-08-04

## 2022-08-04 RX ORDER — METOPROLOL TARTRATE 50 MG
1 TABLET ORAL
Qty: 0 | Refills: 0 | DISCHARGE
Start: 2022-08-04

## 2022-08-04 RX ORDER — LEVOTHYROXINE SODIUM 125 MCG
1 TABLET ORAL
Qty: 0 | Refills: 0 | DISCHARGE

## 2022-08-04 RX ORDER — LEVOTHYROXINE SODIUM 125 MCG
1 TABLET ORAL
Qty: 0 | Refills: 0 | DISCHARGE
Start: 2022-08-04

## 2022-08-04 RX ADMIN — PANTOPRAZOLE SODIUM 40 MILLIGRAM(S): 20 TABLET, DELAYED RELEASE ORAL at 05:55

## 2022-08-04 RX ADMIN — Medication 88 MICROGRAM(S): at 05:55

## 2022-08-04 RX ADMIN — SODIUM CHLORIDE 75 MILLILITER(S): 9 INJECTION, SOLUTION INTRAVENOUS at 10:17

## 2022-08-04 RX ADMIN — PIPERACILLIN AND TAZOBACTAM 25 GRAM(S): 4; .5 INJECTION, POWDER, LYOPHILIZED, FOR SOLUTION INTRAVENOUS at 05:55

## 2022-08-04 NOTE — DISCHARGE NOTE PROVIDER - HOSPITAL COURSE
Hospital Course  HPI:  87F hx of [facility] AVR, PAF on coumadin, HLD, hypothyroid pw acute onset on moderate epigastric pain bandlike that wrapped around to her back associated with nausea started this afternoon. Denied any fevers or chills, SOB, CP, palps, diaphoresis. Sxs were persistent and came to ED. In ED afebrile P: 56 BP:121/66 sat 96% on RA. WBC: 10.81 80% N INR: 4.24 cr: 1.1 TB: 2 AST/ALT 45/25 UA mod blood. CT chest and abd negative for dissection but with cholelithiasis in distended GB. Abd sono with distend GB with stones, sludge, +Gabriel CBD 9mm upper limits of normal. Currently pt feels improved with min sxs.  (02 Aug 2022 23:58)    Patient admitted to medicine, surgery and GI consulted, started on zosyn, kept NPO, given IVF  MRCP done 8/4 did not show abnormalities  Patient tolerating diet  Stable for discharge home today  F/U PCP, cardiology, GI  C/w low fat diet   Hospital Course  HPI:  87F hx of [facility] AVR, PAF on coumadin, HLD, hypothyroid pw acute onset on moderate epigastric pain bandlike that wrapped around to her back associated with nausea started this afternoon. Denied any fevers or chills, SOB, CP, palps, diaphoresis. Sxs were persistent and came to ED. In ED afebrile P: 56 BP:121/66 sat 96% on RA. WBC: 10.81 80% N INR: 4.24 cr: 1.1 TB: 2 AST/ALT 45/25 UA mod blood. CT chest and abd negative for dissection but with cholelithiasis in distended GB. Abd sono with distend GB with stones, sludge, +Gabriel CBD 9mm upper limits of normal. Currently pt feels improved with min sxs.  (02 Aug 2022 23:58)    Patient admitted to medicine, surgery and GI consulted, started on zosyn, kept NPO, given IVF  MRCP done 8/4 did not show abnormalities  Patient tolerating diet  Will give augmentin BID for four more days  Stable for discharge home today  F/U PCP, cardiology, GI  C/w low fat diet

## 2022-08-04 NOTE — DISCHARGE NOTE NURSING/CASE MANAGEMENT/SOCIAL WORK - PATIENT PORTAL LINK FT
You can access the FollowMyHealth Patient Portal offered by [facility] by registering at the following website: [email or web address]. By joining [employer]’s FollowMyHealth portal, you will also be able to view your health information using other applications (apps) compatible with our system.

## 2022-08-04 NOTE — PROGRESS NOTE ADULT - SUBJECTIVE AND OBJECTIVE BOX
Patient is a 87y old  Female who presents with a chief complaint of cholecystitis (03 Aug 2022 14:36)    No events overnight  Denies chest pain, SOB  Denies N/V, abd pain    Patient seen and examined at bedside.    ALLERGIES:  penicillin (Rash)  sulfa drugs (Rash)    MEDICATIONS  (STANDING):  levothyroxine 88 MICROGram(s) Oral daily  metoprolol succinate ER 50 milliGRAM(s) Oral daily  pantoprazole    Tablet 40 milliGRAM(s) Oral before breakfast  piperacillin/tazobactam IVPB.. 3.375 Gram(s) IV Intermittent every 8 hours  sodium chloride 0.9%. 500 milliLiter(s) (60 mL/Hr) IV Continuous <Continuous>    MEDICATIONS  (PRN):  acetaminophen     Tablet .. 650 milliGRAM(s) Oral every 6 hours PRN Temp greater or equal to 38C (100.4F), Mild Pain (1 - 3)    Vital Signs Last 24 Hrs  T(F): 98.1 (04 Aug 2022 05:11), Max: 98.1 (04 Aug 2022 05:11)  HR: 64 (04 Aug 2022 05:11) (54 - 89)  BP: 120/57 (04 Aug 2022 05:11) (120/57 - 165/67)  RR: 17 (04 Aug 2022 05:11) (16 - 18)  SpO2: 97% (04 Aug 2022 05:11) (93% - 97%)  I&O's Summary    BMI (kg/m2): 24.2 (22 @ 00:56)  PHYSICAL EXAM:  General: NAD, A/O x 3, speaking in full sentences  ENT: MMM, no scleral icterus  Neck: Supple, No JVD, no thyroidomegaly  Lungs: Clear to auscultation bilaterally, no wheezes, no rales, no rhonchi, good inspiratory effort  Cardio: RRR, S1/S2, No murmurs  Abdomen: Soft, Nontender, Nondistended; Bowel sounds present  Extremities: No calf tenderness, No pitting edema, no skin changes    LABS:                        11.7   6.05  )-----------( 191      ( 04 Aug 2022 06:45 )             35.4       08-04    138  |  101  |  11  ----------------------------<  91  4.2   |  29  |  1.18    Ca    9.5      04 Aug 2022 06:45  Mg     2.0     08-04    TPro  7.6  /  Alb  3.7  /  TBili  2.4  /  DBili  0.3  /  AST  58  /  ALT  49  /  AlkPhos  67  08-04       PT/INR - ( 04 Aug 2022 06:45 )   PT: 44.0 sec;   INR: 3.74 ratio    PTT - ( 04 Aug 2022 06:45 )  PTT:42.7 sec     Urinalysis Basic - ( 02 Aug 2022 21:40 )    Color: Yellow / Appearance: Clear / S.010 / pH: x  Gluc: x / Ketone: Negative  / Bili: Negative / Urobili: Negative   Blood: x / Protein: 15 / Nitrite: Negative   Leuk Esterase: Negative / RBC: 11-25 /HPF / WBC Negative /HPF   Sq Epi: x / Non Sq Epi: Neg.-Few / Bacteria: Few /HPF    Culture - Urine (collected 02 Aug 2022 21:40)  Source: Clean Catch Clean Catch (Midstream)  Final Report (03 Aug 2022 21:59):    <10,000 CFU/mL Normal Urogenital Leila    COVID-19 PCR: NotDetec (22 @ 18:10)  COVID-19 PCR: NotDetec (22 @ 18:10)        RADIOLOGY & ADDITIONAL TESTS:  CT Angio Abdomen and Pelvis w/ IV Cont (22 @ 20:01) >  IMPRESSION:  No evidence of aortic dissection  Cholelithiasis in a distended gallbladder. No pericholecystic   inflammatory change appreciated    Care Discussed with Consultants/Other Providers:   GI FOr MRCP today  Cardiology: No need for INR reversal; heparin drip if INR <2.5

## 2022-08-04 NOTE — DISCHARGE NOTE PROVIDER - CARE PROVIDER_API CALL
Niraj Blair)  Gastroenterology  [address] [place] [address]  Phone: [phone]  Fax: [phone]  Follow Up Time:

## 2022-08-04 NOTE — DISCHARGE NOTE PROVIDER - NSDCFUSCHEDAPPT_GEN_ALL_CORE_FT
Jamie Alonzo  [facility] Physician [facility]  Cardio [address] [facility]  Scheduled Appointment: 08/24/2022    Catalina Reyes  [facility] Physician [facility]  OBGYNGEN [address] [facility] [address]  Scheduled Appointment: 09/07/2022

## 2022-08-04 NOTE — DISCHARGE NOTE PROVIDER - NSDCMRMEDTOKEN_GEN_ALL_CORE_FT
aspirin 81 mg oral tablet: 1 tab(s) orally once a day  Coumadin 3 mg oral tablet: 1 tab(s) orally once a day except on MF  Coumadin 4 mg oral tablet: 1 tab(s) orally once a day on Mon and Friday only  Crestor 20 mg oral tablet: 1 tab(s) orally once a day  levothyroxine 88 mcg (0.088 mg) oral tablet: 1 tab(s) orally once a day  metoprolol succinate 50 mg oral tablet, extended release: 1 tab(s) orally once a day  Zetia 10 mg oral tablet: 1 tab(s) orally once a day   amoxicillin-clavulanate 875 mg-125 mg oral tablet: 1 milligram(s) orally 2 times a day   aspirin 81 mg oral tablet: 1 tab(s) orally once a day  Coumadin 3 mg oral tablet: 1 tab(s) orally once a day except on MF  Coumadin 4 mg oral tablet: 1 tab(s) orally once a day on Mon and Friday only  Crestor 20 mg oral tablet: 1 tab(s) orally once a day  levothyroxine 88 mcg (0.088 mg) oral tablet: 1 tab(s) orally once a day  metoprolol succinate 50 mg oral tablet, extended release: 1 tab(s) orally once a day  Zetia 10 mg oral tablet: 1 tab(s) orally once a day

## 2022-08-04 NOTE — DISCHARGE NOTE NURSING/CASE MANAGEMENT/SOCIAL WORK - NSDCPEFALRISK_GEN_ALL_CORE
For information on Fall & Injury Prevention, visit: [email or web address] OR  [email or web address] OR  https://www.cdc.gov/steadi/patient.html

## 2022-08-04 NOTE — PROGRESS NOTE ADULT - PROBLEM SELECTOR PLAN 1
MRCP performed and reviewed with radiology, no definitive stone seen   Advance diet  Resume Coumadin  May D/C home with outpatient f/u with Dr. Blair on 8/12/2022 at 3:30- discussed with patient   May need EUS as outpatient   Will sign off, please reconsult prn

## 2022-08-04 NOTE — PROGRESS NOTE ADULT - SUBJECTIVE AND OBJECTIVE BOX
INTERVAL HPI/OVERNIGHT EVENTS:  HPI:    88 y/o female admitted with abdominal pain. Patient seen and examined. Denies further symptoms since original episode. No nausea/vomiting.     MEDICATIONS  (STANDING):  dextrose 5% + sodium chloride 0.45%. 1000 milliLiter(s) (75 mL/Hr) IV Continuous <Continuous>  levothyroxine 88 MICROGram(s) Oral daily  metoprolol succinate ER 50 milliGRAM(s) Oral daily  pantoprazole    Tablet 40 milliGRAM(s) Oral before breakfast  piperacillin/tazobactam IVPB.. 3.375 Gram(s) IV Intermittent every 8 hours    MEDICATIONS  (PRN):  acetaminophen     Tablet .. 650 milliGRAM(s) Oral every 6 hours PRN Temp greater or equal to 38C (100.4F), Mild Pain (1 - 3)      Allergies    penicillin (Rash)  sulfa drugs (Rash)    Intolerances        PAST MEDICAL & SURGICAL HISTORY:  S/P AVR      Atrial fibrillation      Hypothyroidism      History of appendectomy      S/P AVR      PHYSICAL EXAM:   Vital Signs:  Vital Signs Last 24 Hrs  T(C): 36.7 (04 Aug 2022 14:00), Max: 36.7 (04 Aug 2022 05:11)  T(F): 98 (04 Aug 2022 14:00), Max: 98.1 (04 Aug 2022 05:11)  HR: 58 (04 Aug 2022 14:00) (54 - 64)  BP: 121/78 (04 Aug 2022 14:00) (120/57 - 165/67)  BP(mean): 78 (04 Aug 2022 05:11) (78 - 78)  RR: 18 (04 Aug 2022 14:00) (17 - 18)  SpO2: 97% (04 Aug 2022 14:00) (96% - 97%)    Parameters below as of 04 Aug 2022 14:00  Patient On (Oxygen Delivery Method): room air      Daily     Daily Weight in k.2 (04 Aug 2022 05:11)I&O's Summary      GENERAL:  Appears stated age,  HEENT:  NC/AT,  conjunctivae clear and pink, sclera -anicteric  CHEST:  Full & symmetric excursion, no increased effort, breath sounds clear  HEART:  Regular rhythm, S1, S2, no murmur  ABDOMEN:  Soft, non-tender, non-distended, normoactive bowel sounds,   EXTEREMITIES:  no edema  SKIN:  No rash/warm/dry  NEURO:  Alert, oriented,       LABS:                        11.7   6.05  )-----------( 191      ( 04 Aug 2022 06:45 )             35.4         138  |  101  |  11  ----------------------------<  91  4.2   |  29  |  1.18    Ca    9.5      04 Aug 2022 06:45  Mg     2.0     -    TPro  7.6  /  Alb  3.7  /  TBili  2.4<H>  /  DBili  0.3  /  AST  58<H>  /  ALT  49<H>  /  AlkPhos  67  08-04    PT/INR - ( 04 Aug 2022 06:45 )   PT: 44.0 sec;   INR: 3.74 ratio         PTT - ( 04 Aug 2022 06:45 )  PTT:42.7 sec  Urinalysis Basic - ( 02 Aug 2022 21:40 )    Color: Yellow / Appearance: Clear / S.010 / pH: x  Gluc: x / Ketone: Negative  / Bili: Negative / Urobili: Negative   Blood: x / Protein: 15 / Nitrite: Negative   Leuk Esterase: Negative / RBC: 11-25 /HPF / WBC Negative /HPF   Sq Epi: x / Non Sq Epi: Neg.-Few / Bacteria: Few /HPF      Lipase, Serum: 167 U/L ( @ 18:10)    RADIOLOGY & ADDITIONAL TESTS:

## 2022-08-04 NOTE — PROGRESS NOTE ADULT - ASSESSMENT
87F hx of [facility] AVR, PAF on coumadin, HLD, hypothyroid pw acute onset on moderate epigastric pain bandlike that wrapped around to her back associated with nausea with acute cholecystitis.     # Acute cholecystitis with mild transaminitis  Appreciate GI/surgery, Cardiology  NPO for MRCP today  Possibel ERCP....according to cardiology no need to reverse INR  Will speak with GI team re: possible ERCP; if needed will give vitamin K vs FFP  Will possibly need heparin drip  cont IV antibiotics, IVF  F/u cultures  trend LFts/lipase    #s/p mech AVR and PAF on coumadin.  supratherapeutic INR> hold coumadin tonight.   Will possibly need heparin drip since holding coumadin  Goal INR 2.5-3.5    #HLD  Will hold lipitor 80mg qhs in view of transaminitis    #Hypothyroid  cont synthroid    DVT PPX: Holding coumadin for supratherapeutic INR  AM labs, Full code    Pt declined by contacting her  or daughter sec to late hr and states they are aware.

## 2022-08-05 PROBLEM — E03.9 HYPOTHYROIDISM, UNSPECIFIED: Chronic | Status: ACTIVE | Noted: 2022-08-02

## 2022-08-05 PROBLEM — Z95.2 PRESENCE OF PROSTHETIC HEART VALVE: Chronic | Status: ACTIVE | Noted: 2022-08-02

## 2022-08-05 PROBLEM — I48.91 UNSPECIFIED ATRIAL FIBRILLATION: Chronic | Status: ACTIVE | Noted: 2022-08-02

## 2022-08-12 ENCOUNTER — APPOINTMENT (OUTPATIENT)
Dept: GASTROENTEROLOGY | Facility: CLINIC | Age: 87
End: 2022-08-12

## 2022-08-12 ENCOUNTER — NON-APPOINTMENT (OUTPATIENT)
Age: 87
End: 2022-08-12

## 2022-08-12 VITALS
HEART RATE: 71 BPM | SYSTOLIC BLOOD PRESSURE: 110 MMHG | BODY MASS INDEX: 22.02 KG/M2 | TEMPERATURE: 94.5 F | OXYGEN SATURATION: 100 % | WEIGHT: 129 LBS | RESPIRATION RATE: 16 BRPM | HEIGHT: 64 IN | DIASTOLIC BLOOD PRESSURE: 68 MMHG

## 2022-08-12 DIAGNOSIS — K59.09 OTHER CONSTIPATION: ICD-10-CM

## 2022-08-12 PROCEDURE — 99204 OFFICE O/P NEW MOD 45 MIN: CPT

## 2022-08-12 PROCEDURE — 99214 OFFICE O/P EST MOD 30 MIN: CPT

## 2022-08-12 NOTE — HISTORY OF PRESENT ILLNESS
[de-identified] : The patient is status post hospitalization for abdominal pain and abnormal liver enzymes with evidence of mild cholecystitis.  There was a question of CBD stone with elevated liver enzymes but a MRCP proved otherwise.  Currently she is feeling quite well.  Her weight is somewhat down but her appetite is fine.  She continues to have longstanding problems with constipation, sometimes moving her bowels every 4 days.  She denies rectal bleeding and last had a colonoscopy nearly 10 years ago without any polyps.  She reports that her father  of colon cancer in his 60s.\par \par No heartburn, no odynophagia, dysphagia or satiety.  She does not exercise regularly.

## 2022-08-12 NOTE — PHYSICAL EXAM
[General Appearance - Alert] : alert [General Appearance - In No Acute Distress] : in no acute distress [Sclera] : the sclera and conjunctiva were normal [PERRL With Normal Accommodation] : pupils were equal in size, round, and reactive to light [Extraocular Movements] : extraocular movements were intact [Outer Ear] : the ears and nose were normal in appearance [Oropharynx] : the oropharynx was normal [Neck Appearance] : the appearance of the neck was normal [Neck Cervical Mass (___cm)] : no neck mass was observed [Jugular Venous Distention Increased] : there was no jugular-venous distention [Thyroid Diffuse Enlargement] : the thyroid was not enlarged [Thyroid Nodule] : there were no palpable thyroid nodules [Auscultation Breath Sounds / Voice Sounds] : lungs were clear to auscultation bilaterally [Heart Rate And Rhythm] : heart rate was normal and rhythm regular [Heart Sounds] : normal S1 and S2 [Heart Sounds Gallop] : no gallops [Murmurs] : no murmurs [Heart Sounds Pericardial Friction Rub] : no pericardial rub [Edema] : there was no peripheral edema [Bowel Sounds] : normal bowel sounds [Abdomen Soft] : soft [Abdomen Tenderness] : non-tender [Abdomen Mass (___ Cm)] : no abdominal mass palpated [Cervical Lymph Nodes Enlarged Posterior Bilaterally] : posterior cervical [Cervical Lymph Nodes Enlarged Anterior Bilaterally] : anterior cervical [No CVA Tenderness] : no ~M costovertebral angle tenderness [No Spinal Tenderness] : no spinal tenderness [Abnormal Walk] : normal gait [Nail Clubbing] : no clubbing  or cyanosis of the fingernails [Musculoskeletal - Swelling] : no joint swelling seen [Motor Tone] : muscle strength and tone were normal [Skin Color & Pigmentation] : normal skin color and pigmentation [Skin Turgor] : normal skin turgor [] : no rash [Oriented To Time, Place, And Person] : oriented to person, place, and time [Impaired Insight] : insight and judgment were intact [Affect] : the affect was normal [Cranial Nerves] : cranial nerves 2-12 were intact

## 2022-08-12 NOTE — ASSESSMENT
[FreeTextEntry1] : My impression is that of a female who had abnormal liver enzymes and abdominal pain while hospitalized but is doing well now.  I have asked her to repeat her liver tests at her next primary care visit.\par \par I have spent a great deal of time discussing the role of daily high-intensity exercise with the patient. We discussed behavior modification strategies to institute this habit.   I have discussed nutrition in great detail including consuming vegetable fibers on a daily basis and limiting simple carbohydrates 5 days per week.  We have also reviewed the benefits of soluble fiber supplementation, including (but not limited to), favorable effects on lipid profile, weight control and the salutary effects on colonic microbiota. We reviewed the effects of such daily habits on metabolism and the metabolic set point resulting in a healthy weight, decreased pain sensitivity (effecting the GI tract), bowel habits and other aspects of health.\par \par

## 2022-08-17 ENCOUNTER — APPOINTMENT (OUTPATIENT)
Dept: SURGERY | Facility: CLINIC | Age: 87
End: 2022-08-17

## 2022-08-24 ENCOUNTER — APPOINTMENT (OUTPATIENT)
Dept: CARDIOLOGY | Facility: CLINIC | Age: 87
End: 2022-08-24

## 2022-08-24 VITALS
BODY MASS INDEX: 22.31 KG/M2 | WEIGHT: 130 LBS | HEART RATE: 63 BPM | OXYGEN SATURATION: 93 % | SYSTOLIC BLOOD PRESSURE: 138 MMHG | DIASTOLIC BLOOD PRESSURE: 62 MMHG

## 2022-08-24 DIAGNOSIS — R74.8 ABNORMAL LEVELS OF OTHER SERUM ENZYMES: ICD-10-CM

## 2022-08-24 PROCEDURE — 99213 OFFICE O/P EST LOW 20 MIN: CPT

## 2022-08-24 RX ORDER — ROSUVASTATIN CALCIUM 20 MG/1
20 TABLET, FILM COATED ORAL
Qty: 30 | Refills: 9 | Status: ACTIVE | COMMUNITY
Start: 2022-08-24

## 2022-08-24 RX ORDER — EZETIMIBE 10 MG/1
10 TABLET ORAL
Qty: 30 | Refills: 9 | Status: ACTIVE | COMMUNITY
Start: 2022-08-24

## 2022-08-24 NOTE — DISCUSSION/SUMMARY
[FreeTextEntry1] : Ms Gillespie is asymptomatic at present after a recent hospitalization with abdominal pain due to cholecystitis.  Her exam shows regular rhythm, normal blood pressure, clear lungs, a closing click of her aortic prosthesis, and trace peripheral edema.\par \par A fingerstick INR was 2.7.\par \par She is stable and is back on her usual medications.  She will continue on Coumadin, aspirin, metoprolol, rosuvastatin 20, Zetia 10.  She will follow-up in 4 months\par \par

## 2022-08-24 NOTE — HISTORY OF PRESENT ILLNESS
[FreeTextEntry1] : 87-year-old female with a history of a [facility] aortic valve replacement, coronary disease, paroxysmal atrial fibrillation, hypercholesterolemia.  She was recently briefly in the hospital with an episode of apparent cholecystitis.  Her liver function tests were elevated and her rosuvastatin was held briefly.  She is now back to normal, taking her usual medications and without further symptoms.

## 2022-08-25 ENCOUNTER — APPOINTMENT (OUTPATIENT)
Dept: ORTHOPEDIC SURGERY | Facility: CLINIC | Age: 87
End: 2022-08-25

## 2022-08-25 VITALS — HEIGHT: 64 IN | BODY MASS INDEX: 23.05 KG/M2 | WEIGHT: 135 LBS

## 2022-08-25 DIAGNOSIS — S93.402A SPRAIN OF UNSPECIFIED LIGAMENT OF LEFT ANKLE, INITIAL ENCOUNTER: ICD-10-CM

## 2022-08-25 LAB — INR PPP: 5.1 RATIO

## 2022-08-25 PROCEDURE — 99213 OFFICE O/P EST LOW 20 MIN: CPT

## 2022-08-25 NOTE — PHYSICAL EXAM
[Slightly Antalgic] : slightly antalgic [Normal RLE] : Right Lower Extremity: No scars, rashes, lesions, ulcers, skin intact [Normal LLE] : Left Lower Extremity: No scars, rashes, lesions, ulcers, skin intact [Normal Touch] : sensation intact for touch [Normal] : Oriented to person, place, and time, insight and judgement were intact and the affect was normal [de-identified] : Left Lower Extremity\par o Foot:\par ¦ Inspection/Palpation : diffuse midfoot tenderness to palpation, moderate diffuse swelling\par ¦ Range of Motion : arc of motion full and painless in all planes\par ¦ Stability : no joint instability on provocative testing\par ¦ Strength : all muscles 5/5\par o Muscle Bulk : no atrophy\par o Sensation : sensation intact to light touch\par o Skin : no skin lesions, no discoloration\par o Vascular Exam : no edema, no cyanosis, radial and ulnar pulses normal \par \par o Ankle :\par ¦ Inspection/Palpation : diffuse lateral tenderness to palpation, most pronounced at ATFL, mild to moderate lateral swelling, no deformities\par ¦ Range of Motion : arc of motion full with minimal pain in all planes\par ¦ Stability : not assessed due to injury\par ¦ Strength : all muscles 5/5\par o Muscle Bulk : no atrophy\par o Sensation : sensation intact to light touch\par o Skin : no skin lesions, no discoloration\par o Vascular Exam : no edema, no cyanosis,  dorsalis pedis artery pulse 2+, posterior tibial artery pulse 2+ \par \par o Tib/Fib:\par ¦ Inspection/Palpation : no tenderness to palpation anteromedial tibia, mild swelling, no deformities [de-identified] : \par ----------------------------------------------------------------------------------------------------------------------------------------------------------------------------------- \par 07/21/2022\par o Left Tib/Fib : AP and lateral views of the tibia/fibula were obtained, there are no soft tissue abnormalities, no fractures, alignment is normal, normal appearing joint spaces, normal bone density, no bony lesions. \par \par o Left ankle : AP, lateral and oblique views were obtained, there are no soft tissue abnormalities, no fractures, alignment is normal, normal appearing joint spaces, normal bone density, no bony lesions, calcification within the distal Achilles tendon\par \par o Left Foot: AP, lateral and oblique views of the foot were obtained, there are no soft tissue abnormalities, no fractures, alignment is normal, normal appearing joint spaces, normal bone density, no bony lesions, calcifications 1st and 4th MTP joints, calcification within the distal Achilles tendon\par \par \par Outside XR of the left ankle from 07/11/2022 reviewed today in office revealed no fractures, calcification within the distal Achilles tendon.

## 2022-08-25 NOTE — DISCUSSION/SUMMARY
[de-identified] : The underlying pathophysiology was reviewed in great detail with the patient as well as the various treatment options, including ice, analgesics, NSAIDs, Physical therapy, immobilization bracing\par \par Activity modifications and restrictions were discussed.\par \par A prescription for Physical Therapy was provided.\par \par A home exercise sheet was given and discussed with the patient to follow. 	\par \par Discussed use of compression wrap. \par \par FU in 4-6 weeks.	 \par \par All questions were answered, all alternatives discussed and the patient is in complete agreement with that plan. Follow-up appointment as instructed. Any issues and the patient will call or come in sooner.

## 2022-08-25 NOTE — HISTORY OF PRESENT ILLNESS
[de-identified] : YVETTE VIVAS is a 87 year old female who presents for follow up of left ankle and lower leg pain. On 07/04/2022, patient reports falling going up her front house steps. She reports there was left ankle pain with associated swelling. The swelling has since decreased, but is still present. She has been doing the home exercises, but is here requesting a physical therapy script. She has discontinued wearing the aircast. She states it was no longer helping her.

## 2022-08-26 ENCOUNTER — APPOINTMENT (OUTPATIENT)
Dept: CARDIOLOGY | Facility: CLINIC | Age: 87
End: 2022-08-26

## 2022-08-26 ENCOUNTER — LABORATORY RESULT (OUTPATIENT)
Age: 87
End: 2022-08-26

## 2022-08-26 VITALS — OXYGEN SATURATION: 96 % | SYSTOLIC BLOOD PRESSURE: 141 MMHG | DIASTOLIC BLOOD PRESSURE: 63 MMHG | HEART RATE: 61 BPM

## 2022-08-26 LAB
INR PPP: 2.3 RATIO
POCT-PROTHROMBIN TIME: 27.5 SECS
QUALITY CONTROL: YES

## 2022-08-26 PROCEDURE — 85610 PROTHROMBIN TIME: CPT | Mod: QW

## 2022-08-26 PROCEDURE — 93793 ANTICOAG MGMT PT WARFARIN: CPT

## 2022-09-07 PROBLEM — Z01.411 ENCOUNTER FOR GYNECOLOGICAL EXAMINATION (GENERAL) (ROUTINE) WITH ABNORMAL FINDINGS: Status: ACTIVE | Noted: 2022-01-01

## 2022-09-07 PROBLEM — N90.5 VULVAR ATROPHY: Status: ACTIVE | Noted: 2022-01-01

## 2022-09-07 PROBLEM — N90.5 VULVAR ATROPHY: Status: RESOLVED | Noted: 2021-05-06 | Resolved: 2022-01-01

## 2022-09-07 PROBLEM — N81.11 CYSTOCELE, MIDLINE: Status: ACTIVE | Noted: 2022-01-01

## 2022-09-07 PROBLEM — N81.4 UTERINE PROLAPSE: Status: ACTIVE | Noted: 2022-01-01

## 2022-09-09 NOTE — MONOCLONAL ANTIBODY INFUSION - ASSESSMENT AND PLAN
ASSESSMENT:  Pt is a 87 years old female with -Covid + 9/9 referred by Dr. Ding .who presents to infusion center for Monoclonal antibody infusion (Bebtelovimab). Patient is vaccinated and boosted with Moderna x 2  Symptoms/ Criteria: headache, cough  Risk Profile includes: mechanical value replacement, vertigo    PLAN:  - infusion procedure explained to patient   - Consent for monoclonal antibody infusion obtained   - Risk & benefits discussed/all questions answered  - Bebtelovimab 175mg IVP over 30 seconds  - observe patient for one hour post infusion and discharge home

## 2022-09-09 NOTE — MONOCLONAL ANTIBODY INFUSION - EXAM
CC: Monoclonal Antibody Infusion/COVID 19 Positive  87yFemale with mechanical value replacement, vertigo    exam/findings:  T(C): --  HR: --  BP: --  RR: --  SpO2: --      PE:   Appearance: NAD	  HEENT:   Normal oral mucosa,   Lymphatic: No lymphadenopathy  Cardiovascular: Normal S1 S2, No JVD, No murmurs, No edema  Respiratory: Lungs clear to auscultation	  Gastrointestinal:  Soft, Non-tender, + BS	  Skin: warm and dry  Neurologic: Non-focal  Extremities: Normal range of motion

## 2022-09-09 NOTE — MONOCLONAL ANTIBODY INFUSION - HOME MEDICATIONS
amoxicillin-clavulanate 875 mg-125 mg oral tablet , 1 milligram(s) orally 2 times a day   levothyroxine 88 mcg (0.088 mg) oral tablet , 1 tab(s) orally once a day  metoprolol succinate 50 mg oral tablet, extended release , 1 tab(s) orally once a day  Coumadin 4 mg oral tablet , 1 tab(s) orally once a day on Mon and Friday only  Coumadin 3 mg oral tablet , 1 tab(s) orally once a day except on MF  Crestor 20 mg oral tablet , 1 tab(s) orally once a day  Zetia 10 mg oral tablet , 1 tab(s) orally once a day  aspirin 81 mg oral tablet , 1 tab(s) orally once a day

## 2022-09-16 PROBLEM — Z23 IMMUNIZATION DUE: Status: ACTIVE | Noted: 2022-01-01

## 2022-09-16 NOTE — COUNSELING
[None] : None [Good understanding] : Patient has a good understanding of lifestyle changes and steps needed to achieve self management goal [de-identified] : Healthy eating and activities

## 2022-09-16 NOTE — HEALTH RISK ASSESSMENT
[Never] : Never [No] : In the past 12 months have you used drugs other than those required for medical reasons? No [No falls in past year] : Patient reported no falls in the past year [0] : 2) Feeling down, depressed, or hopeless: Not at all (0) [Patient reported colonoscopy was normal] : Patient reported colonoscopy was normal [Fully functional (bathing, dressing, toileting, transferring, walking, feeding)] : Fully functional (bathing, dressing, toileting, transferring, walking, feeding) [With Patient/Caregiver] : , with patient/caregiver [Audit-CScore] : 0 [Fully functional (using the telephone, shopping, preparing meals, housekeeping, doing laundry, using] : Fully functional and needs no help or supervision to perform IADLs (using the telephone, shopping, preparing meals, housekeeping, doing laundry, using transportation, managing medications and managing finances) [ColonoscopyDate] : 01/14 [AdvancecareDate] : 09/22

## 2022-09-16 NOTE — HISTORY OF PRESENT ILLNESS
[FreeTextEntry1] : Requests initial visit [de-identified] : Presents for initial visit to establish this office as PCP - states changing her PCP.  Reviewed extensive history and medication.  Following closely with Cardiology; also most recently with GI.  Pt states feeling generally well at present; trying to watch diet.  Reviewed screening and medication - note UTD with flu vaccine but needs pneumonia coverage - agrees to Prevnar 20.

## 2022-09-16 NOTE — ASSESSMENT
[FreeTextEntry1] : Hemodynamically stable with acceptable BP\par Cardiac status stable - rhythm clinically regular and findings otherwise consistent with history\par No clinical evidence of thyroid dysfunction\par Lab profiles drawn in office and sent\par Prevnar 20 given L deltoid

## 2022-09-16 NOTE — PHYSICAL EXAM
[Normal Rate] : normal rate  [Regular Rhythm] : with a regular rhythm [No Murmur] : no murmur heard [No Carotid Bruits] : no carotid bruits [No Abdominal Bruit] : a ~M bruit was not heard ~T in the abdomen [No Varicosities] : no varicosities [No Edema] : there was no peripheral edema [No Palpable Aorta] : no palpable aorta [Normal] : soft, non-tender, non-distended, no masses palpated, no HSM and normal bowel sounds [Normal Posterior Cervical Nodes] : no posterior cervical lymphadenopathy [Normal Anterior Cervical Nodes] : no anterior cervical lymphadenopathy [No Focal Deficits] : no focal deficits [Alert and Oriented x3] : oriented to person, place, and time [de-identified] : metallic heart sounds consistent with history

## 2022-11-22 NOTE — ED PROVIDER NOTE - OBJECTIVE STATEMENT
88 y/o F with h/o AVR on coumadin , presents to ED c/o pain in both wrist and right knee pain s/p trip and fall few hours ago, c/o minor swelling of left wrist, denies hitting her head

## 2022-11-22 NOTE — ED PROVIDER NOTE - NSFOLLOWUPINSTRUCTIONS_ED_ALL_ED_FT
joint pain after fall  apply cold compress,  Take tylenol for pain   follow up with your PMD in 1-2 days  return to ER if any problem

## 2022-11-22 NOTE — ED PROVIDER NOTE - PHYSICAL EXAMINATION
General:     NAD, well-nourished, well-appearing  Head:     NC/AT, EOMI, oral mucosa moist  Neck:     supple  Lungs:     CTA b/l, no w/r/r  CVS:     S1S2, RRR, no m/g/r  Abd:     +BS, s/nt/nd, no organomegaly  Ext:    2+ radial and pedal pulses, no c/c/e  B/L wrist no gross deformity, small effusion on left wrist medial aspect. Good ROM, distal NV intact, right knee no deformity, no edema, gait normal, no skin break, Good ROM   Neuro: grossly intact

## 2022-11-22 NOTE — ED PROVIDER NOTE - PATIENT PORTAL LINK FT
You can access the FollowMyHealth Patient Portal offered by [facility] by registering at the following website: [email or web address]. By joining [employer]’s FollowMyHealth portal, you will also be able to view your health information using other applications (apps) compatible with our system.

## 2022-11-22 NOTE — ED ADULT NURSE NOTE - OBJECTIVE STATEMENT
Pt is alert, came to the ER due to bilateral wrist pain and right knee pain [facility]. Pt is on Coumadin, denies hitting head, or have nausea or vomiting.

## 2022-11-22 NOTE — ED ADULT TRIAGE NOTE - CHIEF COMPLAINT QUOTE
Pt c/o b/l wrist pain and right knee pain s/p fall earlier today. Pt takes coumadin. Denies head injury or LOC.

## 2022-11-22 NOTE — ED PROVIDER NOTE - CLINICAL SUMMARY MEDICAL DECISION MAKING FREE TEXT BOX
pt p/w pain in both wrist, and right knee pain s/p fall, minimal soft tissue swelling on left wrist on exam  otherwise no finding. xrays are normal, pt advised Tylenol, cold compress, and f/u with PMD

## 2022-12-03 NOTE — CHART NOTE - NSCHARTNOTEFT_GEN_A_CORE
SW called pt to discuss and assist with follow up care.  Pt is an 86 y/o female presented to ED for Wrist pain.  As per [facility], pt has scheduled primary care appt with Ej Torres on 12/15/22.

## 2022-12-28 NOTE — HISTORY OF PRESENT ILLNESS
[FreeTextEntry1] : 87-year-old female with a history of St. Rehan's aortic valve replacement, hypercholesterolemia, CAD, paroxysmal atrial fibrillation, chronic anxiety.  She was last seen 4 months ago.  She has been generally well.  She continues to experience episodes of atrial fibrillation at a rate of 1 or 2/month.  She was off her rosuvastatin transiently because of abnormal LFTs associated with cholecystitis, but is back on it and her most recent LDL cholesterol was 93.

## 2022-12-28 NOTE — DISCUSSION/SUMMARY
[FreeTextEntry1] : Ms Gillespie is feeling well and unchanged.  Her exam shows regular rhythm, clear lungs, a closing click of her aortic prosthesis, and no peripheral edema.  Her EKG is within normal limits.\par \par We discussed possibly increasing her metoprolol to 100 mg/day to see if it would further reduce the episodes of atrial fibrillation.  She is reluctant to change.  She is stable and will continue on her current regimen of Coumadin, aspirin, metoprolol 50, Zetia, rosuvastatin 20.  She will follow-up in a year. [EKG obtained to assist in diagnosis and management of assessed problem(s)] : EKG obtained to assist in diagnosis and management of assessed problem(s)

## 2023-01-01 ENCOUNTER — NON-APPOINTMENT (OUTPATIENT)
Age: 88
End: 2023-01-01

## 2023-01-01 ENCOUNTER — APPOINTMENT (OUTPATIENT)
Dept: FAMILY MEDICINE | Facility: CLINIC | Age: 88
End: 2023-01-01
Payer: MEDICARE

## 2023-01-01 ENCOUNTER — LABORATORY RESULT (OUTPATIENT)
Age: 88
End: 2023-01-01

## 2023-01-01 ENCOUNTER — APPOINTMENT (OUTPATIENT)
Dept: ORTHOPEDIC SURGERY | Facility: CLINIC | Age: 88
End: 2023-01-01
Payer: MEDICARE

## 2023-01-01 ENCOUNTER — INPATIENT (INPATIENT)
Facility: HOSPITAL | Age: 88
LOS: 6 days | Discharge: ANOTHER HOSPITAL | DRG: 291 | End: 2023-08-26
Attending: STUDENT IN AN ORGANIZED HEALTH CARE EDUCATION/TRAINING PROGRAM | Admitting: INTERNAL MEDICINE
Payer: MEDICARE

## 2023-01-01 ENCOUNTER — RX RENEWAL (OUTPATIENT)
Age: 88
End: 2023-01-01

## 2023-01-01 ENCOUNTER — INPATIENT (INPATIENT)
Facility: HOSPITAL | Age: 88
LOS: 6 days | DRG: 268 | End: 2023-09-02
Attending: STUDENT IN AN ORGANIZED HEALTH CARE EDUCATION/TRAINING PROGRAM | Admitting: STUDENT IN AN ORGANIZED HEALTH CARE EDUCATION/TRAINING PROGRAM
Payer: MEDICARE

## 2023-01-01 ENCOUNTER — APPOINTMENT (OUTPATIENT)
Dept: CARDIOLOGY | Facility: CLINIC | Age: 88
End: 2023-01-01
Payer: MEDICARE

## 2023-01-01 ENCOUNTER — TRANSCRIPTION ENCOUNTER (OUTPATIENT)
Age: 88
End: 2023-01-01

## 2023-01-01 ENCOUNTER — APPOINTMENT (OUTPATIENT)
Dept: CARDIOLOGY | Facility: CLINIC | Age: 88
End: 2023-01-01

## 2023-01-01 VITALS
TEMPERATURE: 97 F | SYSTOLIC BLOOD PRESSURE: 104 MMHG | DIASTOLIC BLOOD PRESSURE: 62 MMHG | OXYGEN SATURATION: 98 % | HEART RATE: 130 BPM | RESPIRATION RATE: 18 BRPM

## 2023-01-01 VITALS
SYSTOLIC BLOOD PRESSURE: 140 MMHG | OXYGEN SATURATION: 99 % | HEART RATE: 51 BPM | HEIGHT: 62 IN | BODY MASS INDEX: 23.92 KG/M2 | WEIGHT: 130 LBS | DIASTOLIC BLOOD PRESSURE: 60 MMHG

## 2023-01-01 VITALS
HEIGHT: 62 IN | DIASTOLIC BLOOD PRESSURE: 68 MMHG | WEIGHT: 130 LBS | SYSTOLIC BLOOD PRESSURE: 130 MMHG | RESPIRATION RATE: 20 BRPM | HEART RATE: 64 BPM | BODY MASS INDEX: 23.92 KG/M2

## 2023-01-01 VITALS
HEIGHT: 62 IN | DIASTOLIC BLOOD PRESSURE: 68 MMHG | WEIGHT: 130 LBS | BODY MASS INDEX: 23.92 KG/M2 | HEART RATE: 64 BPM | SYSTOLIC BLOOD PRESSURE: 130 MMHG | RESPIRATION RATE: 20 BRPM

## 2023-01-01 VITALS
TEMPERATURE: 98 F | OXYGEN SATURATION: 95 % | RESPIRATION RATE: 18 BRPM | DIASTOLIC BLOOD PRESSURE: 54 MMHG | SYSTOLIC BLOOD PRESSURE: 81 MMHG | HEART RATE: 76 BPM

## 2023-01-01 VITALS
HEART RATE: 116 BPM | SYSTOLIC BLOOD PRESSURE: 110 MMHG | WEIGHT: 125 LBS | DIASTOLIC BLOOD PRESSURE: 72 MMHG | OXYGEN SATURATION: 93 % | TEMPERATURE: 97 F | RESPIRATION RATE: 18 BRPM | HEIGHT: 64 IN

## 2023-01-01 VITALS
OXYGEN SATURATION: 99 % | SYSTOLIC BLOOD PRESSURE: 117 MMHG | RESPIRATION RATE: 18 BRPM | HEART RATE: 89 BPM | DIASTOLIC BLOOD PRESSURE: 65 MMHG | TEMPERATURE: 98 F

## 2023-01-01 VITALS — BODY MASS INDEX: 21.34 KG/M2 | WEIGHT: 125 LBS | HEIGHT: 64 IN

## 2023-01-01 DIAGNOSIS — I25.10 ATHEROSCLEROTIC HEART DISEASE OF NATIVE CORONARY ARTERY W/OUT ANGINA PECTORIS: ICD-10-CM

## 2023-01-01 DIAGNOSIS — I25.10 ATHEROSCLEROTIC HEART DISEASE OF NATIVE CORONARY ARTERY WITHOUT ANGINA PECTORIS: ICD-10-CM

## 2023-01-01 DIAGNOSIS — I48.0 PAROXYSMAL ATRIAL FIBRILLATION: ICD-10-CM

## 2023-01-01 DIAGNOSIS — M19.049 PRIMARY OSTEOARTHRITIS, UNSPECIFIED HAND: ICD-10-CM

## 2023-01-01 DIAGNOSIS — R73.01 IMPAIRED FASTING GLUCOSE: ICD-10-CM

## 2023-01-01 DIAGNOSIS — D64.9 ANEMIA, UNSPECIFIED: ICD-10-CM

## 2023-01-01 DIAGNOSIS — M19.039 PRIMARY OSTEOARTHRITIS, UNSPECIFIED WRIST: ICD-10-CM

## 2023-01-01 DIAGNOSIS — J18.9 PNEUMONIA, UNSPECIFIED ORGANISM: ICD-10-CM

## 2023-01-01 DIAGNOSIS — I10 ESSENTIAL (PRIMARY) HYPERTENSION: ICD-10-CM

## 2023-01-01 DIAGNOSIS — I50.33 ACUTE ON CHRONIC DIASTOLIC (CONGESTIVE) HEART FAILURE: ICD-10-CM

## 2023-01-01 DIAGNOSIS — I50.9 HEART FAILURE, UNSPECIFIED: ICD-10-CM

## 2023-01-01 DIAGNOSIS — N18.30 CHRONIC KIDNEY DISEASE, STAGE 3 UNSPECIFIED: ICD-10-CM

## 2023-01-01 DIAGNOSIS — Z90.49 ACQUIRED ABSENCE OF OTHER SPECIFIED PARTS OF DIGESTIVE TRACT: Chronic | ICD-10-CM

## 2023-01-01 DIAGNOSIS — Z51.5 ENCOUNTER FOR PALLIATIVE CARE: ICD-10-CM

## 2023-01-01 DIAGNOSIS — Z71.89 OTHER SPECIFIED COUNSELING: ICD-10-CM

## 2023-01-01 DIAGNOSIS — E78.00 PURE HYPERCHOLESTEROLEMIA, UNSPECIFIED: ICD-10-CM

## 2023-01-01 DIAGNOSIS — E03.9 HYPOTHYROIDISM, UNSPECIFIED: ICD-10-CM

## 2023-01-01 DIAGNOSIS — R58 HEMORRHAGE, NOT ELSEWHERE CLASSIFIED: ICD-10-CM

## 2023-01-01 DIAGNOSIS — Z95.2 PRESENCE OF PROSTHETIC HEART VALVE: Chronic | ICD-10-CM

## 2023-01-01 DIAGNOSIS — E87.1 HYPO-OSMOLALITY AND HYPONATREMIA: ICD-10-CM

## 2023-01-01 DIAGNOSIS — E44.0 MODERATE PROTEIN-CALORIE MALNUTRITION: ICD-10-CM

## 2023-01-01 DIAGNOSIS — R17 UNSPECIFIED JAUNDICE: ICD-10-CM

## 2023-01-01 DIAGNOSIS — R42 DIZZINESS AND GIDDINESS: ICD-10-CM

## 2023-01-01 DIAGNOSIS — M65.30 TRIGGER FINGER, UNSPECIFIED FINGER: ICD-10-CM

## 2023-01-01 DIAGNOSIS — I48.92 UNSPECIFIED ATRIAL FLUTTER: ICD-10-CM

## 2023-01-01 DIAGNOSIS — I34.0 NONRHEUMATIC MITRAL (VALVE) INSUFFICIENCY: ICD-10-CM

## 2023-01-01 DIAGNOSIS — J90 PLEURAL EFFUSION, NOT ELSEWHERE CLASSIFIED: ICD-10-CM

## 2023-01-01 LAB
24R-OH-CALCIDIOL SERPL-MCNC: 65.7 NG/ML — SIGNIFICANT CHANGE UP (ref 30–80)
ALBUMIN SERPL ELPH-MCNC: 3 G/DL — LOW (ref 3.3–5)
ALBUMIN SERPL ELPH-MCNC: 3.1 G/DL — LOW (ref 3.3–5)
ALBUMIN SERPL ELPH-MCNC: 3.2 G/DL — LOW (ref 3.3–5)
ALBUMIN SERPL ELPH-MCNC: 3.2 G/DL — LOW (ref 3.3–5)
ALBUMIN SERPL ELPH-MCNC: 3.3 G/DL — SIGNIFICANT CHANGE UP (ref 3.3–5)
ALBUMIN SERPL ELPH-MCNC: 3.3 G/DL — SIGNIFICANT CHANGE UP (ref 3.3–5)
ALBUMIN SERPL ELPH-MCNC: 3.6 G/DL — SIGNIFICANT CHANGE UP (ref 3.3–5)
ALBUMIN SERPL ELPH-MCNC: 3.8 G/DL — SIGNIFICANT CHANGE UP (ref 3.3–5)
ALBUMIN SERPL ELPH-MCNC: 3.8 G/DL — SIGNIFICANT CHANGE UP (ref 3.3–5)
ALBUMIN SERPL ELPH-MCNC: 4.6 G/DL
ALBUMIN SERPL ELPH-MCNC: 4.6 G/DL
ALP BLD-CCNC: 88 U/L
ALP BLD-CCNC: 90 U/L
ALP SERPL-CCNC: 62 U/L — SIGNIFICANT CHANGE UP (ref 40–120)
ALP SERPL-CCNC: 64 U/L — SIGNIFICANT CHANGE UP (ref 40–120)
ALP SERPL-CCNC: 66 U/L — SIGNIFICANT CHANGE UP (ref 40–120)
ALP SERPL-CCNC: 70 U/L — SIGNIFICANT CHANGE UP (ref 40–120)
ALP SERPL-CCNC: 72 U/L — SIGNIFICANT CHANGE UP (ref 40–120)
ALP SERPL-CCNC: 75 U/L — SIGNIFICANT CHANGE UP (ref 40–120)
ALP SERPL-CCNC: 75 U/L — SIGNIFICANT CHANGE UP (ref 40–120)
ALP SERPL-CCNC: 78 U/L — SIGNIFICANT CHANGE UP (ref 40–120)
ALP SERPL-CCNC: 79 U/L — SIGNIFICANT CHANGE UP (ref 40–120)
ALP SERPL-CCNC: 82 U/L — SIGNIFICANT CHANGE UP (ref 40–120)
ALP SERPL-CCNC: 82 U/L — SIGNIFICANT CHANGE UP (ref 40–120)
ALT FLD-CCNC: 25 U/L — SIGNIFICANT CHANGE UP (ref 10–45)
ALT FLD-CCNC: 26 U/L — SIGNIFICANT CHANGE UP (ref 10–45)
ALT FLD-CCNC: 29 U/L — SIGNIFICANT CHANGE UP (ref 10–45)
ALT FLD-CCNC: 31 U/L — SIGNIFICANT CHANGE UP (ref 10–45)
ALT FLD-CCNC: 32 U/L — SIGNIFICANT CHANGE UP (ref 10–45)
ALT FLD-CCNC: 33 U/L — SIGNIFICANT CHANGE UP (ref 10–45)
ALT FLD-CCNC: 39 U/L — SIGNIFICANT CHANGE UP (ref 10–45)
ALT FLD-CCNC: 40 U/L — SIGNIFICANT CHANGE UP (ref 10–45)
ALT FLD-CCNC: 41 U/L — SIGNIFICANT CHANGE UP (ref 10–45)
ALT FLD-CCNC: 43 U/L — SIGNIFICANT CHANGE UP (ref 10–45)
ALT FLD-CCNC: 49 U/L — HIGH (ref 10–45)
ALT SERPL-CCNC: 34 U/L
ALT SERPL-CCNC: 37 U/L
ANION GAP SERPL CALC-SCNC: 10 MMOL/L — SIGNIFICANT CHANGE UP (ref 5–17)
ANION GAP SERPL CALC-SCNC: 10 MMOL/L — SIGNIFICANT CHANGE UP (ref 5–17)
ANION GAP SERPL CALC-SCNC: 11 MMOL/L
ANION GAP SERPL CALC-SCNC: 11 MMOL/L
ANION GAP SERPL CALC-SCNC: 11 MMOL/L — SIGNIFICANT CHANGE UP (ref 5–17)
ANION GAP SERPL CALC-SCNC: 12 MMOL/L — SIGNIFICANT CHANGE UP (ref 5–17)
ANION GAP SERPL CALC-SCNC: 13 MMOL/L — SIGNIFICANT CHANGE UP (ref 5–17)
ANION GAP SERPL CALC-SCNC: 14 MMOL/L — SIGNIFICANT CHANGE UP (ref 5–17)
ANION GAP SERPL CALC-SCNC: 14 MMOL/L — SIGNIFICANT CHANGE UP (ref 5–17)
ANION GAP SERPL CALC-SCNC: 15 MMOL/L — SIGNIFICANT CHANGE UP (ref 5–17)
ANION GAP SERPL CALC-SCNC: 15 MMOL/L — SIGNIFICANT CHANGE UP (ref 5–17)
ANION GAP SERPL CALC-SCNC: 16 MMOL/L — SIGNIFICANT CHANGE UP (ref 5–17)
ANION GAP SERPL CALC-SCNC: 5 MMOL/L — SIGNIFICANT CHANGE UP (ref 5–17)
ANION GAP SERPL CALC-SCNC: 6 MMOL/L — SIGNIFICANT CHANGE UP (ref 5–17)
ANION GAP SERPL CALC-SCNC: 7 MMOL/L — SIGNIFICANT CHANGE UP (ref 5–17)
ANION GAP SERPL CALC-SCNC: 8 MMOL/L — SIGNIFICANT CHANGE UP (ref 5–17)
ANION GAP SERPL CALC-SCNC: 9 MMOL/L — SIGNIFICANT CHANGE UP (ref 5–17)
APPEARANCE UR: CLEAR — SIGNIFICANT CHANGE UP
APTT BLD: 104 SEC — SIGNIFICANT CHANGE UP (ref 24.5–35.6)
APTT BLD: 110.8 SEC — HIGH (ref 24.5–35.6)
APTT BLD: 121.2 SEC — CRITICAL HIGH (ref 24.5–35.6)
APTT BLD: 147.5 SEC — CRITICAL HIGH (ref 24.5–35.6)
APTT BLD: 27.5 SEC — SIGNIFICANT CHANGE UP (ref 24.5–35.6)
APTT BLD: 29.4 SEC — SIGNIFICANT CHANGE UP (ref 24.5–35.6)
APTT BLD: 31.8 SEC — SIGNIFICANT CHANGE UP (ref 24.5–35.6)
APTT BLD: 36.5 SEC — HIGH (ref 24.5–35.6)
APTT BLD: 38.9 SEC — HIGH (ref 24.5–35.6)
APTT BLD: 52.7 SEC — HIGH (ref 24.5–35.6)
APTT BLD: 55.3 SEC — HIGH (ref 24.5–35.6)
APTT BLD: 60.4 SEC — HIGH (ref 24.5–35.6)
APTT BLD: 61 SEC — HIGH (ref 24.5–35.6)
APTT BLD: 65.8 SEC — HIGH (ref 24.5–35.6)
APTT BLD: 71.8 SEC — HIGH (ref 24.5–35.6)
APTT BLD: 75.3 SEC — HIGH (ref 24.5–35.6)
APTT BLD: 77.1 SEC — HIGH (ref 24.5–35.6)
APTT BLD: 86.3 SEC — HIGH (ref 24.5–35.6)
APTT BLD: 87.6 SEC — HIGH (ref 24.5–35.6)
APTT BLD: 95.7 SEC — HIGH (ref 24.5–35.6)
APTT BLD: 96.5 SEC — HIGH (ref 24.5–35.6)
APTT BLD: > 200 SEC (ref 24.5–35.6)
AST SERPL-CCNC: 193 U/L — HIGH (ref 10–40)
AST SERPL-CCNC: 30 U/L — SIGNIFICANT CHANGE UP (ref 10–40)
AST SERPL-CCNC: 37 U/L
AST SERPL-CCNC: 38 U/L — SIGNIFICANT CHANGE UP (ref 10–40)
AST SERPL-CCNC: 39 U/L — SIGNIFICANT CHANGE UP (ref 10–40)
AST SERPL-CCNC: 42 U/L — HIGH (ref 10–40)
AST SERPL-CCNC: 42 U/L — HIGH (ref 10–40)
AST SERPL-CCNC: 44 U/L — HIGH (ref 10–40)
AST SERPL-CCNC: 44 U/L — HIGH (ref 10–40)
AST SERPL-CCNC: 48 U/L
AST SERPL-CCNC: 55 U/L — HIGH (ref 10–40)
AST SERPL-CCNC: 58 U/L — HIGH (ref 10–40)
AST SERPL-CCNC: 79 U/L — HIGH (ref 10–40)
AST SERPL-CCNC: 85 U/L — HIGH (ref 10–40)
AST SERPL-CCNC: 94 U/L — HIGH (ref 10–40)
BACTERIA # UR AUTO: NEGATIVE /HPF — SIGNIFICANT CHANGE UP
BASE EXCESS BLDV CALC-SCNC: -2.5 MMOL/L — LOW (ref -2–3)
BASE EXCESS BLDV CALC-SCNC: 9.2 MMOL/L — HIGH (ref -2–3)
BASOPHILS # BLD AUTO: 0.03 K/UL
BASOPHILS # BLD AUTO: 0.05 K/UL — SIGNIFICANT CHANGE UP (ref 0–0.2)
BASOPHILS # BLD AUTO: 0.06 K/UL
BASOPHILS # BLD AUTO: 0.06 K/UL — SIGNIFICANT CHANGE UP (ref 0–0.2)
BASOPHILS NFR BLD AUTO: 0.3 % — SIGNIFICANT CHANGE UP (ref 0–2)
BASOPHILS NFR BLD AUTO: 0.4 %
BASOPHILS NFR BLD AUTO: 0.6 % — SIGNIFICANT CHANGE UP (ref 0–2)
BASOPHILS NFR BLD AUTO: 0.8 %
BILIRUB DIRECT SERPL-MCNC: 0.2 MG/DL — SIGNIFICANT CHANGE UP (ref 0–0.3)
BILIRUB DIRECT SERPL-MCNC: 0.3 MG/DL — SIGNIFICANT CHANGE UP (ref 0–0.3)
BILIRUB DIRECT SERPL-MCNC: 0.4 MG/DL — HIGH (ref 0–0.3)
BILIRUB INDIRECT FLD-MCNC: 0.9 MG/DL — SIGNIFICANT CHANGE UP (ref 0.2–1)
BILIRUB INDIRECT FLD-MCNC: 1.1 MG/DL — HIGH (ref 0.2–1)
BILIRUB SERPL-MCNC: 1.1 MG/DL — SIGNIFICANT CHANGE UP (ref 0.2–1.2)
BILIRUB SERPL-MCNC: 1.2 MG/DL
BILIRUB SERPL-MCNC: 1.2 MG/DL — SIGNIFICANT CHANGE UP (ref 0.2–1.2)
BILIRUB SERPL-MCNC: 1.3 MG/DL — HIGH (ref 0.2–1.2)
BILIRUB SERPL-MCNC: 1.4 MG/DL — HIGH (ref 0.2–1.2)
BILIRUB SERPL-MCNC: 1.5 MG/DL — HIGH (ref 0.2–1.2)
BILIRUB SERPL-MCNC: 1.7 MG/DL — HIGH (ref 0.2–1.2)
BILIRUB SERPL-MCNC: 1.9 MG/DL
BILIRUB SERPL-MCNC: 1.9 MG/DL — HIGH (ref 0.2–1.2)
BILIRUB SERPL-MCNC: 2.1 MG/DL — HIGH (ref 0.2–1.2)
BILIRUB SERPL-MCNC: 2.3 MG/DL — HIGH (ref 0.2–1.2)
BILIRUB SERPL-MCNC: 2.3 MG/DL — HIGH (ref 0.2–1.2)
BILIRUB SERPL-MCNC: 2.5 MG/DL — HIGH (ref 0.2–1.2)
BILIRUB UR-MCNC: NEGATIVE — SIGNIFICANT CHANGE UP
BLD GP AB SCN SERPL QL: NEGATIVE — SIGNIFICANT CHANGE UP
BUN SERPL-MCNC: 15 MG/DL — SIGNIFICANT CHANGE UP (ref 7–23)
BUN SERPL-MCNC: 16 MG/DL — SIGNIFICANT CHANGE UP (ref 7–23)
BUN SERPL-MCNC: 17 MG/DL — SIGNIFICANT CHANGE UP (ref 7–23)
BUN SERPL-MCNC: 17 MG/DL — SIGNIFICANT CHANGE UP (ref 7–23)
BUN SERPL-MCNC: 18 MG/DL
BUN SERPL-MCNC: 18 MG/DL — SIGNIFICANT CHANGE UP (ref 7–23)
BUN SERPL-MCNC: 19 MG/DL — SIGNIFICANT CHANGE UP (ref 7–23)
BUN SERPL-MCNC: 20 MG/DL — SIGNIFICANT CHANGE UP (ref 7–23)
BUN SERPL-MCNC: 22 MG/DL
BUN SERPL-MCNC: 23 MG/DL — SIGNIFICANT CHANGE UP (ref 7–23)
BUN SERPL-MCNC: 24 MG/DL — HIGH (ref 7–23)
BUN SERPL-MCNC: 24 MG/DL — HIGH (ref 7–23)
CA-I SERPL-SCNC: 1.16 MMOL/L — SIGNIFICANT CHANGE UP (ref 1.15–1.33)
CA-I SERPL-SCNC: 1.19 MMOL/L — SIGNIFICANT CHANGE UP (ref 1.15–1.33)
CALCIUM SERPL-MCNC: 10 MG/DL — SIGNIFICANT CHANGE UP (ref 8.4–10.5)
CALCIUM SERPL-MCNC: 10.1 MG/DL — SIGNIFICANT CHANGE UP (ref 8.4–10.5)
CALCIUM SERPL-MCNC: 10.2 MG/DL
CALCIUM SERPL-MCNC: 8.4 MG/DL — SIGNIFICANT CHANGE UP (ref 8.4–10.5)
CALCIUM SERPL-MCNC: 9.1 MG/DL — SIGNIFICANT CHANGE UP (ref 8.4–10.5)
CALCIUM SERPL-MCNC: 9.2 MG/DL — SIGNIFICANT CHANGE UP (ref 8.4–10.5)
CALCIUM SERPL-MCNC: 9.4 MG/DL — SIGNIFICANT CHANGE UP (ref 8.4–10.5)
CALCIUM SERPL-MCNC: 9.5 MG/DL — SIGNIFICANT CHANGE UP (ref 8.4–10.5)
CALCIUM SERPL-MCNC: 9.5 MG/DL — SIGNIFICANT CHANGE UP (ref 8.4–10.5)
CALCIUM SERPL-MCNC: 9.6 MG/DL — SIGNIFICANT CHANGE UP (ref 8.4–10.5)
CALCIUM SERPL-MCNC: 9.7 MG/DL — SIGNIFICANT CHANGE UP (ref 8.4–10.5)
CALCIUM SERPL-MCNC: 9.8 MG/DL — SIGNIFICANT CHANGE UP (ref 8.4–10.5)
CALCIUM SERPL-MCNC: 9.9 MG/DL
CALCIUM SERPL-MCNC: 9.9 MG/DL — SIGNIFICANT CHANGE UP (ref 8.4–10.5)
CHLORIDE BLDV-SCNC: 94 MMOL/L — LOW (ref 96–108)
CHLORIDE BLDV-SCNC: 99 MMOL/L — SIGNIFICANT CHANGE UP (ref 96–108)
CHLORIDE SERPL-SCNC: 100 MMOL/L — SIGNIFICANT CHANGE UP (ref 96–108)
CHLORIDE SERPL-SCNC: 101 MMOL/L
CHLORIDE SERPL-SCNC: 102 MMOL/L
CHLORIDE SERPL-SCNC: 102 MMOL/L — SIGNIFICANT CHANGE UP (ref 96–108)
CHLORIDE SERPL-SCNC: 102 MMOL/L — SIGNIFICANT CHANGE UP (ref 96–108)
CHLORIDE SERPL-SCNC: 89 MMOL/L — LOW (ref 96–108)
CHLORIDE SERPL-SCNC: 90 MMOL/L — LOW (ref 96–108)
CHLORIDE SERPL-SCNC: 90 MMOL/L — LOW (ref 96–108)
CHLORIDE SERPL-SCNC: 91 MMOL/L — LOW (ref 96–108)
CHLORIDE SERPL-SCNC: 91 MMOL/L — LOW (ref 96–108)
CHLORIDE SERPL-SCNC: 92 MMOL/L — LOW (ref 96–108)
CHLORIDE SERPL-SCNC: 93 MMOL/L — LOW (ref 96–108)
CHLORIDE SERPL-SCNC: 95 MMOL/L — LOW (ref 96–108)
CHLORIDE SERPL-SCNC: 95 MMOL/L — LOW (ref 96–108)
CHLORIDE SERPL-SCNC: 96 MMOL/L — SIGNIFICANT CHANGE UP (ref 96–108)
CHLORIDE SERPL-SCNC: 96 MMOL/L — SIGNIFICANT CHANGE UP (ref 96–108)
CHLORIDE SERPL-SCNC: 97 MMOL/L — SIGNIFICANT CHANGE UP (ref 96–108)
CHLORIDE SERPL-SCNC: 98 MMOL/L — SIGNIFICANT CHANGE UP (ref 96–108)
CHLORIDE SERPL-SCNC: 99 MMOL/L — SIGNIFICANT CHANGE UP (ref 96–108)
CHOLEST SERPL-MCNC: 184 MG/DL
CHOLEST SERPL-MCNC: 197 MG/DL
CK MB BLD-MCNC: 1.8 % — SIGNIFICANT CHANGE UP (ref 0–3.5)
CK MB BLD-MCNC: 5.1 % — HIGH (ref 0–3.5)
CK MB BLD-MCNC: 5.2 % — HIGH (ref 0–3.5)
CK MB CFR SERPL CALC: 122.8 NG/ML — HIGH (ref 0–3.8)
CK MB CFR SERPL CALC: 126.7 NG/ML — HIGH (ref 0–3.8)
CK MB CFR SERPL CALC: 34.3 NG/ML — HIGH (ref 0–3.8)
CK SERPL-CCNC: 1269 U/L — HIGH (ref 25–170)
CK SERPL-CCNC: 1905 U/L — HIGH (ref 25–170)
CK SERPL-CCNC: 2352 U/L — HIGH (ref 25–170)
CK SERPL-CCNC: 2476 U/L — HIGH (ref 25–170)
CO2 BLDV-SCNC: 22 MMOL/L — SIGNIFICANT CHANGE UP (ref 22–26)
CO2 BLDV-SCNC: 36 MMOL/L — HIGH (ref 22–26)
CO2 SERPL-SCNC: 18 MMOL/L — LOW (ref 22–31)
CO2 SERPL-SCNC: 21 MMOL/L — LOW (ref 22–31)
CO2 SERPL-SCNC: 22 MMOL/L — SIGNIFICANT CHANGE UP (ref 22–31)
CO2 SERPL-SCNC: 24 MMOL/L — SIGNIFICANT CHANGE UP (ref 22–31)
CO2 SERPL-SCNC: 25 MMOL/L — SIGNIFICANT CHANGE UP (ref 22–31)
CO2 SERPL-SCNC: 26 MMOL/L — SIGNIFICANT CHANGE UP (ref 22–31)
CO2 SERPL-SCNC: 27 MMOL/L
CO2 SERPL-SCNC: 27 MMOL/L — SIGNIFICANT CHANGE UP (ref 22–31)
CO2 SERPL-SCNC: 28 MMOL/L — SIGNIFICANT CHANGE UP (ref 22–31)
CO2 SERPL-SCNC: 29 MMOL/L
CO2 SERPL-SCNC: 29 MMOL/L — SIGNIFICANT CHANGE UP (ref 22–31)
CO2 SERPL-SCNC: 31 MMOL/L — SIGNIFICANT CHANGE UP (ref 22–31)
CO2 SERPL-SCNC: 32 MMOL/L — HIGH (ref 22–31)
CO2 SERPL-SCNC: 32 MMOL/L — HIGH (ref 22–31)
CO2 SERPL-SCNC: 34 MMOL/L — HIGH (ref 22–31)
COLOR SPEC: YELLOW — SIGNIFICANT CHANGE UP
COMMENT - URINE: SIGNIFICANT CHANGE UP
CREAT SERPL-MCNC: 0.81 MG/DL — SIGNIFICANT CHANGE UP (ref 0.5–1.3)
CREAT SERPL-MCNC: 0.84 MG/DL — SIGNIFICANT CHANGE UP (ref 0.5–1.3)
CREAT SERPL-MCNC: 0.86 MG/DL — SIGNIFICANT CHANGE UP (ref 0.5–1.3)
CREAT SERPL-MCNC: 0.87 MG/DL — SIGNIFICANT CHANGE UP (ref 0.5–1.3)
CREAT SERPL-MCNC: 0.87 MG/DL — SIGNIFICANT CHANGE UP (ref 0.5–1.3)
CREAT SERPL-MCNC: 0.88 MG/DL — SIGNIFICANT CHANGE UP (ref 0.5–1.3)
CREAT SERPL-MCNC: 0.9 MG/DL — SIGNIFICANT CHANGE UP (ref 0.5–1.3)
CREAT SERPL-MCNC: 0.9 MG/DL — SIGNIFICANT CHANGE UP (ref 0.5–1.3)
CREAT SERPL-MCNC: 0.93 MG/DL
CREAT SERPL-MCNC: 0.98 MG/DL — SIGNIFICANT CHANGE UP (ref 0.5–1.3)
CREAT SERPL-MCNC: 1 MG/DL — SIGNIFICANT CHANGE UP (ref 0.5–1.3)
CREAT SERPL-MCNC: 1.02 MG/DL — SIGNIFICANT CHANGE UP (ref 0.5–1.3)
CREAT SERPL-MCNC: 1.03 MG/DL — SIGNIFICANT CHANGE UP (ref 0.5–1.3)
CREAT SERPL-MCNC: 1.08 MG/DL — SIGNIFICANT CHANGE UP (ref 0.5–1.3)
CREAT SERPL-MCNC: 1.1 MG/DL
CREAT SERPL-MCNC: 1.12 MG/DL — SIGNIFICANT CHANGE UP (ref 0.5–1.3)
CREAT SERPL-MCNC: 1.16 MG/DL — SIGNIFICANT CHANGE UP (ref 0.5–1.3)
CREAT SERPL-MCNC: 1.16 MG/DL — SIGNIFICANT CHANGE UP (ref 0.5–1.3)
CREAT SERPL-MCNC: 1.28 MG/DL — SIGNIFICANT CHANGE UP (ref 0.5–1.3)
CREAT SERPL-MCNC: 1.28 MG/DL — SIGNIFICANT CHANGE UP (ref 0.5–1.3)
CREAT SERPL-MCNC: 1.32 MG/DL — HIGH (ref 0.5–1.3)
CREAT SERPL-MCNC: 1.36 MG/DL — HIGH (ref 0.5–1.3)
CREAT SERPL-MCNC: 1.39 MG/DL — HIGH (ref 0.5–1.3)
CULTURE RESULTS: SIGNIFICANT CHANGE UP
CULTURE RESULTS: SIGNIFICANT CHANGE UP
DIFF PNL FLD: ABNORMAL
DIGOXIN SERPL-MCNC: 1.2 NG/ML — SIGNIFICANT CHANGE UP (ref 0.8–2)
DIGOXIN SERPL-MCNC: 1.2 NG/ML — SIGNIFICANT CHANGE UP (ref 0.8–2)
DIGOXIN SERPL-MCNC: 4.2 NG/ML — CRITICAL HIGH (ref 0.8–2)
EGFR: 37 ML/MIN/1.73M2 — LOW
EGFR: 38 ML/MIN/1.73M2 — LOW
EGFR: 39 ML/MIN/1.73M2 — LOW
EGFR: 41 ML/MIN/1.73M2 — LOW
EGFR: 41 ML/MIN/1.73M2 — LOW
EGFR: 46 ML/MIN/1.73M2 — LOW
EGFR: 46 ML/MIN/1.73M2 — LOW
EGFR: 48 ML/MIN/1.73M2 — LOW
EGFR: 49 ML/MIN/1.73M2
EGFR: 50 ML/MIN/1.73M2 — LOW
EGFR: 53 ML/MIN/1.73M2 — LOW
EGFR: 54 ML/MIN/1.73M2 — LOW
EGFR: 55 ML/MIN/1.73M2 — LOW
EGFR: 56 ML/MIN/1.73M2 — LOW
EGFR: 60 ML/MIN/1.73M2
EGFR: 62 ML/MIN/1.73M2 — SIGNIFICANT CHANGE UP
EGFR: 62 ML/MIN/1.73M2 — SIGNIFICANT CHANGE UP
EGFR: 64 ML/MIN/1.73M2 — SIGNIFICANT CHANGE UP
EGFR: 65 ML/MIN/1.73M2 — SIGNIFICANT CHANGE UP
EGFR: 65 ML/MIN/1.73M2 — SIGNIFICANT CHANGE UP
EGFR: 66 ML/MIN/1.73M2 — SIGNIFICANT CHANGE UP
EGFR: 68 ML/MIN/1.73M2 — SIGNIFICANT CHANGE UP
EGFR: 71 ML/MIN/1.73M2 — SIGNIFICANT CHANGE UP
EOSINOPHIL # BLD AUTO: 0.06 K/UL — SIGNIFICANT CHANGE UP (ref 0–0.5)
EOSINOPHIL # BLD AUTO: 0.07 K/UL — SIGNIFICANT CHANGE UP (ref 0–0.5)
EOSINOPHIL # BLD AUTO: 0.18 K/UL
EOSINOPHIL # BLD AUTO: 0.21 K/UL
EOSINOPHIL NFR BLD AUTO: 0.4 % — SIGNIFICANT CHANGE UP (ref 0–6)
EOSINOPHIL NFR BLD AUTO: 0.8 % — SIGNIFICANT CHANGE UP (ref 0–6)
EOSINOPHIL NFR BLD AUTO: 2.1 %
EOSINOPHIL NFR BLD AUTO: 2.8 %
EPI CELLS # UR: SIGNIFICANT CHANGE UP
ESTIMATED AVERAGE GLUCOSE: 131 MG/DL
ESTIMATED AVERAGE GLUCOSE: 131 MG/DL
FERRITIN SERPL-MCNC: 211 NG/ML — SIGNIFICANT CHANGE UP (ref 13–330)
GAS PNL BLDV: 128 MMOL/L — LOW (ref 136–145)
GAS PNL BLDV: 131 MMOL/L — LOW (ref 136–145)
GAS PNL BLDV: SIGNIFICANT CHANGE UP
GLUCOSE BLDC GLUCOMTR-MCNC: 131 MG/DL — HIGH (ref 70–99)
GLUCOSE BLDC GLUCOMTR-MCNC: 175 MG/DL — HIGH (ref 70–99)
GLUCOSE BLDV-MCNC: 105 MG/DL — HIGH (ref 70–99)
GLUCOSE BLDV-MCNC: 149 MG/DL — HIGH (ref 70–99)
GLUCOSE SERPL-MCNC: 100 MG/DL — HIGH (ref 70–99)
GLUCOSE SERPL-MCNC: 101 MG/DL — HIGH (ref 70–99)
GLUCOSE SERPL-MCNC: 102 MG/DL — HIGH (ref 70–99)
GLUCOSE SERPL-MCNC: 102 MG/DL — HIGH (ref 70–99)
GLUCOSE SERPL-MCNC: 103 MG/DL — HIGH (ref 70–99)
GLUCOSE SERPL-MCNC: 104 MG/DL — HIGH (ref 70–99)
GLUCOSE SERPL-MCNC: 106 MG/DL — HIGH (ref 70–99)
GLUCOSE SERPL-MCNC: 107 MG/DL — HIGH (ref 70–99)
GLUCOSE SERPL-MCNC: 109 MG/DL — HIGH (ref 70–99)
GLUCOSE SERPL-MCNC: 114 MG/DL — HIGH (ref 70–99)
GLUCOSE SERPL-MCNC: 114 MG/DL — HIGH (ref 70–99)
GLUCOSE SERPL-MCNC: 119 MG/DL — HIGH (ref 70–99)
GLUCOSE SERPL-MCNC: 132 MG/DL — HIGH (ref 70–99)
GLUCOSE SERPL-MCNC: 135 MG/DL — HIGH (ref 70–99)
GLUCOSE SERPL-MCNC: 142 MG/DL — HIGH (ref 70–99)
GLUCOSE SERPL-MCNC: 146 MG/DL — HIGH (ref 70–99)
GLUCOSE SERPL-MCNC: 82 MG/DL
GLUCOSE SERPL-MCNC: 93 MG/DL — SIGNIFICANT CHANGE UP (ref 70–99)
GLUCOSE SERPL-MCNC: 94 MG/DL — SIGNIFICANT CHANGE UP (ref 70–99)
GLUCOSE SERPL-MCNC: 96 MG/DL
GLUCOSE SERPL-MCNC: 96 MG/DL — SIGNIFICANT CHANGE UP (ref 70–99)
GLUCOSE SERPL-MCNC: 98 MG/DL — SIGNIFICANT CHANGE UP (ref 70–99)
GLUCOSE SERPL-MCNC: 99 MG/DL — SIGNIFICANT CHANGE UP (ref 70–99)
GLUCOSE UR QL: NEGATIVE MG/DL — SIGNIFICANT CHANGE UP
HBA1C MFR BLD HPLC: 6.2 %
HBA1C MFR BLD HPLC: 6.2 %
HCO3 BLDV-SCNC: 21 MMOL/L — LOW (ref 22–29)
HCO3 BLDV-SCNC: 34 MMOL/L — HIGH (ref 22–29)
HCT VFR BLD CALC: 22.8 % — LOW (ref 34.5–45)
HCT VFR BLD CALC: 26.3 % — LOW (ref 34.5–45)
HCT VFR BLD CALC: 28.8 % — LOW (ref 34.5–45)
HCT VFR BLD CALC: 29 % — LOW (ref 34.5–45)
HCT VFR BLD CALC: 29.7 % — LOW (ref 34.5–45)
HCT VFR BLD CALC: 29.7 % — LOW (ref 34.5–45)
HCT VFR BLD CALC: 30.7 % — LOW (ref 34.5–45)
HCT VFR BLD CALC: 30.9 % — LOW (ref 34.5–45)
HCT VFR BLD CALC: 31 % — LOW (ref 34.5–45)
HCT VFR BLD CALC: 31 % — LOW (ref 34.5–45)
HCT VFR BLD CALC: 31.3 % — LOW (ref 34.5–45)
HCT VFR BLD CALC: 33 % — LOW (ref 34.5–45)
HCT VFR BLD CALC: 33.8 % — LOW (ref 34.5–45)
HCT VFR BLD CALC: 33.9 % — LOW (ref 34.5–45)
HCT VFR BLD CALC: 35.4 % — SIGNIFICANT CHANGE UP (ref 34.5–45)
HCT VFR BLD CALC: 36.1 % — SIGNIFICANT CHANGE UP (ref 34.5–45)
HCT VFR BLD CALC: 37 % — SIGNIFICANT CHANGE UP (ref 34.5–45)
HCT VFR BLD CALC: 37.1 % — SIGNIFICANT CHANGE UP (ref 34.5–45)
HCT VFR BLD CALC: 37.1 % — SIGNIFICANT CHANGE UP (ref 34.5–45)
HCT VFR BLD CALC: 37.7 % — SIGNIFICANT CHANGE UP (ref 34.5–45)
HCT VFR BLD CALC: 37.8 %
HCT VFR BLD CALC: 37.8 % — SIGNIFICANT CHANGE UP (ref 34.5–45)
HCT VFR BLD CALC: 37.9 %
HCT VFR BLD CALC: 38 % — SIGNIFICANT CHANGE UP (ref 34.5–45)
HCT VFR BLD CALC: 38 % — SIGNIFICANT CHANGE UP (ref 34.5–45)
HCT VFR BLD CALC: 39 % — SIGNIFICANT CHANGE UP (ref 34.5–45)
HCT VFR BLDA CALC: 31 % — LOW (ref 34.5–46.5)
HCT VFR BLDA CALC: 40 % — SIGNIFICANT CHANGE UP (ref 34.5–46.5)
HDLC SERPL-MCNC: 64 MG/DL
HDLC SERPL-MCNC: 69 MG/DL
HGB BLD CALC-MCNC: 10.2 G/DL — LOW (ref 11.7–16.1)
HGB BLD CALC-MCNC: 13.4 G/DL — SIGNIFICANT CHANGE UP (ref 11.7–16.1)
HGB BLD-MCNC: 10 G/DL — LOW (ref 11.5–15.5)
HGB BLD-MCNC: 10.1 G/DL — LOW (ref 11.5–15.5)
HGB BLD-MCNC: 10.2 G/DL — LOW (ref 11.5–15.5)
HGB BLD-MCNC: 10.3 G/DL — LOW (ref 11.5–15.5)
HGB BLD-MCNC: 10.4 G/DL — LOW (ref 11.5–15.5)
HGB BLD-MCNC: 10.4 G/DL — LOW (ref 11.5–15.5)
HGB BLD-MCNC: 10.7 G/DL — LOW (ref 11.5–15.5)
HGB BLD-MCNC: 11 G/DL — LOW (ref 11.5–15.5)
HGB BLD-MCNC: 11.4 G/DL — LOW (ref 11.5–15.5)
HGB BLD-MCNC: 11.5 G/DL — SIGNIFICANT CHANGE UP (ref 11.5–15.5)
HGB BLD-MCNC: 11.7 G/DL — SIGNIFICANT CHANGE UP (ref 11.5–15.5)
HGB BLD-MCNC: 11.9 G/DL
HGB BLD-MCNC: 12 G/DL — SIGNIFICANT CHANGE UP (ref 11.5–15.5)
HGB BLD-MCNC: 12 G/DL — SIGNIFICANT CHANGE UP (ref 11.5–15.5)
HGB BLD-MCNC: 12.1 G/DL
HGB BLD-MCNC: 12.3 G/DL — SIGNIFICANT CHANGE UP (ref 11.5–15.5)
HGB BLD-MCNC: 12.4 G/DL — SIGNIFICANT CHANGE UP (ref 11.5–15.5)
HGB BLD-MCNC: 12.4 G/DL — SIGNIFICANT CHANGE UP (ref 11.5–15.5)
HGB BLD-MCNC: 12.6 G/DL — SIGNIFICANT CHANGE UP (ref 11.5–15.5)
HGB BLD-MCNC: 12.6 G/DL — SIGNIFICANT CHANGE UP (ref 11.5–15.5)
HGB BLD-MCNC: 12.7 G/DL — SIGNIFICANT CHANGE UP (ref 11.5–15.5)
HGB BLD-MCNC: 12.9 G/DL — SIGNIFICANT CHANGE UP (ref 11.5–15.5)
HGB BLD-MCNC: 7.6 G/DL — LOW (ref 11.5–15.5)
HGB BLD-MCNC: 9 G/DL — LOW (ref 11.5–15.5)
HGB BLD-MCNC: 9.8 G/DL — LOW (ref 11.5–15.5)
HGB BLD-MCNC: 9.9 G/DL — LOW (ref 11.5–15.5)
IMM GRANULOCYTES NFR BLD AUTO: 0.3 %
IMM GRANULOCYTES NFR BLD AUTO: 0.4 %
IMM GRANULOCYTES NFR BLD AUTO: 0.4 % — SIGNIFICANT CHANGE UP (ref 0–0.9)
IMM GRANULOCYTES NFR BLD AUTO: 0.8 % — SIGNIFICANT CHANGE UP (ref 0–0.9)
INR BLD: 1.55 RATIO — HIGH (ref 0.85–1.18)
INR BLD: 1.69 RATIO — HIGH (ref 0.85–1.18)
INR BLD: 1.75 RATIO — HIGH (ref 0.85–1.18)
INR BLD: 1.77 RATIO — HIGH (ref 0.85–1.18)
INR BLD: 1.8 RATIO — HIGH (ref 0.85–1.18)
INR BLD: 1.84 RATIO — HIGH (ref 0.85–1.18)
INR BLD: 2.34 RATIO — HIGH (ref 0.85–1.18)
INR BLD: 2.42 RATIO — HIGH (ref 0.85–1.18)
INR BLD: 2.51 RATIO — HIGH (ref 0.85–1.18)
INR BLD: 2.67 RATIO — HIGH (ref 0.85–1.18)
INR BLD: 3.25 RATIO — HIGH (ref 0.85–1.18)
INR BLD: 4.14 RATIO — HIGH (ref 0.85–1.18)
INR BLD: 4.39 RATIO — HIGH (ref 0.85–1.18)
INR PPP: 1.6
INR PPP: 2.5
IRON SATN MFR SERPL: 16 % — SIGNIFICANT CHANGE UP (ref 14–50)
IRON SATN MFR SERPL: 49 UG/DL — SIGNIFICANT CHANGE UP (ref 30–160)
KETONES UR-MCNC: NEGATIVE MG/DL — SIGNIFICANT CHANGE UP
LACTATE BLDV-MCNC: 1.2 MMOL/L — SIGNIFICANT CHANGE UP (ref 0.5–2)
LACTATE BLDV-MCNC: 1.3 MMOL/L — SIGNIFICANT CHANGE UP (ref 0.5–2)
LACTATE BLDV-MCNC: 1.5 MMOL/L — SIGNIFICANT CHANGE UP (ref 0.5–2)
LACTATE SERPL-SCNC: 1.1 MMOL/L — SIGNIFICANT CHANGE UP (ref 0.7–2)
LDH SERPL L TO P-CCNC: 242 U/L — SIGNIFICANT CHANGE UP (ref 50–242)
LDLC SERPL CALC-MCNC: 102 MG/DL
LDLC SERPL CALC-MCNC: 92 MG/DL
LEUKOCYTE ESTERASE UR-ACNC: ABNORMAL
LYMPHOCYTES # BLD AUTO: 1.2 K/UL — SIGNIFICANT CHANGE UP (ref 1–3.3)
LYMPHOCYTES # BLD AUTO: 1.22 K/UL — SIGNIFICANT CHANGE UP (ref 1–3.3)
LYMPHOCYTES # BLD AUTO: 1.6 K/UL
LYMPHOCYTES # BLD AUTO: 1.77 K/UL
LYMPHOCYTES # BLD AUTO: 13.1 % — SIGNIFICANT CHANGE UP (ref 13–44)
LYMPHOCYTES # BLD AUTO: 7.8 % — LOW (ref 13–44)
LYMPHOCYTES NFR BLD AUTO: 18.9 %
LYMPHOCYTES NFR BLD AUTO: 23.8 %
MAGNESIUM SERPL-MCNC: 1.7 MG/DL — SIGNIFICANT CHANGE UP (ref 1.6–2.6)
MAGNESIUM SERPL-MCNC: 1.9 MG/DL — SIGNIFICANT CHANGE UP (ref 1.6–2.6)
MAGNESIUM SERPL-MCNC: 1.9 MG/DL — SIGNIFICANT CHANGE UP (ref 1.6–2.6)
MAGNESIUM SERPL-MCNC: 2 MG/DL — SIGNIFICANT CHANGE UP (ref 1.6–2.6)
MAGNESIUM SERPL-MCNC: 2.1 MG/DL — SIGNIFICANT CHANGE UP (ref 1.6–2.6)
MAGNESIUM SERPL-MCNC: 2.1 MG/DL — SIGNIFICANT CHANGE UP (ref 1.6–2.6)
MAGNESIUM SERPL-MCNC: 2.5 MG/DL — SIGNIFICANT CHANGE UP (ref 1.6–2.6)
MAGNESIUM UR-MCNC: 5.5 MG/DL — SIGNIFICANT CHANGE UP
MAN DIFF?: NORMAL
MAN DIFF?: NORMAL
MCHC RBC-ENTMCNC: 29.1 PG — SIGNIFICANT CHANGE UP (ref 27–34)
MCHC RBC-ENTMCNC: 29.2 PG
MCHC RBC-ENTMCNC: 29.2 PG — SIGNIFICANT CHANGE UP (ref 27–34)
MCHC RBC-ENTMCNC: 29.2 PG — SIGNIFICANT CHANGE UP (ref 27–34)
MCHC RBC-ENTMCNC: 29.3 PG — SIGNIFICANT CHANGE UP (ref 27–34)
MCHC RBC-ENTMCNC: 29.6 PG — SIGNIFICANT CHANGE UP (ref 27–34)
MCHC RBC-ENTMCNC: 29.7 PG — SIGNIFICANT CHANGE UP (ref 27–34)
MCHC RBC-ENTMCNC: 29.9 PG — SIGNIFICANT CHANGE UP (ref 27–34)
MCHC RBC-ENTMCNC: 30.3 PG
MCHC RBC-ENTMCNC: 30.3 PG — SIGNIFICANT CHANGE UP (ref 27–34)
MCHC RBC-ENTMCNC: 30.4 PG — SIGNIFICANT CHANGE UP (ref 27–34)
MCHC RBC-ENTMCNC: 30.4 PG — SIGNIFICANT CHANGE UP (ref 27–34)
MCHC RBC-ENTMCNC: 30.5 PG — SIGNIFICANT CHANGE UP (ref 27–34)
MCHC RBC-ENTMCNC: 30.6 PG — SIGNIFICANT CHANGE UP (ref 27–34)
MCHC RBC-ENTMCNC: 30.6 PG — SIGNIFICANT CHANGE UP (ref 27–34)
MCHC RBC-ENTMCNC: 30.7 PG — SIGNIFICANT CHANGE UP (ref 27–34)
MCHC RBC-ENTMCNC: 30.7 PG — SIGNIFICANT CHANGE UP (ref 27–34)
MCHC RBC-ENTMCNC: 30.8 PG — SIGNIFICANT CHANGE UP (ref 27–34)
MCHC RBC-ENTMCNC: 30.9 PG — SIGNIFICANT CHANGE UP (ref 27–34)
MCHC RBC-ENTMCNC: 31 PG — SIGNIFICANT CHANGE UP (ref 27–34)
MCHC RBC-ENTMCNC: 31.1 PG — SIGNIFICANT CHANGE UP (ref 27–34)
MCHC RBC-ENTMCNC: 31.2 PG — SIGNIFICANT CHANGE UP (ref 27–34)
MCHC RBC-ENTMCNC: 31.4 GM/DL
MCHC RBC-ENTMCNC: 32 GM/DL
MCHC RBC-ENTMCNC: 32.3 GM/DL — SIGNIFICANT CHANGE UP (ref 32–36)
MCHC RBC-ENTMCNC: 32.8 GM/DL — SIGNIFICANT CHANGE UP (ref 32–36)
MCHC RBC-ENTMCNC: 32.9 GM/DL — SIGNIFICANT CHANGE UP (ref 32–36)
MCHC RBC-ENTMCNC: 33.1 GM/DL — SIGNIFICANT CHANGE UP (ref 32–36)
MCHC RBC-ENTMCNC: 33.1 GM/DL — SIGNIFICANT CHANGE UP (ref 32–36)
MCHC RBC-ENTMCNC: 33.2 GM/DL — SIGNIFICANT CHANGE UP (ref 32–36)
MCHC RBC-ENTMCNC: 33.3 GM/DL — SIGNIFICANT CHANGE UP (ref 32–36)
MCHC RBC-ENTMCNC: 33.3 GM/DL — SIGNIFICANT CHANGE UP (ref 32–36)
MCHC RBC-ENTMCNC: 33.4 GM/DL — SIGNIFICANT CHANGE UP (ref 32–36)
MCHC RBC-ENTMCNC: 33.5 GM/DL — SIGNIFICANT CHANGE UP (ref 32–36)
MCHC RBC-ENTMCNC: 33.6 GM/DL — SIGNIFICANT CHANGE UP (ref 32–36)
MCHC RBC-ENTMCNC: 33.7 GM/DL — SIGNIFICANT CHANGE UP (ref 32–36)
MCHC RBC-ENTMCNC: 33.8 GM/DL — SIGNIFICANT CHANGE UP (ref 32–36)
MCHC RBC-ENTMCNC: 34 GM/DL — SIGNIFICANT CHANGE UP (ref 32–36)
MCHC RBC-ENTMCNC: 34 GM/DL — SIGNIFICANT CHANGE UP (ref 32–36)
MCHC RBC-ENTMCNC: 34.2 GM/DL — SIGNIFICANT CHANGE UP (ref 32–36)
MCHC RBC-ENTMCNC: 34.4 GM/DL — SIGNIFICANT CHANGE UP (ref 32–36)
MCHC RBC-ENTMCNC: 34.5 GM/DL — SIGNIFICANT CHANGE UP (ref 32–36)
MCV RBC AUTO: 86.3 FL — SIGNIFICANT CHANGE UP (ref 80–100)
MCV RBC AUTO: 86.8 FL — SIGNIFICANT CHANGE UP (ref 80–100)
MCV RBC AUTO: 87 FL — SIGNIFICANT CHANGE UP (ref 80–100)
MCV RBC AUTO: 87.1 FL — SIGNIFICANT CHANGE UP (ref 80–100)
MCV RBC AUTO: 87.1 FL — SIGNIFICANT CHANGE UP (ref 80–100)
MCV RBC AUTO: 88.2 FL — SIGNIFICANT CHANGE UP (ref 80–100)
MCV RBC AUTO: 88.6 FL — SIGNIFICANT CHANGE UP (ref 80–100)
MCV RBC AUTO: 88.7 FL — SIGNIFICANT CHANGE UP (ref 80–100)
MCV RBC AUTO: 89.2 FL — SIGNIFICANT CHANGE UP (ref 80–100)
MCV RBC AUTO: 89.8 FL — SIGNIFICANT CHANGE UP (ref 80–100)
MCV RBC AUTO: 90.1 FL — SIGNIFICANT CHANGE UP (ref 80–100)
MCV RBC AUTO: 90.2 FL — SIGNIFICANT CHANGE UP (ref 80–100)
MCV RBC AUTO: 90.3 FL — SIGNIFICANT CHANGE UP (ref 80–100)
MCV RBC AUTO: 91.1 FL — SIGNIFICANT CHANGE UP (ref 80–100)
MCV RBC AUTO: 91.6 FL — SIGNIFICANT CHANGE UP (ref 80–100)
MCV RBC AUTO: 91.6 FL — SIGNIFICANT CHANGE UP (ref 80–100)
MCV RBC AUTO: 91.9 FL — SIGNIFICANT CHANGE UP (ref 80–100)
MCV RBC AUTO: 92 FL — SIGNIFICANT CHANGE UP (ref 80–100)
MCV RBC AUTO: 92.5 FL — SIGNIFICANT CHANGE UP (ref 80–100)
MCV RBC AUTO: 92.6 FL — SIGNIFICANT CHANGE UP (ref 80–100)
MCV RBC AUTO: 92.7 FL — SIGNIFICANT CHANGE UP (ref 80–100)
MCV RBC AUTO: 92.8 FL — SIGNIFICANT CHANGE UP (ref 80–100)
MCV RBC AUTO: 93.1 FL
MCV RBC AUTO: 93.6 FL — SIGNIFICANT CHANGE UP (ref 80–100)
MCV RBC AUTO: 94.4 FL — SIGNIFICANT CHANGE UP (ref 80–100)
MCV RBC AUTO: 94.7 FL
MONOCYTES # BLD AUTO: 0.82 K/UL
MONOCYTES # BLD AUTO: 0.88 K/UL — SIGNIFICANT CHANGE UP (ref 0–0.9)
MONOCYTES # BLD AUTO: 1.02 K/UL
MONOCYTES # BLD AUTO: 1.27 K/UL — HIGH (ref 0–0.9)
MONOCYTES NFR BLD AUTO: 11 %
MONOCYTES NFR BLD AUTO: 12 %
MONOCYTES NFR BLD AUTO: 8.3 % — SIGNIFICANT CHANGE UP (ref 2–14)
MONOCYTES NFR BLD AUTO: 9.4 % — SIGNIFICANT CHANGE UP (ref 2–14)
MRSA PCR RESULT.: SIGNIFICANT CHANGE UP
NEUTROPHILS # BLD AUTO: 12.62 K/UL — HIGH (ref 1.8–7.4)
NEUTROPHILS # BLD AUTO: 4.55 K/UL
NEUTROPHILS # BLD AUTO: 5.62 K/UL
NEUTROPHILS # BLD AUTO: 7.05 K/UL — SIGNIFICANT CHANGE UP (ref 1.8–7.4)
NEUTROPHILS NFR BLD AUTO: 61.3 %
NEUTROPHILS NFR BLD AUTO: 66.2 %
NEUTROPHILS NFR BLD AUTO: 75.7 % — SIGNIFICANT CHANGE UP (ref 43–77)
NEUTROPHILS NFR BLD AUTO: 82.4 % — HIGH (ref 43–77)
NITRITE UR-MCNC: NEGATIVE — SIGNIFICANT CHANGE UP
NONHDLC SERPL-MCNC: 120 MG/DL
NONHDLC SERPL-MCNC: 128 MG/DL
NRBC # BLD: 0 /100 WBCS — SIGNIFICANT CHANGE UP (ref 0–0)
NT-PROBNP SERPL-SCNC: 1990 PG/ML — HIGH (ref 0–300)
NT-PROBNP SERPL-SCNC: 4047 PG/ML — HIGH (ref 0–300)
NT-PROBNP SERPL-SCNC: 862 PG/ML — HIGH (ref 0–300)
OSMOLALITY SERPL: 278 MOSMOL/KG — LOW (ref 280–301)
OSMOLALITY UR: 472 MOS/KG — SIGNIFICANT CHANGE UP (ref 300–900)
OSMOLALITY UR: 554 MOS/KG — SIGNIFICANT CHANGE UP (ref 300–900)
PCO2 BLDV: 32 MMHG — LOW (ref 39–42)
PCO2 BLDV: 47 MMHG — HIGH (ref 39–42)
PH BLDV: 7.43 — SIGNIFICANT CHANGE UP (ref 7.32–7.43)
PH BLDV: 7.47 — HIGH (ref 7.32–7.43)
PH UR: 6 — SIGNIFICANT CHANGE UP (ref 5–8)
PHOSPHATE SERPL-MCNC: 3.3 MG/DL — SIGNIFICANT CHANGE UP (ref 2.5–4.5)
PLATELET # BLD AUTO: 190 K/UL — SIGNIFICANT CHANGE UP (ref 150–400)
PLATELET # BLD AUTO: 200 K/UL — SIGNIFICANT CHANGE UP (ref 150–400)
PLATELET # BLD AUTO: 206 K/UL
PLATELET # BLD AUTO: 211 K/UL — SIGNIFICANT CHANGE UP (ref 150–400)
PLATELET # BLD AUTO: 215 K/UL — SIGNIFICANT CHANGE UP (ref 150–400)
PLATELET # BLD AUTO: 218 K/UL — SIGNIFICANT CHANGE UP (ref 150–400)
PLATELET # BLD AUTO: 218 K/UL — SIGNIFICANT CHANGE UP (ref 150–400)
PLATELET # BLD AUTO: 223 K/UL
PLATELET # BLD AUTO: 226 K/UL — SIGNIFICANT CHANGE UP (ref 150–400)
PLATELET # BLD AUTO: 233 K/UL — SIGNIFICANT CHANGE UP (ref 150–400)
PLATELET # BLD AUTO: 234 K/UL — SIGNIFICANT CHANGE UP (ref 150–400)
PLATELET # BLD AUTO: 235 K/UL — SIGNIFICANT CHANGE UP (ref 150–400)
PLATELET # BLD AUTO: 246 K/UL — SIGNIFICANT CHANGE UP (ref 150–400)
PLATELET # BLD AUTO: 253 K/UL — SIGNIFICANT CHANGE UP (ref 150–400)
PLATELET # BLD AUTO: 254 K/UL — SIGNIFICANT CHANGE UP (ref 150–400)
PLATELET # BLD AUTO: 256 K/UL — SIGNIFICANT CHANGE UP (ref 150–400)
PLATELET # BLD AUTO: 260 K/UL — SIGNIFICANT CHANGE UP (ref 150–400)
PLATELET # BLD AUTO: 262 K/UL — SIGNIFICANT CHANGE UP (ref 150–400)
PLATELET # BLD AUTO: 268 K/UL — SIGNIFICANT CHANGE UP (ref 150–400)
PLATELET # BLD AUTO: 287 K/UL — SIGNIFICANT CHANGE UP (ref 150–400)
PLATELET # BLD AUTO: 305 K/UL — SIGNIFICANT CHANGE UP (ref 150–400)
PLATELET # BLD AUTO: 309 K/UL — SIGNIFICANT CHANGE UP (ref 150–400)
PLATELET # BLD AUTO: 318 K/UL — SIGNIFICANT CHANGE UP (ref 150–400)
PLATELET # BLD AUTO: 322 K/UL — SIGNIFICANT CHANGE UP (ref 150–400)
PLATELET # BLD AUTO: 344 K/UL — SIGNIFICANT CHANGE UP (ref 150–400)
PLATELET # BLD AUTO: 358 K/UL — SIGNIFICANT CHANGE UP (ref 150–400)
PO2 BLDV: 49 MMHG — HIGH (ref 25–45)
PO2 BLDV: 65 MMHG — HIGH (ref 25–45)
POTASSIUM BLDV-SCNC: 3.7 MMOL/L — SIGNIFICANT CHANGE UP (ref 3.5–5.1)
POTASSIUM BLDV-SCNC: 4.5 MMOL/L — SIGNIFICANT CHANGE UP (ref 3.5–5.1)
POTASSIUM SERPL-MCNC: 3.3 MMOL/L — LOW (ref 3.5–5.3)
POTASSIUM SERPL-MCNC: 3.7 MMOL/L — SIGNIFICANT CHANGE UP (ref 3.5–5.3)
POTASSIUM SERPL-MCNC: 3.7 MMOL/L — SIGNIFICANT CHANGE UP (ref 3.5–5.3)
POTASSIUM SERPL-MCNC: 3.8 MMOL/L — SIGNIFICANT CHANGE UP (ref 3.5–5.3)
POTASSIUM SERPL-MCNC: 4 MMOL/L — SIGNIFICANT CHANGE UP (ref 3.5–5.3)
POTASSIUM SERPL-MCNC: 4.1 MMOL/L — SIGNIFICANT CHANGE UP (ref 3.5–5.3)
POTASSIUM SERPL-MCNC: 4.2 MMOL/L — SIGNIFICANT CHANGE UP (ref 3.5–5.3)
POTASSIUM SERPL-MCNC: 4.3 MMOL/L — SIGNIFICANT CHANGE UP (ref 3.5–5.3)
POTASSIUM SERPL-MCNC: 4.5 MMOL/L — SIGNIFICANT CHANGE UP (ref 3.5–5.3)
POTASSIUM SERPL-MCNC: 4.6 MMOL/L — SIGNIFICANT CHANGE UP (ref 3.5–5.3)
POTASSIUM SERPL-MCNC: 4.7 MMOL/L — SIGNIFICANT CHANGE UP (ref 3.5–5.3)
POTASSIUM SERPL-SCNC: 3.3 MMOL/L — LOW (ref 3.5–5.3)
POTASSIUM SERPL-SCNC: 3.7 MMOL/L — SIGNIFICANT CHANGE UP (ref 3.5–5.3)
POTASSIUM SERPL-SCNC: 3.7 MMOL/L — SIGNIFICANT CHANGE UP (ref 3.5–5.3)
POTASSIUM SERPL-SCNC: 3.8 MMOL/L — SIGNIFICANT CHANGE UP (ref 3.5–5.3)
POTASSIUM SERPL-SCNC: 4 MMOL/L — SIGNIFICANT CHANGE UP (ref 3.5–5.3)
POTASSIUM SERPL-SCNC: 4.1 MMOL/L — SIGNIFICANT CHANGE UP (ref 3.5–5.3)
POTASSIUM SERPL-SCNC: 4.2 MMOL/L
POTASSIUM SERPL-SCNC: 4.2 MMOL/L — SIGNIFICANT CHANGE UP (ref 3.5–5.3)
POTASSIUM SERPL-SCNC: 4.3 MMOL/L
POTASSIUM SERPL-SCNC: 4.3 MMOL/L — SIGNIFICANT CHANGE UP (ref 3.5–5.3)
POTASSIUM SERPL-SCNC: 4.5 MMOL/L — SIGNIFICANT CHANGE UP (ref 3.5–5.3)
POTASSIUM SERPL-SCNC: 4.6 MMOL/L — SIGNIFICANT CHANGE UP (ref 3.5–5.3)
POTASSIUM SERPL-SCNC: 4.7 MMOL/L — SIGNIFICANT CHANGE UP (ref 3.5–5.3)
PROT SERPL-MCNC: 6.4 G/DL — SIGNIFICANT CHANGE UP (ref 6–8.3)
PROT SERPL-MCNC: 6.7 G/DL — SIGNIFICANT CHANGE UP (ref 6–8.3)
PROT SERPL-MCNC: 6.7 G/DL — SIGNIFICANT CHANGE UP (ref 6–8.3)
PROT SERPL-MCNC: 6.8 G/DL — SIGNIFICANT CHANGE UP (ref 6–8.3)
PROT SERPL-MCNC: 7 G/DL — SIGNIFICANT CHANGE UP (ref 6–8.3)
PROT SERPL-MCNC: 7 G/DL — SIGNIFICANT CHANGE UP (ref 6–8.3)
PROT SERPL-MCNC: 7.1 G/DL — SIGNIFICANT CHANGE UP (ref 6–8.3)
PROT SERPL-MCNC: 7.2 G/DL — SIGNIFICANT CHANGE UP (ref 6–8.3)
PROT SERPL-MCNC: 7.4 G/DL
PROT SERPL-MCNC: 7.5 G/DL
PROT SERPL-MCNC: 7.5 G/DL — SIGNIFICANT CHANGE UP (ref 6–8.3)
PROT SERPL-MCNC: 7.6 G/DL — SIGNIFICANT CHANGE UP (ref 6–8.3)
PROT SERPL-MCNC: 7.6 G/DL — SIGNIFICANT CHANGE UP (ref 6–8.3)
PROT SERPL-MCNC: 7.7 G/DL — SIGNIFICANT CHANGE UP (ref 6–8.3)
PROT SERPL-MCNC: 7.9 G/DL — SIGNIFICANT CHANGE UP (ref 6–8.3)
PROT UR-MCNC: SIGNIFICANT CHANGE UP MG/DL
PROTHROM AB SERPL-ACNC: 16.8 SEC — HIGH (ref 9.5–13)
PROTHROM AB SERPL-ACNC: 19 SEC — HIGH (ref 9.5–13)
PROTHROM AB SERPL-ACNC: 19.5 SEC — HIGH (ref 9.5–13)
PROTHROM AB SERPL-ACNC: 19.6 SEC — HIGH (ref 9.5–13)
PROTHROM AB SERPL-ACNC: 19.8 SEC — HIGH (ref 9.5–13)
PROTHROM AB SERPL-ACNC: 20.7 SEC — HIGH (ref 9.5–13)
PROTHROM AB SERPL-ACNC: 25.1 SEC — HIGH (ref 9.5–13)
PROTHROM AB SERPL-ACNC: 25.7 SEC — HIGH (ref 9.5–13)
PROTHROM AB SERPL-ACNC: 27.6 SEC — HIGH (ref 9.5–13)
PROTHROM AB SERPL-ACNC: 30.4 SEC — HIGH (ref 9.5–13)
PROTHROM AB SERPL-ACNC: 33 SEC — HIGH (ref 9.5–13)
PROTHROM AB SERPL-ACNC: 45.3 SEC — HIGH (ref 9.5–13)
PROTHROM AB SERPL-ACNC: 49.2 SEC — HIGH (ref 9.5–13)
PROTHROMBIN TIME COMMENT: NORMAL
RAPID RVP RESULT: SIGNIFICANT CHANGE UP
RBC # BLD: 2.48 M/UL — LOW (ref 3.8–5.2)
RBC # BLD: 2.93 M/UL — LOW (ref 3.8–5.2)
RBC # BLD: 3.25 M/UL — LOW (ref 3.8–5.2)
RBC # BLD: 3.36 M/UL — LOW (ref 3.8–5.2)
RBC # BLD: 3.41 M/UL — LOW (ref 3.8–5.2)
RBC # BLD: 3.41 M/UL — LOW (ref 3.8–5.2)
RBC # BLD: 3.44 M/UL — LOW (ref 3.8–5.2)
RBC # BLD: 3.48 M/UL — LOW (ref 3.8–5.2)
RBC # BLD: 3.53 M/UL — LOW (ref 3.8–5.2)
RBC # BLD: 3.55 M/UL — LOW (ref 3.8–5.2)
RBC # BLD: 3.55 M/UL — LOW (ref 3.8–5.2)
RBC # BLD: 3.57 M/UL — LOW (ref 3.8–5.2)
RBC # BLD: 3.69 M/UL — LOW (ref 3.8–5.2)
RBC # BLD: 3.7 M/UL — LOW (ref 3.8–5.2)
RBC # BLD: 3.76 M/UL — LOW (ref 3.8–5.2)
RBC # BLD: 3.82 M/UL — SIGNIFICANT CHANGE UP (ref 3.8–5.2)
RBC # BLD: 3.89 M/UL — SIGNIFICANT CHANGE UP (ref 3.8–5.2)
RBC # BLD: 3.93 M/UL — SIGNIFICANT CHANGE UP (ref 3.8–5.2)
RBC # BLD: 3.99 M/UL
RBC # BLD: 4.01 M/UL — SIGNIFICANT CHANGE UP (ref 3.8–5.2)
RBC # BLD: 4.04 M/UL — SIGNIFICANT CHANGE UP (ref 3.8–5.2)
RBC # BLD: 4.06 M/UL — SIGNIFICANT CHANGE UP (ref 3.8–5.2)
RBC # BLD: 4.07 M/UL
RBC # BLD: 4.08 M/UL — SIGNIFICANT CHANGE UP (ref 3.8–5.2)
RBC # BLD: 4.13 M/UL — SIGNIFICANT CHANGE UP (ref 3.8–5.2)
RBC # BLD: 4.14 M/UL — SIGNIFICANT CHANGE UP (ref 3.8–5.2)
RBC # BLD: 4.32 M/UL — SIGNIFICANT CHANGE UP (ref 3.8–5.2)
RBC # FLD: 14.1 % — SIGNIFICANT CHANGE UP (ref 10.3–14.5)
RBC # FLD: 14.2 % — SIGNIFICANT CHANGE UP (ref 10.3–14.5)
RBC # FLD: 14.3 %
RBC # FLD: 14.3 % — SIGNIFICANT CHANGE UP (ref 10.3–14.5)
RBC # FLD: 14.5 % — SIGNIFICANT CHANGE UP (ref 10.3–14.5)
RBC # FLD: 14.5 % — SIGNIFICANT CHANGE UP (ref 10.3–14.5)
RBC # FLD: 14.6 % — HIGH (ref 10.3–14.5)
RBC # FLD: 14.7 % — HIGH (ref 10.3–14.5)
RBC # FLD: 14.8 % — HIGH (ref 10.3–14.5)
RBC # FLD: 14.9 % — HIGH (ref 10.3–14.5)
RBC # FLD: 15 %
RBC # FLD: 15.1 % — HIGH (ref 10.3–14.5)
RBC # FLD: 15.1 % — HIGH (ref 10.3–14.5)
RBC # FLD: 15.3 % — HIGH (ref 10.3–14.5)
RBC # FLD: 16.7 % — HIGH (ref 10.3–14.5)
RBC # FLD: 17.3 % — HIGH (ref 10.3–14.5)
RBC # FLD: 17.4 % — HIGH (ref 10.3–14.5)
RBC # FLD: 17.7 % — HIGH (ref 10.3–14.5)
RBC # FLD: 17.8 % — HIGH (ref 10.3–14.5)
RBC # FLD: 18 % — HIGH (ref 10.3–14.5)
RBC CASTS # UR COMP ASSIST: SIGNIFICANT CHANGE UP /HPF (ref 0–4)
RETICS #: 88 K/UL — SIGNIFICANT CHANGE UP (ref 25–125)
RETICS/RBC NFR: 2.5 % — SIGNIFICANT CHANGE UP (ref 0.5–2.5)
RH IG SCN BLD-IMP: POSITIVE — SIGNIFICANT CHANGE UP
RH IG SCN BLD-IMP: POSITIVE — SIGNIFICANT CHANGE UP
S AUREUS DNA NOSE QL NAA+PROBE: SIGNIFICANT CHANGE UP
SAO2 % BLDV: 81.1 % — SIGNIFICANT CHANGE UP (ref 67–88)
SAO2 % BLDV: 93.8 % — HIGH (ref 67–88)
SARS-COV-2 RNA SPEC QL NAA+PROBE: SIGNIFICANT CHANGE UP
SODIUM SERPL-SCNC: 126 MMOL/L — LOW (ref 135–145)
SODIUM SERPL-SCNC: 126 MMOL/L — LOW (ref 135–145)
SODIUM SERPL-SCNC: 127 MMOL/L — LOW (ref 135–145)
SODIUM SERPL-SCNC: 128 MMOL/L — LOW (ref 135–145)
SODIUM SERPL-SCNC: 128 MMOL/L — LOW (ref 135–145)
SODIUM SERPL-SCNC: 129 MMOL/L — LOW (ref 135–145)
SODIUM SERPL-SCNC: 130 MMOL/L — LOW (ref 135–145)
SODIUM SERPL-SCNC: 131 MMOL/L — LOW (ref 135–145)
SODIUM SERPL-SCNC: 132 MMOL/L — LOW (ref 135–145)
SODIUM SERPL-SCNC: 133 MMOL/L — LOW (ref 135–145)
SODIUM SERPL-SCNC: 134 MMOL/L — LOW (ref 135–145)
SODIUM SERPL-SCNC: 135 MMOL/L — SIGNIFICANT CHANGE UP (ref 135–145)
SODIUM SERPL-SCNC: 135 MMOL/L — SIGNIFICANT CHANGE UP (ref 135–145)
SODIUM SERPL-SCNC: 137 MMOL/L — SIGNIFICANT CHANGE UP (ref 135–145)
SODIUM SERPL-SCNC: 137 MMOL/L — SIGNIFICANT CHANGE UP (ref 135–145)
SODIUM SERPL-SCNC: 138 MMOL/L — SIGNIFICANT CHANGE UP (ref 135–145)
SODIUM SERPL-SCNC: 139 MMOL/L
SODIUM SERPL-SCNC: 139 MMOL/L — SIGNIFICANT CHANGE UP (ref 135–145)
SODIUM SERPL-SCNC: 141 MMOL/L
SODIUM UR-SCNC: 24 MMOL/L — SIGNIFICANT CHANGE UP
SODIUM UR-SCNC: 50 MMOL/L — SIGNIFICANT CHANGE UP
SP GR SPEC: 1.01 — SIGNIFICANT CHANGE UP (ref 1–1.03)
SPECIMEN SOURCE: SIGNIFICANT CHANGE UP
SPECIMEN SOURCE: SIGNIFICANT CHANGE UP
T3 SERPL-MCNC: 57 NG/DL — LOW (ref 80–200)
T4 AB SER-ACNC: 8.8 UG/DL — SIGNIFICANT CHANGE UP (ref 4.6–12)
T4 FREE SERPL-MCNC: 1.3 NG/DL
T4 FREE SERPL-MCNC: 1.4 NG/DL
TIBC SERPL-MCNC: 297 UG/DL — SIGNIFICANT CHANGE UP (ref 220–430)
TRIGL SERPL-MCNC: 130 MG/DL
TRIGL SERPL-MCNC: 140 MG/DL
TROPONIN I, HIGH SENSITIVITY RESULT: 7.1 NG/L — SIGNIFICANT CHANGE UP
TROPONIN I, HIGH SENSITIVITY RESULT: 70.7 NG/L — HIGH
TROPONIN I, HIGH SENSITIVITY RESULT: 83.1 NG/L — HIGH
TROPONIN I, HIGH SENSITIVITY RESULT: 85.1 NG/L — HIGH
TROPONIN I, HIGH SENSITIVITY RESULT: 97.9 NG/L — HIGH
TROPONIN T, HIGH SENSITIVITY RESULT: 232 NG/L — HIGH (ref 0–51)
TROPONIN T, HIGH SENSITIVITY RESULT: 646 NG/L — HIGH (ref 0–51)
TROPONIN T, HIGH SENSITIVITY RESULT: 655 NG/L — HIGH (ref 0–51)
TSH SERPL-ACNC: 2.27 UIU/ML
TSH SERPL-ACNC: 2.52 UIU/ML
TSH SERPL-MCNC: 7.72 UIU/ML — HIGH (ref 0.36–3.74)
TSH SERPL-MCNC: 9.72 UIU/ML — HIGH (ref 0.36–3.74)
UIBC SERPL-MCNC: 248 UG/DL — SIGNIFICANT CHANGE UP (ref 110–370)
UROBILINOGEN FLD QL: 0.2 MG/DL — SIGNIFICANT CHANGE UP (ref 0.2–1)
UUN UR-MCNC: 1332 MG/DL — SIGNIFICANT CHANGE UP
WBC # BLD: 11.63 K/UL — HIGH (ref 3.8–10.5)
WBC # BLD: 11.84 K/UL — HIGH (ref 3.8–10.5)
WBC # BLD: 12.7 K/UL — HIGH (ref 3.8–10.5)
WBC # BLD: 13.01 K/UL — HIGH (ref 3.8–10.5)
WBC # BLD: 13.76 K/UL — HIGH (ref 3.8–10.5)
WBC # BLD: 13.92 K/UL — HIGH (ref 3.8–10.5)
WBC # BLD: 13.94 K/UL — HIGH (ref 3.8–10.5)
WBC # BLD: 14.26 K/UL — HIGH (ref 3.8–10.5)
WBC # BLD: 14.51 K/UL — HIGH (ref 3.8–10.5)
WBC # BLD: 14.73 K/UL — HIGH (ref 3.8–10.5)
WBC # BLD: 15.13 K/UL — HIGH (ref 3.8–10.5)
WBC # BLD: 15.15 K/UL — HIGH (ref 3.8–10.5)
WBC # BLD: 15.33 K/UL — HIGH (ref 3.8–10.5)
WBC # BLD: 8.01 K/UL — SIGNIFICANT CHANGE UP (ref 3.8–10.5)
WBC # BLD: 8.93 K/UL — SIGNIFICANT CHANGE UP (ref 3.8–10.5)
WBC # BLD: 8.98 K/UL — SIGNIFICANT CHANGE UP (ref 3.8–10.5)
WBC # BLD: 9.15 K/UL — SIGNIFICANT CHANGE UP (ref 3.8–10.5)
WBC # BLD: 9.17 K/UL — SIGNIFICANT CHANGE UP (ref 3.8–10.5)
WBC # BLD: 9.27 K/UL — SIGNIFICANT CHANGE UP (ref 3.8–10.5)
WBC # BLD: 9.3 K/UL — SIGNIFICANT CHANGE UP (ref 3.8–10.5)
WBC # BLD: 9.32 K/UL — SIGNIFICANT CHANGE UP (ref 3.8–10.5)
WBC # BLD: 9.34 K/UL — SIGNIFICANT CHANGE UP (ref 3.8–10.5)
WBC # BLD: 9.7 K/UL — SIGNIFICANT CHANGE UP (ref 3.8–10.5)
WBC # BLD: 9.72 K/UL — SIGNIFICANT CHANGE UP (ref 3.8–10.5)
WBC # FLD AUTO: 11.63 K/UL — HIGH (ref 3.8–10.5)
WBC # FLD AUTO: 11.84 K/UL — HIGH (ref 3.8–10.5)
WBC # FLD AUTO: 12.7 K/UL — HIGH (ref 3.8–10.5)
WBC # FLD AUTO: 13.01 K/UL — HIGH (ref 3.8–10.5)
WBC # FLD AUTO: 13.76 K/UL — HIGH (ref 3.8–10.5)
WBC # FLD AUTO: 13.92 K/UL — HIGH (ref 3.8–10.5)
WBC # FLD AUTO: 13.94 K/UL — HIGH (ref 3.8–10.5)
WBC # FLD AUTO: 14.26 K/UL — HIGH (ref 3.8–10.5)
WBC # FLD AUTO: 14.51 K/UL — HIGH (ref 3.8–10.5)
WBC # FLD AUTO: 14.73 K/UL — HIGH (ref 3.8–10.5)
WBC # FLD AUTO: 15.13 K/UL — HIGH (ref 3.8–10.5)
WBC # FLD AUTO: 15.15 K/UL — HIGH (ref 3.8–10.5)
WBC # FLD AUTO: 15.33 K/UL — HIGH (ref 3.8–10.5)
WBC # FLD AUTO: 7.43 K/UL
WBC # FLD AUTO: 8.01 K/UL — SIGNIFICANT CHANGE UP (ref 3.8–10.5)
WBC # FLD AUTO: 8.48 K/UL
WBC # FLD AUTO: 8.93 K/UL — SIGNIFICANT CHANGE UP (ref 3.8–10.5)
WBC # FLD AUTO: 8.98 K/UL — SIGNIFICANT CHANGE UP (ref 3.8–10.5)
WBC # FLD AUTO: 9.15 K/UL — SIGNIFICANT CHANGE UP (ref 3.8–10.5)
WBC # FLD AUTO: 9.17 K/UL — SIGNIFICANT CHANGE UP (ref 3.8–10.5)
WBC # FLD AUTO: 9.27 K/UL — SIGNIFICANT CHANGE UP (ref 3.8–10.5)
WBC # FLD AUTO: 9.3 K/UL — SIGNIFICANT CHANGE UP (ref 3.8–10.5)
WBC # FLD AUTO: 9.32 K/UL — SIGNIFICANT CHANGE UP (ref 3.8–10.5)
WBC # FLD AUTO: 9.34 K/UL — SIGNIFICANT CHANGE UP (ref 3.8–10.5)
WBC # FLD AUTO: 9.7 K/UL — SIGNIFICANT CHANGE UP (ref 3.8–10.5)
WBC # FLD AUTO: 9.72 K/UL — SIGNIFICANT CHANGE UP (ref 3.8–10.5)
WBC UR QL: SIGNIFICANT CHANGE UP /HPF (ref 0–5)

## 2023-01-01 PROCEDURE — 81001 URINALYSIS AUTO W/SCOPE: CPT

## 2023-01-01 PROCEDURE — ZZZZZ: CPT

## 2023-01-01 PROCEDURE — 93306 TTE W/DOPPLER COMPLETE: CPT | Mod: 26

## 2023-01-01 PROCEDURE — 99221 1ST HOSP IP/OBS SF/LOW 40: CPT

## 2023-01-01 PROCEDURE — 71045 X-RAY EXAM CHEST 1 VIEW: CPT

## 2023-01-01 PROCEDURE — 82306 VITAMIN D 25 HYDROXY: CPT

## 2023-01-01 PROCEDURE — 93010 ELECTROCARDIOGRAM REPORT: CPT

## 2023-01-01 PROCEDURE — 84484 ASSAY OF TROPONIN QUANT: CPT

## 2023-01-01 PROCEDURE — 82248 BILIRUBIN DIRECT: CPT

## 2023-01-01 PROCEDURE — 71045 X-RAY EXAM CHEST 1 VIEW: CPT | Mod: 26

## 2023-01-01 PROCEDURE — 84436 ASSAY OF TOTAL THYROXINE: CPT

## 2023-01-01 PROCEDURE — 93000 ELECTROCARDIOGRAM COMPLETE: CPT

## 2023-01-01 PROCEDURE — 99231 SBSQ HOSP IP/OBS SF/LOW 25: CPT

## 2023-01-01 PROCEDURE — 99232 SBSQ HOSP IP/OBS MODERATE 35: CPT

## 2023-01-01 PROCEDURE — 93010 ELECTROCARDIOGRAM REPORT: CPT | Mod: 77

## 2023-01-01 PROCEDURE — 76705 ECHO EXAM OF ABDOMEN: CPT

## 2023-01-01 PROCEDURE — 71046 X-RAY EXAM CHEST 2 VIEWS: CPT | Mod: 26

## 2023-01-01 PROCEDURE — 73130 X-RAY EXAM OF HAND: CPT | Mod: LT

## 2023-01-01 PROCEDURE — 37244 VASC EMBOLIZE/OCCLUDE BLEED: CPT

## 2023-01-01 PROCEDURE — 96375 TX/PRO/DX INJ NEW DRUG ADDON: CPT

## 2023-01-01 PROCEDURE — 99233 SBSQ HOSP IP/OBS HIGH 50: CPT

## 2023-01-01 PROCEDURE — 96365 THER/PROPH/DIAG IV INF INIT: CPT

## 2023-01-01 PROCEDURE — 84480 ASSAY TRIIODOTHYRONINE (T3): CPT

## 2023-01-01 PROCEDURE — 99223 1ST HOSP IP/OBS HIGH 75: CPT

## 2023-01-01 PROCEDURE — 99285 EMERGENCY DEPT VISIT HI MDM: CPT

## 2023-01-01 PROCEDURE — 12345: CPT | Mod: NC

## 2023-01-01 PROCEDURE — 80162 ASSAY OF DIGOXIN TOTAL: CPT

## 2023-01-01 PROCEDURE — 99214 OFFICE O/P EST MOD 30 MIN: CPT | Mod: 25

## 2023-01-01 PROCEDURE — 71250 CT THORAX DX C-: CPT | Mod: 26

## 2023-01-01 PROCEDURE — 99222 1ST HOSP IP/OBS MODERATE 55: CPT | Mod: GC

## 2023-01-01 PROCEDURE — 73522 X-RAY EXAM HIPS BI 3-4 VIEWS: CPT | Mod: 26

## 2023-01-01 PROCEDURE — 83735 ASSAY OF MAGNESIUM: CPT

## 2023-01-01 PROCEDURE — 99222 1ST HOSP IP/OBS MODERATE 55: CPT

## 2023-01-01 PROCEDURE — 0225U NFCT DS DNA&RNA 21 SARSCOV2: CPT

## 2023-01-01 PROCEDURE — 99232 SBSQ HOSP IP/OBS MODERATE 35: CPT | Mod: GC

## 2023-01-01 PROCEDURE — 93306 TTE W/DOPPLER COMPLETE: CPT

## 2023-01-01 PROCEDURE — 36247 INS CATH ABD/L-EXT ART 3RD: CPT | Mod: LT

## 2023-01-01 PROCEDURE — 84443 ASSAY THYROID STIM HORMONE: CPT

## 2023-01-01 PROCEDURE — 71046 X-RAY EXAM CHEST 2 VIEWS: CPT

## 2023-01-01 PROCEDURE — 97162 PT EVAL MOD COMPLEX 30 MIN: CPT

## 2023-01-01 PROCEDURE — 93005 ELECTROCARDIOGRAM TRACING: CPT

## 2023-01-01 PROCEDURE — 74176 CT ABD & PELVIS W/O CONTRAST: CPT | Mod: 26

## 2023-01-01 PROCEDURE — 74176 CT ABD & PELVIS W/O CONTRAST: CPT | Mod: 26,59

## 2023-01-01 PROCEDURE — 93970 EXTREMITY STUDY: CPT

## 2023-01-01 PROCEDURE — 85027 COMPLETE CBC AUTOMATED: CPT

## 2023-01-01 PROCEDURE — 99497 ADVNCD CARE PLAN 30 MIN: CPT | Mod: 25

## 2023-01-01 PROCEDURE — 80048 BASIC METABOLIC PNL TOTAL CA: CPT

## 2023-01-01 PROCEDURE — 99214 OFFICE O/P EST MOD 30 MIN: CPT

## 2023-01-01 PROCEDURE — 99223 1ST HOSP IP/OBS HIGH 75: CPT | Mod: GC

## 2023-01-01 PROCEDURE — 85730 THROMBOPLASTIN TIME PARTIAL: CPT

## 2023-01-01 PROCEDURE — 36415 COLL VENOUS BLD VENIPUNCTURE: CPT

## 2023-01-01 PROCEDURE — 99232 SBSQ HOSP IP/OBS MODERATE 35: CPT | Mod: FS

## 2023-01-01 PROCEDURE — 99213 OFFICE O/P EST LOW 20 MIN: CPT

## 2023-01-01 PROCEDURE — 83880 ASSAY OF NATRIURETIC PEPTIDE: CPT

## 2023-01-01 PROCEDURE — 93970 EXTREMITY STUDY: CPT | Mod: 26

## 2023-01-01 PROCEDURE — 20610 DRAIN/INJ JOINT/BURSA W/O US: CPT | Mod: LT

## 2023-01-01 PROCEDURE — 76705 ECHO EXAM OF ABDOMEN: CPT | Mod: 26

## 2023-01-01 PROCEDURE — 80053 COMPREHEN METABOLIC PANEL: CPT

## 2023-01-01 PROCEDURE — 85025 COMPLETE CBC W/AUTO DIFF WBC: CPT

## 2023-01-01 PROCEDURE — 93308 TTE F-UP OR LMTD: CPT | Mod: 26

## 2023-01-01 PROCEDURE — 99213 OFFICE O/P EST LOW 20 MIN: CPT | Mod: 25

## 2023-01-01 PROCEDURE — 76604 US EXAM CHEST: CPT | Mod: 26

## 2023-01-01 PROCEDURE — 87040 BLOOD CULTURE FOR BACTERIA: CPT

## 2023-01-01 PROCEDURE — 85610 PROTHROMBIN TIME: CPT

## 2023-01-01 PROCEDURE — 20550 NJX 1 TENDON SHEATH/LIGAMENT: CPT | Mod: RT

## 2023-01-01 PROCEDURE — 71250 CT THORAX DX C-: CPT

## 2023-01-01 PROCEDURE — 36245 INS CATH ABD/L-EXT ART 1ST: CPT | Mod: RT,XS

## 2023-01-01 PROCEDURE — 83605 ASSAY OF LACTIC ACID: CPT

## 2023-01-01 PROCEDURE — 74177 CT ABD & PELVIS W/CONTRAST: CPT | Mod: 26

## 2023-01-01 RX ORDER — EZETIMIBE 10 MG/1
1 TABLET ORAL
Qty: 0 | Refills: 0 | DISCHARGE

## 2023-01-01 RX ORDER — SODIUM CHLORIDE 0.65 %
1 AEROSOL, SPRAY (ML) NASAL THREE TIMES A DAY
Refills: 0 | Status: DISCONTINUED | OUTPATIENT
Start: 2023-01-01 | End: 2023-01-01

## 2023-01-01 RX ORDER — MENTHOL AND METHYL SALICYLATE 10; 30 G/100G; G/100G
1 CREAM TOPICAL THREE TIMES A DAY
Refills: 0 | Status: DISCONTINUED | OUTPATIENT
Start: 2023-01-01 | End: 2023-01-01

## 2023-01-01 RX ORDER — METOPROLOL TARTRATE 50 MG
25 TABLET ORAL DAILY
Refills: 0 | Status: DISCONTINUED | OUTPATIENT
Start: 2023-01-01 | End: 2023-01-01

## 2023-01-01 RX ORDER — SODIUM CHLORIDE 9 MG/ML
500 INJECTION INTRAMUSCULAR; INTRAVENOUS; SUBCUTANEOUS ONCE
Refills: 0 | Status: COMPLETED | OUTPATIENT
Start: 2023-01-01 | End: 2023-01-01

## 2023-01-01 RX ORDER — MORPHINE SULFATE 50 MG/1
2 CAPSULE, EXTENDED RELEASE ORAL EVERY 6 HOURS
Refills: 0 | Status: DISCONTINUED | OUTPATIENT
Start: 2023-01-01 | End: 2023-01-01

## 2023-01-01 RX ORDER — DIGOXIN 250 MCG
250 TABLET ORAL EVERY 6 HOURS
Refills: 0 | Status: COMPLETED | OUTPATIENT
Start: 2023-01-01 | End: 2023-01-01

## 2023-01-01 RX ORDER — PETROLATUM,WHITE
1 JELLY (GRAM) TOPICAL
Refills: 0 | Status: COMPLETED | OUTPATIENT
Start: 2023-01-01 | End: 2023-01-01

## 2023-01-01 RX ORDER — WARFARIN SODIUM 2.5 MG/1
3 TABLET ORAL ONCE
Refills: 0 | Status: COMPLETED | OUTPATIENT
Start: 2023-01-01 | End: 2023-01-01

## 2023-01-01 RX ORDER — METOPROLOL TARTRATE 50 MG
25 TABLET ORAL ONCE
Refills: 0 | Status: COMPLETED | OUTPATIENT
Start: 2023-01-01 | End: 2023-01-01

## 2023-01-01 RX ORDER — HEPARIN SODIUM 5000 [USP'U]/ML
4500 INJECTION INTRAVENOUS; SUBCUTANEOUS EVERY 6 HOURS
Refills: 0 | Status: DISCONTINUED | OUTPATIENT
Start: 2023-01-01 | End: 2023-01-01

## 2023-01-01 RX ORDER — HEPARIN SODIUM 5000 [USP'U]/ML
600 INJECTION INTRAVENOUS; SUBCUTANEOUS
Qty: 25000 | Refills: 0 | Status: DISCONTINUED | OUTPATIENT
Start: 2023-01-01 | End: 2023-01-01

## 2023-01-01 RX ORDER — SENNA PLUS 8.6 MG/1
2 TABLET ORAL AT BEDTIME
Refills: 0 | Status: DISCONTINUED | OUTPATIENT
Start: 2023-01-01 | End: 2023-01-01

## 2023-01-01 RX ORDER — FUROSEMIDE 40 MG
40 TABLET ORAL DAILY
Refills: 0 | Status: DISCONTINUED | OUTPATIENT
Start: 2023-01-01 | End: 2023-01-01

## 2023-01-01 RX ORDER — ACETAMINOPHEN 500 MG
1000 TABLET ORAL ONCE
Refills: 0 | Status: COMPLETED | OUTPATIENT
Start: 2023-01-01 | End: 2023-01-01

## 2023-01-01 RX ORDER — HEPARIN SODIUM 5000 [USP'U]/ML
2000 INJECTION INTRAVENOUS; SUBCUTANEOUS EVERY 6 HOURS
Refills: 0 | Status: DISCONTINUED | OUTPATIENT
Start: 2023-01-01 | End: 2023-01-01

## 2023-01-01 RX ORDER — ASPIRIN/CALCIUM CARB/MAGNESIUM 324 MG
81 TABLET ORAL DAILY
Refills: 0 | Status: DISCONTINUED | OUTPATIENT
Start: 2023-01-01 | End: 2023-01-01

## 2023-01-01 RX ORDER — HYDROMORPHONE HYDROCHLORIDE 2 MG/ML
0.25 INJECTION INTRAMUSCULAR; INTRAVENOUS; SUBCUTANEOUS
Refills: 0 | Status: DISCONTINUED | OUTPATIENT
Start: 2023-01-01 | End: 2023-01-01

## 2023-01-01 RX ORDER — SODIUM CHLORIDE 9 MG/ML
2 INJECTION INTRAMUSCULAR; INTRAVENOUS; SUBCUTANEOUS
Refills: 0 | Status: DISCONTINUED | OUTPATIENT
Start: 2023-01-01 | End: 2023-01-01

## 2023-01-01 RX ORDER — LIDOCAINE 4 G/100G
1 CREAM TOPICAL DAILY
Refills: 0 | Status: DISCONTINUED | OUTPATIENT
Start: 2023-01-01 | End: 2023-01-01

## 2023-01-01 RX ORDER — MECLIZINE HCL 12.5 MG
1 TABLET ORAL
Refills: 0 | DISCHARGE

## 2023-01-01 RX ORDER — FUROSEMIDE 40 MG
40 TABLET ORAL
Refills: 0 | Status: DISCONTINUED | OUTPATIENT
Start: 2023-01-01 | End: 2023-01-01

## 2023-01-01 RX ORDER — ATORVASTATIN CALCIUM 80 MG/1
1 TABLET, FILM COATED ORAL
Qty: 0 | Refills: 0 | DISCHARGE
Start: 2023-01-01

## 2023-01-01 RX ORDER — SODIUM CHLORIDE 0.65 %
2 AEROSOL, SPRAY (ML) NASAL ONCE
Refills: 0 | Status: COMPLETED | OUTPATIENT
Start: 2023-01-01 | End: 2023-01-01

## 2023-01-01 RX ORDER — FOLIC ACID 0.8 MG
1 TABLET ORAL
Refills: 0 | DISCHARGE

## 2023-01-01 RX ORDER — WARFARIN SODIUM 2.5 MG/1
5 TABLET ORAL ONCE
Refills: 0 | Status: COMPLETED | OUTPATIENT
Start: 2023-01-01 | End: 2023-01-01

## 2023-01-01 RX ORDER — ONDANSETRON 8 MG/1
4 TABLET, FILM COATED ORAL EVERY 8 HOURS
Refills: 0 | Status: DISCONTINUED | OUTPATIENT
Start: 2023-01-01 | End: 2023-01-01

## 2023-01-01 RX ORDER — MECLIZINE HCL 12.5 MG
12.5 TABLET ORAL EVERY 8 HOURS
Refills: 0 | Status: DISCONTINUED | OUTPATIENT
Start: 2023-01-01 | End: 2023-01-01

## 2023-01-01 RX ORDER — POTASSIUM CHLORIDE 20 MEQ
20 PACKET (EA) ORAL ONCE
Refills: 0 | Status: COMPLETED | OUTPATIENT
Start: 2023-01-01 | End: 2023-01-01

## 2023-01-01 RX ORDER — METOPROLOL TARTRATE 50 MG
50 TABLET ORAL AT BEDTIME
Refills: 0 | Status: DISCONTINUED | OUTPATIENT
Start: 2023-01-01 | End: 2023-01-01

## 2023-01-01 RX ORDER — WARFARIN SODIUM 2.5 MG/1
1 TABLET ORAL
Qty: 0 | Refills: 0 | DISCHARGE

## 2023-01-01 RX ORDER — ACETAMINOPHEN 500 MG
650 TABLET ORAL EVERY 6 HOURS
Refills: 0 | Status: DISCONTINUED | OUTPATIENT
Start: 2023-01-01 | End: 2023-01-01

## 2023-01-01 RX ORDER — METOPROLOL TARTRATE 50 MG
25 TABLET ORAL
Refills: 0 | Status: DISCONTINUED | OUTPATIENT
Start: 2023-01-01 | End: 2023-01-01

## 2023-01-01 RX ORDER — FUROSEMIDE 40 MG
20 TABLET ORAL ONCE
Refills: 0 | Status: COMPLETED | OUTPATIENT
Start: 2023-01-01 | End: 2023-01-01

## 2023-01-01 RX ORDER — FUROSEMIDE 40 MG
20 TABLET ORAL ONCE
Refills: 0 | Status: DISCONTINUED | OUTPATIENT
Start: 2023-01-01 | End: 2023-01-01

## 2023-01-01 RX ORDER — FUROSEMIDE 40 MG
40 TABLET ORAL ONCE
Refills: 0 | Status: COMPLETED | OUTPATIENT
Start: 2023-01-01 | End: 2023-01-01

## 2023-01-01 RX ORDER — HEPARIN SODIUM 5000 [USP'U]/ML
6 INJECTION INTRAVENOUS; SUBCUTANEOUS
Qty: 0 | Refills: 0 | DISCHARGE
Start: 2023-01-01

## 2023-01-01 RX ORDER — POLYETHYLENE GLYCOL 3350 17 G/17G
17 POWDER, FOR SOLUTION ORAL DAILY
Refills: 0 | Status: DISCONTINUED | OUTPATIENT
Start: 2023-01-01 | End: 2023-01-01

## 2023-01-01 RX ORDER — POLYETHYLENE GLYCOL 3350 17 G/17G
17 POWDER, FOR SOLUTION ORAL
Refills: 0 | Status: DISCONTINUED | OUTPATIENT
Start: 2023-01-01 | End: 2023-01-01

## 2023-01-01 RX ORDER — DIGOXIN 250 MCG
250 TABLET ORAL EVERY 6 HOURS
Refills: 0 | Status: DISCONTINUED | OUTPATIENT
Start: 2023-01-01 | End: 2023-01-01

## 2023-01-01 RX ORDER — LEVOTHYROXINE SODIUM 125 MCG
88 TABLET ORAL DAILY
Refills: 0 | Status: DISCONTINUED | OUTPATIENT
Start: 2023-01-01 | End: 2023-01-01

## 2023-01-01 RX ORDER — WARFARIN SODIUM 2.5 MG/1
1 TABLET ORAL
Refills: 0 | DISCHARGE

## 2023-01-01 RX ORDER — TRAMADOL HYDROCHLORIDE 50 MG/1
25 TABLET ORAL EVERY 8 HOURS
Refills: 0 | Status: DISCONTINUED | OUTPATIENT
Start: 2023-01-01 | End: 2023-01-01

## 2023-01-01 RX ORDER — ROBINUL 0.2 MG/ML
0.4 INJECTION INTRAMUSCULAR; INTRAVENOUS EVERY 6 HOURS
Refills: 0 | Status: DISCONTINUED | OUTPATIENT
Start: 2023-01-01 | End: 2023-01-01

## 2023-01-01 RX ORDER — ATORVASTATIN CALCIUM 80 MG/1
20 TABLET, FILM COATED ORAL AT BEDTIME
Refills: 0 | Status: DISCONTINUED | OUTPATIENT
Start: 2023-01-01 | End: 2023-01-01

## 2023-01-01 RX ORDER — CYCLOBENZAPRINE HYDROCHLORIDE 10 MG/1
5 TABLET, FILM COATED ORAL ONCE
Refills: 0 | Status: COMPLETED | OUTPATIENT
Start: 2023-01-01 | End: 2023-01-01

## 2023-01-01 RX ORDER — ROSUVASTATIN CALCIUM 5 MG/1
1 TABLET ORAL
Refills: 0 | DISCHARGE

## 2023-01-01 RX ORDER — MENTHOL AND METHYL SALICYLATE 10; 30 G/100G; G/100G
1 CREAM TOPICAL
Refills: 0 | Status: DISCONTINUED | OUTPATIENT
Start: 2023-01-01 | End: 2023-01-01

## 2023-01-01 RX ORDER — ROSUVASTATIN CALCIUM 5 MG/1
1 TABLET ORAL
Qty: 0 | Refills: 0 | DISCHARGE

## 2023-01-01 RX ORDER — LANOLIN ALCOHOL/MO/W.PET/CERES
3 CREAM (GRAM) TOPICAL AT BEDTIME
Refills: 0 | Status: DISCONTINUED | OUTPATIENT
Start: 2023-01-01 | End: 2023-01-01

## 2023-01-01 RX ORDER — METOPROLOL TARTRATE 50 MG
50 TABLET ORAL
Refills: 0 | Status: DISCONTINUED | OUTPATIENT
Start: 2023-01-01 | End: 2023-01-01

## 2023-01-01 RX ORDER — HEPARIN SODIUM 5000 [USP'U]/ML
INJECTION INTRAVENOUS; SUBCUTANEOUS
Qty: 25000 | Refills: 0 | Status: DISCONTINUED | OUTPATIENT
Start: 2023-01-01 | End: 2023-01-01

## 2023-01-01 RX ORDER — DIGOXIN 250 MCG
1 TABLET ORAL
Qty: 0 | Refills: 0 | DISCHARGE
Start: 2023-01-01

## 2023-01-01 RX ORDER — EZETIMIBE 10 MG/1
10 TABLET ORAL AT BEDTIME
Refills: 0 | Status: DISCONTINUED | OUTPATIENT
Start: 2023-01-01 | End: 2023-01-01

## 2023-01-01 RX ORDER — SODIUM CHLORIDE 9 MG/ML
2 INJECTION INTRAMUSCULAR; INTRAVENOUS; SUBCUTANEOUS THREE TIMES A DAY
Refills: 0 | Status: DISCONTINUED | OUTPATIENT
Start: 2023-01-01 | End: 2023-01-01

## 2023-01-01 RX ORDER — HEPARIN SODIUM 5000 [USP'U]/ML
4500 INJECTION INTRAVENOUS; SUBCUTANEOUS ONCE
Refills: 0 | Status: COMPLETED | OUTPATIENT
Start: 2023-01-01 | End: 2023-01-01

## 2023-01-01 RX ORDER — NOREPINEPHRINE BITARTRATE/D5W 8 MG/250ML
0.05 PLASTIC BAG, INJECTION (ML) INTRAVENOUS
Qty: 8 | Refills: 0 | Status: DISCONTINUED | OUTPATIENT
Start: 2023-01-01 | End: 2023-01-01

## 2023-01-01 RX ORDER — CYCLOBENZAPRINE HYDROCHLORIDE 10 MG/1
5 TABLET, FILM COATED ORAL THREE TIMES A DAY
Refills: 0 | Status: DISCONTINUED | OUTPATIENT
Start: 2023-01-01 | End: 2023-01-01

## 2023-01-01 RX ORDER — HYDROMORPHONE HYDROCHLORIDE 2 MG/ML
1 INJECTION INTRAMUSCULAR; INTRAVENOUS; SUBCUTANEOUS
Refills: 0 | Status: DISCONTINUED | OUTPATIENT
Start: 2023-01-01 | End: 2023-01-01

## 2023-01-01 RX ORDER — FUROSEMIDE 40 MG
40 TABLET ORAL
Qty: 0 | Refills: 0 | DISCHARGE
Start: 2023-01-01

## 2023-01-01 RX ORDER — CYCLOBENZAPRINE HYDROCHLORIDE 10 MG/1
5 TABLET, FILM COATED ORAL AT BEDTIME
Refills: 0 | Status: DISCONTINUED | OUTPATIENT
Start: 2023-01-01 | End: 2023-01-01

## 2023-01-01 RX ORDER — DIGOXIN 250 MCG
62.5 TABLET ORAL DAILY
Refills: 0 | Status: DISCONTINUED | OUTPATIENT
Start: 2023-01-01 | End: 2023-01-01

## 2023-01-01 RX ORDER — ASPIRIN/CALCIUM CARB/MAGNESIUM 324 MG
1 TABLET ORAL
Qty: 0 | Refills: 0 | DISCHARGE

## 2023-01-01 RX ORDER — POTASSIUM CHLORIDE 20 MEQ
40 PACKET (EA) ORAL EVERY 4 HOURS
Refills: 0 | Status: COMPLETED | OUTPATIENT
Start: 2023-01-01 | End: 2023-01-01

## 2023-01-01 RX ORDER — DIGOXIN 250 MCG
500 TABLET ORAL ONCE
Refills: 0 | Status: COMPLETED | OUTPATIENT
Start: 2023-01-01 | End: 2023-01-01

## 2023-01-01 RX ORDER — ASPIRIN/CALCIUM CARB/MAGNESIUM 324 MG
81 TABLET ORAL AT BEDTIME
Refills: 0 | Status: DISCONTINUED | OUTPATIENT
Start: 2023-01-01 | End: 2023-01-01

## 2023-01-01 RX ORDER — MAGNESIUM SULFATE 500 MG/ML
2 VIAL (ML) INJECTION ONCE
Refills: 0 | Status: COMPLETED | OUTPATIENT
Start: 2023-01-01 | End: 2023-01-01

## 2023-01-01 RX ORDER — HEPARIN SODIUM 5000 [USP'U]/ML
900 INJECTION INTRAVENOUS; SUBCUTANEOUS
Qty: 25000 | Refills: 0 | Status: DISCONTINUED | OUTPATIENT
Start: 2023-01-01 | End: 2023-01-01

## 2023-01-01 RX ORDER — DIGOXIN 250 MCG
62.5 TABLET ORAL ONCE
Refills: 0 | Status: COMPLETED | OUTPATIENT
Start: 2023-01-01 | End: 2023-01-01

## 2023-01-01 RX ADMIN — EZETIMIBE 10 MILLIGRAM(S): 10 TABLET ORAL at 21:21

## 2023-01-01 RX ADMIN — HYDROMORPHONE HYDROCHLORIDE 1 MILLIGRAM(S): 2 INJECTION INTRAMUSCULAR; INTRAVENOUS; SUBCUTANEOUS at 22:15

## 2023-01-01 RX ADMIN — Medication 88 MICROGRAM(S): at 05:15

## 2023-01-01 RX ADMIN — Medication 400 MILLIGRAM(S): at 18:27

## 2023-01-01 RX ADMIN — HEPARIN SODIUM 6.5 UNIT(S)/HR: 5000 INJECTION INTRAVENOUS; SUBCUTANEOUS at 19:25

## 2023-01-01 RX ADMIN — Medication 25 MILLIGRAM(S): at 18:47

## 2023-01-01 RX ADMIN — HEPARIN SODIUM 6 UNIT(S)/HR: 5000 INJECTION INTRAVENOUS; SUBCUTANEOUS at 01:48

## 2023-01-01 RX ADMIN — Medication 62.5 MICROGRAM(S): at 08:47

## 2023-01-01 RX ADMIN — HYDROMORPHONE HYDROCHLORIDE 1 MILLIGRAM(S): 2 INJECTION INTRAMUSCULAR; INTRAVENOUS; SUBCUTANEOUS at 02:10

## 2023-01-01 RX ADMIN — Medication 650 MILLIGRAM(S): at 23:44

## 2023-01-01 RX ADMIN — HEPARIN SODIUM 900 UNIT(S)/HR: 5000 INJECTION INTRAVENOUS; SUBCUTANEOUS at 07:23

## 2023-01-01 RX ADMIN — Medication 650 MILLIGRAM(S): at 23:22

## 2023-01-01 RX ADMIN — WARFARIN SODIUM 5 MILLIGRAM(S): 2.5 TABLET ORAL at 21:51

## 2023-01-01 RX ADMIN — Medication 25 GRAM(S): at 10:44

## 2023-01-01 RX ADMIN — ATORVASTATIN CALCIUM 20 MILLIGRAM(S): 80 TABLET, FILM COATED ORAL at 22:34

## 2023-01-01 RX ADMIN — Medication 650 MILLIGRAM(S): at 07:18

## 2023-01-01 RX ADMIN — ATORVASTATIN CALCIUM 20 MILLIGRAM(S): 80 TABLET, FILM COATED ORAL at 21:16

## 2023-01-01 RX ADMIN — Medication 81 MILLIGRAM(S): at 21:32

## 2023-01-01 RX ADMIN — Medication 88 MICROGRAM(S): at 05:20

## 2023-01-01 RX ADMIN — Medication 650 MILLIGRAM(S): at 05:37

## 2023-01-01 RX ADMIN — HEPARIN SODIUM 2000 UNIT(S): 5000 INJECTION INTRAVENOUS; SUBCUTANEOUS at 21:04

## 2023-01-01 RX ADMIN — LIDOCAINE 1 PATCH: 4 CREAM TOPICAL at 18:42

## 2023-01-01 RX ADMIN — Medication 20 MILLIGRAM(S): at 09:44

## 2023-01-01 RX ADMIN — Medication 25 MILLIGRAM(S): at 05:15

## 2023-01-01 RX ADMIN — Medication 650 MILLIGRAM(S): at 00:00

## 2023-01-01 RX ADMIN — Medication 650 MILLIGRAM(S): at 00:30

## 2023-01-01 RX ADMIN — Medication 81 MILLIGRAM(S): at 11:44

## 2023-01-01 RX ADMIN — HEPARIN SODIUM 600 UNIT(S)/HR: 5000 INJECTION INTRAVENOUS; SUBCUTANEOUS at 11:41

## 2023-01-01 RX ADMIN — HEPARIN SODIUM 600 UNIT(S)/HR: 5000 INJECTION INTRAVENOUS; SUBCUTANEOUS at 03:39

## 2023-01-01 RX ADMIN — Medication 650 MILLIGRAM(S): at 17:15

## 2023-01-01 RX ADMIN — Medication 20 MILLIEQUIVALENT(S): at 16:10

## 2023-01-01 RX ADMIN — Medication 650 MILLIGRAM(S): at 16:22

## 2023-01-01 RX ADMIN — HYDROMORPHONE HYDROCHLORIDE 1 MILLIGRAM(S): 2 INJECTION INTRAMUSCULAR; INTRAVENOUS; SUBCUTANEOUS at 23:38

## 2023-01-01 RX ADMIN — LIDOCAINE 1 PATCH: 4 CREAM TOPICAL at 11:34

## 2023-01-01 RX ADMIN — Medication 88 MICROGRAM(S): at 07:11

## 2023-01-01 RX ADMIN — Medication 650 MILLIGRAM(S): at 12:22

## 2023-01-01 RX ADMIN — HEPARIN SODIUM 0 UNIT(S)/HR: 5000 INJECTION INTRAVENOUS; SUBCUTANEOUS at 13:12

## 2023-01-01 RX ADMIN — HYDROMORPHONE HYDROCHLORIDE 1 MILLIGRAM(S): 2 INJECTION INTRAMUSCULAR; INTRAVENOUS; SUBCUTANEOUS at 00:59

## 2023-01-01 RX ADMIN — Medication 1 APPLICATION(S): at 17:31

## 2023-01-01 RX ADMIN — HEPARIN SODIUM 600 UNIT(S)/HR: 5000 INJECTION INTRAVENOUS; SUBCUTANEOUS at 07:35

## 2023-01-01 RX ADMIN — SODIUM CHLORIDE 2 GRAM(S): 9 INJECTION INTRAMUSCULAR; INTRAVENOUS; SUBCUTANEOUS at 21:21

## 2023-01-01 RX ADMIN — ATORVASTATIN CALCIUM 20 MILLIGRAM(S): 80 TABLET, FILM COATED ORAL at 21:32

## 2023-01-01 RX ADMIN — ATORVASTATIN CALCIUM 20 MILLIGRAM(S): 80 TABLET, FILM COATED ORAL at 21:00

## 2023-01-01 RX ADMIN — Medication 81 MILLIGRAM(S): at 11:24

## 2023-01-01 RX ADMIN — Medication 650 MILLIGRAM(S): at 23:28

## 2023-01-01 RX ADMIN — CYCLOBENZAPRINE HYDROCHLORIDE 5 MILLIGRAM(S): 10 TABLET, FILM COATED ORAL at 16:44

## 2023-01-01 RX ADMIN — HEPARIN SODIUM 0 UNIT(S)/HR: 5000 INJECTION INTRAVENOUS; SUBCUTANEOUS at 19:39

## 2023-01-01 RX ADMIN — Medication 650 MILLIGRAM(S): at 19:03

## 2023-01-01 RX ADMIN — Medication 40 MILLIEQUIVALENT(S): at 13:03

## 2023-01-01 RX ADMIN — Medication 81 MILLIGRAM(S): at 22:34

## 2023-01-01 RX ADMIN — ATORVASTATIN CALCIUM 20 MILLIGRAM(S): 80 TABLET, FILM COATED ORAL at 21:05

## 2023-01-01 RX ADMIN — Medication 88 MICROGRAM(S): at 05:17

## 2023-01-01 RX ADMIN — HEPARIN SODIUM 4500 UNIT(S): 5000 INJECTION INTRAVENOUS; SUBCUTANEOUS at 18:10

## 2023-01-01 RX ADMIN — Medication 25 MILLIGRAM(S): at 00:29

## 2023-01-01 RX ADMIN — Medication 88 MICROGRAM(S): at 05:34

## 2023-01-01 RX ADMIN — EZETIMIBE 10 MILLIGRAM(S): 10 TABLET ORAL at 21:05

## 2023-01-01 RX ADMIN — HEPARIN SODIUM 600 UNIT(S)/HR: 5000 INJECTION INTRAVENOUS; SUBCUTANEOUS at 19:41

## 2023-01-01 RX ADMIN — Medication 81 MILLIGRAM(S): at 11:34

## 2023-01-01 RX ADMIN — Medication 25 MILLIGRAM(S): at 18:03

## 2023-01-01 RX ADMIN — HEPARIN SODIUM 600 UNIT(S)/HR: 5000 INJECTION INTRAVENOUS; SUBCUTANEOUS at 19:29

## 2023-01-01 RX ADMIN — HEPARIN SODIUM 1100 UNIT(S)/HR: 5000 INJECTION INTRAVENOUS; SUBCUTANEOUS at 18:17

## 2023-01-01 RX ADMIN — Medication 650 MILLIGRAM(S): at 19:20

## 2023-01-01 RX ADMIN — Medication 650 MILLIGRAM(S): at 12:30

## 2023-01-01 RX ADMIN — Medication 25 MILLIGRAM(S): at 19:03

## 2023-01-01 RX ADMIN — HEPARIN SODIUM 0 UNIT(S)/HR: 5000 INJECTION INTRAVENOUS; SUBCUTANEOUS at 03:28

## 2023-01-01 RX ADMIN — Medication 650 MILLIGRAM(S): at 00:11

## 2023-01-01 RX ADMIN — ATORVASTATIN CALCIUM 20 MILLIGRAM(S): 80 TABLET, FILM COATED ORAL at 22:13

## 2023-01-01 RX ADMIN — LIDOCAINE 1 PATCH: 4 CREAM TOPICAL at 18:47

## 2023-01-01 RX ADMIN — EZETIMIBE 10 MILLIGRAM(S): 10 TABLET ORAL at 22:35

## 2023-01-01 RX ADMIN — Medication 81 MILLIGRAM(S): at 11:32

## 2023-01-01 RX ADMIN — Medication 650 MILLIGRAM(S): at 06:15

## 2023-01-01 RX ADMIN — Medication 25 MILLIGRAM(S): at 05:29

## 2023-01-01 RX ADMIN — Medication 650 MILLIGRAM(S): at 23:11

## 2023-01-01 RX ADMIN — CYCLOBENZAPRINE HYDROCHLORIDE 5 MILLIGRAM(S): 10 TABLET, FILM COATED ORAL at 12:40

## 2023-01-01 RX ADMIN — Medication 81 MILLIGRAM(S): at 21:16

## 2023-01-01 RX ADMIN — Medication 200 MILLIGRAM(S): at 16:22

## 2023-01-01 RX ADMIN — HEPARIN SODIUM 600 UNIT(S)/HR: 5000 INJECTION INTRAVENOUS; SUBCUTANEOUS at 08:12

## 2023-01-01 RX ADMIN — Medication 650 MILLIGRAM(S): at 22:20

## 2023-01-01 RX ADMIN — Medication 650 MILLIGRAM(S): at 06:30

## 2023-01-01 RX ADMIN — Medication 25 MILLIGRAM(S): at 17:10

## 2023-01-01 RX ADMIN — HEPARIN SODIUM 6 UNIT(S)/HR: 5000 INJECTION INTRAVENOUS; SUBCUTANEOUS at 21:58

## 2023-01-01 RX ADMIN — Medication 650 MILLIGRAM(S): at 05:29

## 2023-01-01 RX ADMIN — Medication 500 MICROGRAM(S): at 11:52

## 2023-01-01 RX ADMIN — Medication 650 MILLIGRAM(S): at 06:02

## 2023-01-01 RX ADMIN — Medication 1 TABLET(S): at 12:20

## 2023-01-01 RX ADMIN — Medication 650 MILLIGRAM(S): at 05:42

## 2023-01-01 RX ADMIN — Medication 650 MILLIGRAM(S): at 12:01

## 2023-01-01 RX ADMIN — Medication 81 MILLIGRAM(S): at 21:21

## 2023-01-01 RX ADMIN — HEPARIN SODIUM 600 UNIT(S)/HR: 5000 INJECTION INTRAVENOUS; SUBCUTANEOUS at 21:02

## 2023-01-01 RX ADMIN — Medication 1 TABLET(S): at 11:34

## 2023-01-01 RX ADMIN — Medication 88 MICROGRAM(S): at 05:37

## 2023-01-01 RX ADMIN — HYDROMORPHONE HYDROCHLORIDE 1 MILLIGRAM(S): 2 INJECTION INTRAMUSCULAR; INTRAVENOUS; SUBCUTANEOUS at 00:29

## 2023-01-01 RX ADMIN — Medication 650 MILLIGRAM(S): at 21:20

## 2023-01-01 RX ADMIN — Medication 40 MILLIGRAM(S): at 20:44

## 2023-01-01 RX ADMIN — HEPARIN SODIUM 1100 UNIT(S)/HR: 5000 INJECTION INTRAVENOUS; SUBCUTANEOUS at 19:38

## 2023-01-01 RX ADMIN — SODIUM CHLORIDE 2 GRAM(S): 9 INJECTION INTRAMUSCULAR; INTRAVENOUS; SUBCUTANEOUS at 12:00

## 2023-01-01 RX ADMIN — Medication 88 MICROGRAM(S): at 05:27

## 2023-01-01 RX ADMIN — Medication 20 MILLIGRAM(S): at 12:59

## 2023-01-01 RX ADMIN — WARFARIN SODIUM 3 MILLIGRAM(S): 2.5 TABLET ORAL at 22:35

## 2023-01-01 RX ADMIN — LIDOCAINE 1 PATCH: 4 CREAM TOPICAL at 00:56

## 2023-01-01 RX ADMIN — Medication 650 MILLIGRAM(S): at 05:19

## 2023-01-01 RX ADMIN — HYDROMORPHONE HYDROCHLORIDE 1 MILLIGRAM(S): 2 INJECTION INTRAMUSCULAR; INTRAVENOUS; SUBCUTANEOUS at 01:40

## 2023-01-01 RX ADMIN — Medication 650 MILLIGRAM(S): at 13:30

## 2023-01-01 RX ADMIN — Medication 650 MILLIGRAM(S): at 21:45

## 2023-01-01 RX ADMIN — ATORVASTATIN CALCIUM 20 MILLIGRAM(S): 80 TABLET, FILM COATED ORAL at 21:36

## 2023-01-01 RX ADMIN — Medication 650 MILLIGRAM(S): at 16:42

## 2023-01-01 RX ADMIN — Medication 100 MILLIGRAM(S): at 11:52

## 2023-01-01 RX ADMIN — Medication 40 MILLIGRAM(S): at 13:43

## 2023-01-01 RX ADMIN — Medication 20 MILLIGRAM(S): at 18:02

## 2023-01-01 RX ADMIN — Medication 62.5 MICROGRAM(S): at 05:44

## 2023-01-01 RX ADMIN — LIDOCAINE 1 PATCH: 4 CREAM TOPICAL at 00:00

## 2023-01-01 RX ADMIN — Medication 650 MILLIGRAM(S): at 06:07

## 2023-01-01 RX ADMIN — ONDANSETRON 4 MILLIGRAM(S): 8 TABLET, FILM COATED ORAL at 17:42

## 2023-01-01 RX ADMIN — HEPARIN SODIUM 600 UNIT(S)/HR: 5000 INJECTION INTRAVENOUS; SUBCUTANEOUS at 21:47

## 2023-01-01 RX ADMIN — EZETIMIBE 10 MILLIGRAM(S): 10 TABLET ORAL at 22:14

## 2023-01-01 RX ADMIN — MENTHOL AND METHYL SALICYLATE 1 APPLICATION(S): 10; 30 CREAM TOPICAL at 05:15

## 2023-01-01 RX ADMIN — Medication 88 MICROGRAM(S): at 05:43

## 2023-01-01 RX ADMIN — Medication 650 MILLIGRAM(S): at 18:42

## 2023-01-01 RX ADMIN — Medication 25 MILLIGRAM(S): at 08:47

## 2023-01-01 RX ADMIN — Medication 250 MICROGRAM(S): at 01:41

## 2023-01-01 RX ADMIN — Medication 25 MILLIGRAM(S): at 18:50

## 2023-01-01 RX ADMIN — Medication 650 MILLIGRAM(S): at 18:47

## 2023-01-01 RX ADMIN — Medication 2 SPRAY(S): at 13:13

## 2023-01-01 RX ADMIN — Medication 25 MILLIGRAM(S): at 17:42

## 2023-01-01 RX ADMIN — Medication 25 MILLIGRAM(S): at 05:43

## 2023-01-01 RX ADMIN — WARFARIN SODIUM 3 MILLIGRAM(S): 2.5 TABLET ORAL at 21:06

## 2023-01-01 RX ADMIN — POLYETHYLENE GLYCOL 3350 17 GRAM(S): 17 POWDER, FOR SOLUTION ORAL at 12:01

## 2023-01-01 RX ADMIN — MENTHOL AND METHYL SALICYLATE 1 APPLICATION(S): 10; 30 CREAM TOPICAL at 00:32

## 2023-01-01 RX ADMIN — SODIUM CHLORIDE 2 GRAM(S): 9 INJECTION INTRAMUSCULAR; INTRAVENOUS; SUBCUTANEOUS at 05:38

## 2023-01-01 RX ADMIN — Medication 650 MILLIGRAM(S): at 05:15

## 2023-01-01 RX ADMIN — HEPARIN SODIUM 600 UNIT(S)/HR: 5000 INJECTION INTRAVENOUS; SUBCUTANEOUS at 04:06

## 2023-01-01 RX ADMIN — Medication 25 MILLIGRAM(S): at 14:56

## 2023-01-01 RX ADMIN — Medication 650 MILLIGRAM(S): at 01:41

## 2023-01-01 RX ADMIN — Medication 40 MILLIGRAM(S): at 13:19

## 2023-01-01 RX ADMIN — TRAMADOL HYDROCHLORIDE 25 MILLIGRAM(S): 50 TABLET ORAL at 18:09

## 2023-01-01 RX ADMIN — Medication 1 APPLICATION(S): at 05:18

## 2023-01-01 RX ADMIN — ATORVASTATIN CALCIUM 20 MILLIGRAM(S): 80 TABLET, FILM COATED ORAL at 21:09

## 2023-01-01 RX ADMIN — Medication 100 MILLIGRAM(S): at 15:06

## 2023-01-01 RX ADMIN — MORPHINE SULFATE 2 MILLIGRAM(S): 50 CAPSULE, EXTENDED RELEASE ORAL at 21:39

## 2023-01-01 RX ADMIN — HEPARIN SODIUM 500 UNIT(S)/HR: 5000 INJECTION INTRAVENOUS; SUBCUTANEOUS at 11:20

## 2023-01-01 RX ADMIN — Medication 25 MILLIGRAM(S): at 18:04

## 2023-01-01 RX ADMIN — Medication 650 MILLIGRAM(S): at 11:34

## 2023-01-01 RX ADMIN — Medication 40 MILLIGRAM(S): at 12:16

## 2023-01-01 RX ADMIN — Medication 650 MILLIGRAM(S): at 05:32

## 2023-01-01 RX ADMIN — Medication 40 MILLIGRAM(S): at 00:20

## 2023-01-01 RX ADMIN — POLYETHYLENE GLYCOL 3350 17 GRAM(S): 17 POWDER, FOR SOLUTION ORAL at 11:44

## 2023-01-01 RX ADMIN — HEPARIN SODIUM 600 UNIT(S)/HR: 5000 INJECTION INTRAVENOUS; SUBCUTANEOUS at 07:27

## 2023-01-01 RX ADMIN — Medication 81 MILLIGRAM(S): at 21:05

## 2023-01-01 RX ADMIN — ATORVASTATIN CALCIUM 20 MILLIGRAM(S): 80 TABLET, FILM COATED ORAL at 21:21

## 2023-01-01 RX ADMIN — Medication 1 TABLET(S): at 12:02

## 2023-01-01 RX ADMIN — ATORVASTATIN CALCIUM 20 MILLIGRAM(S): 80 TABLET, FILM COATED ORAL at 21:49

## 2023-01-01 RX ADMIN — HEPARIN SODIUM 600 UNIT(S)/HR: 5000 INJECTION INTRAVENOUS; SUBCUTANEOUS at 08:40

## 2023-01-01 RX ADMIN — HYDROMORPHONE HYDROCHLORIDE 1 MILLIGRAM(S): 2 INJECTION INTRAMUSCULAR; INTRAVENOUS; SUBCUTANEOUS at 21:48

## 2023-01-01 RX ADMIN — Medication 20 MILLIGRAM(S): at 17:25

## 2023-01-01 RX ADMIN — Medication 81 MILLIGRAM(S): at 12:01

## 2023-01-01 RX ADMIN — Medication 1 APPLICATION(S): at 13:14

## 2023-01-01 RX ADMIN — WARFARIN SODIUM 3 MILLIGRAM(S): 2.5 TABLET ORAL at 21:32

## 2023-01-01 RX ADMIN — HYDROMORPHONE HYDROCHLORIDE 1 MILLIGRAM(S): 2 INJECTION INTRAMUSCULAR; INTRAVENOUS; SUBCUTANEOUS at 23:08

## 2023-01-01 RX ADMIN — Medication 650 MILLIGRAM(S): at 18:50

## 2023-01-01 RX ADMIN — Medication 650 MILLIGRAM(S): at 11:24

## 2023-01-01 RX ADMIN — MENTHOL AND METHYL SALICYLATE 1 APPLICATION(S): 10; 30 CREAM TOPICAL at 17:11

## 2023-01-01 RX ADMIN — HEPARIN SODIUM 900 UNIT(S)/HR: 5000 INJECTION INTRAVENOUS; SUBCUTANEOUS at 03:09

## 2023-01-01 RX ADMIN — Medication 650 MILLIGRAM(S): at 02:11

## 2023-01-01 RX ADMIN — Medication 650 MILLIGRAM(S): at 12:37

## 2023-01-01 RX ADMIN — Medication 650 MILLIGRAM(S): at 13:00

## 2023-01-01 RX ADMIN — Medication 25 MILLIGRAM(S): at 14:26

## 2023-01-01 RX ADMIN — Medication 25 MILLIGRAM(S): at 12:17

## 2023-01-01 RX ADMIN — SENNA PLUS 2 TABLET(S): 8.6 TABLET ORAL at 21:05

## 2023-01-01 RX ADMIN — SODIUM CHLORIDE 2 GRAM(S): 9 INJECTION INTRAMUSCULAR; INTRAVENOUS; SUBCUTANEOUS at 05:20

## 2023-01-01 RX ADMIN — Medication 88 MICROGRAM(S): at 08:48

## 2023-01-01 RX ADMIN — Medication 62.5 MICROGRAM(S): at 05:16

## 2023-01-01 RX ADMIN — Medication 88 MICROGRAM(S): at 05:19

## 2023-01-01 RX ADMIN — POLYETHYLENE GLYCOL 3350 17 GRAM(S): 17 POWDER, FOR SOLUTION ORAL at 05:16

## 2023-01-01 RX ADMIN — LIDOCAINE 1 PATCH: 4 CREAM TOPICAL at 12:00

## 2023-01-01 RX ADMIN — SODIUM CHLORIDE 2 GRAM(S): 9 INJECTION INTRAMUSCULAR; INTRAVENOUS; SUBCUTANEOUS at 18:47

## 2023-01-01 RX ADMIN — HEPARIN SODIUM 6.5 UNIT(S)/HR: 5000 INJECTION INTRAVENOUS; SUBCUTANEOUS at 11:10

## 2023-01-01 RX ADMIN — Medication 250 MICROGRAM(S): at 18:50

## 2023-01-01 RX ADMIN — SENNA PLUS 2 TABLET(S): 8.6 TABLET ORAL at 21:49

## 2023-01-01 RX ADMIN — Medication 88 MICROGRAM(S): at 05:33

## 2023-01-01 RX ADMIN — Medication 81 MILLIGRAM(S): at 21:50

## 2023-01-01 RX ADMIN — Medication 650 MILLIGRAM(S): at 00:56

## 2023-01-01 RX ADMIN — Medication 650 MILLIGRAM(S): at 05:53

## 2023-01-01 RX ADMIN — Medication 50 MILLIGRAM(S): at 22:13

## 2023-01-01 RX ADMIN — EZETIMIBE 10 MILLIGRAM(S): 10 TABLET ORAL at 21:49

## 2023-01-01 RX ADMIN — SENNA PLUS 2 TABLET(S): 8.6 TABLET ORAL at 23:44

## 2023-01-01 RX ADMIN — WARFARIN SODIUM 3 MILLIGRAM(S): 2.5 TABLET ORAL at 21:21

## 2023-01-01 RX ADMIN — HEPARIN SODIUM 600 UNIT(S)/HR: 5000 INJECTION INTRAVENOUS; SUBCUTANEOUS at 13:53

## 2023-01-01 RX ADMIN — Medication 40 MILLIGRAM(S): at 12:01

## 2023-01-01 RX ADMIN — Medication 25 MILLIGRAM(S): at 05:19

## 2023-01-01 RX ADMIN — Medication 650 MILLIGRAM(S): at 00:06

## 2023-01-01 RX ADMIN — Medication 40 MILLIEQUIVALENT(S): at 09:45

## 2023-01-01 RX ADMIN — Medication 650 MILLIGRAM(S): at 20:00

## 2023-01-01 RX ADMIN — HEPARIN SODIUM 600 UNIT(S)/HR: 5000 INJECTION INTRAVENOUS; SUBCUTANEOUS at 21:33

## 2023-01-01 RX ADMIN — EZETIMIBE 10 MILLIGRAM(S): 10 TABLET ORAL at 21:32

## 2023-01-01 RX ADMIN — MORPHINE SULFATE 2 MILLIGRAM(S): 50 CAPSULE, EXTENDED RELEASE ORAL at 21:09

## 2023-01-01 RX ADMIN — SODIUM CHLORIDE 2 GRAM(S): 9 INJECTION INTRAMUSCULAR; INTRAVENOUS; SUBCUTANEOUS at 23:44

## 2023-01-01 RX ADMIN — EZETIMIBE 10 MILLIGRAM(S): 10 TABLET ORAL at 21:16

## 2023-02-03 NOTE — PHYSICAL EXAM
[No Acute Distress] : no acute distress [Normal Rate] : normal rate  [Regular Rhythm] : with a regular rhythm [No Murmur] : no murmur heard [No Edema] : there was no peripheral edema [Normal] : soft, non-tender, non-distended, no masses palpated, no HSM and normal bowel sounds [Normal Posterior Cervical Nodes] : no posterior cervical lymphadenopathy [Normal Anterior Cervical Nodes] : no anterior cervical lymphadenopathy [No Focal Deficits] : no focal deficits [Alert and Oriented x3] : oriented to person, place, and time [de-identified] : metallic heart sounds consistent with history

## 2023-02-03 NOTE — HISTORY OF PRESENT ILLNESS
[de-identified] : Presents for BP check, labs, and general follow-up.  States feeling generally well; has some arthritic issues, but "getting by."

## 2023-02-03 NOTE — ASSESSMENT
[FreeTextEntry1] : Hemodynamically stable with acceptable BP\par Cardiac status stable with findings consistent with history\par No clinical evidence of thyroid dysfunction\par Lab profiles drawn in office and sent

## 2023-03-16 NOTE — ED PROVIDER NOTE - CCCP TRG CHIEF CMPLNT
In an effort to ensure that our patients LiveWell, a Team Member has reviewed your chart and identified an opportunity to provide the best care possible. An attempt was made to discuss or schedule overdue Preventive or Disease Management screening.     The Outcome was Contact was made, appointment scheduled. Care Gaps include Cervical Cancer Screening, Immunizations and Wellness Visits.      Pt scheduled to see Nevin Pulido on 04/25/2023 at 4 pm.  No further questions.    abdominal pain

## 2023-04-26 NOTE — HISTORY OF PRESENT ILLNESS
Lm to call and schedule
[FreeTextEntry1] : 87-year-old female with a history of coronary artery disease, St. Rehan's aortic valve replacement, paroxysmal atrial fibrillation, hypercholesterolemia, anxiety.  She was last seen 4 months ago.  Her   of prostate cancer in January.  She is grieving, but coping with the help of the help of her daughter.  Help of her daughter.  She continues to experience some palpitations about the same as in the past.  She has no exertional complaints.\par \par Her most recent LDL cholesterol was 92 on Zetia/rosuvastatin.

## 2023-04-26 NOTE — DISCUSSION/SUMMARY
[FreeTextEntry1] : Ms Gillespie continues to experience palpitations about the same as in the past.  As noted she has been distressed due to the recent death of her .  Her exam shows no change in her weight, regular rhythm, clear lungs, closing click of her aortic prosthesis,  1+ peripheral edema.  Her EKG shows sinus rhythm and is within normal limits.\par \par An INR was done which was 1.7.\par \par She is stable and will continue on her current regimen of Coumadin, rosuvastatin, Zetia, and metoprolol 50.  She will follow-up in 4 months. [EKG obtained to assist in diagnosis and management of assessed problem(s)] : EKG obtained to assist in diagnosis and management of assessed problem(s)

## 2023-05-10 PROBLEM — R73.01 ELEVATED FASTING GLUCOSE: Status: ACTIVE | Noted: 2022-01-01

## 2023-05-10 NOTE — HISTORY OF PRESENT ILLNESS
[de-identified] : Presents for BP check, labs, and general follow-up.  Trying to remain as active as possible.  Reviewed recent Cardiology documentation - no changes.

## 2023-05-10 NOTE — PHYSICAL EXAM
[No Acute Distress] : no acute distress [Normal] : no respiratory distress, lungs were clear to auscultation bilaterally and no accessory muscle use [Normal Rate] : normal rate  [Regular Rhythm] : with a regular rhythm [No Murmur] : no murmur heard [No Edema] : there was no peripheral edema [Soft] : abdomen soft [Non Tender] : non-tender [Normal Posterior Cervical Nodes] : no posterior cervical lymphadenopathy [Normal Anterior Cervical Nodes] : no anterior cervical lymphadenopathy [No Focal Deficits] : no focal deficits [Alert and Oriented x3] : oriented to person, place, and time [de-identified] : metallic heart sounds consistent with history

## 2023-05-25 PROBLEM — M19.039 WRIST ARTHRITIS: Status: ACTIVE | Noted: 2023-01-01

## 2023-07-10 PROBLEM — M65.30 TRIGGER FINGER OF RIGHT HAND: Status: ACTIVE | Noted: 2023-01-01

## 2023-07-10 NOTE — END OF VISIT
[FreeTextEntry3] : All medical record entries made by the Scribe were at my,  Dr. Adi Sung MD., direction and personally dictated by me on 07/10/2023. I have personally reviewed the chart and agree that the record accurately reflects my personal performance of the history, physical exam, assessment and plan.

## 2023-07-10 NOTE — DISCUSSION/SUMMARY
[FreeTextEntry1] : The underlying pathophysiology was reviewed with the patient. Discussed at length the nature of the patient’s condition. The right long finger symptoms are secondary to trigger finger. \par \par At this time, I recommended a cortisone injection at the flexor tendon sheath of the right long finger. \par The patient wishes to proceed with a cortisone injection at this time. The skin was prepped with alcohol and sprayed with Ethyl Chloride. An injection of 0.5 cc 1% Lidocaine without epinephrine, 0.25 cc Kenalog 40 mg, and 0.25 cc Dexamethasone was administered into the flexor tendon sheath of the RIGHT long finger (1/3). The patient tolerated the procedure well. Apply ice. \par \par All questions answered, understanding verbalized. Patient in agreement with plan of care. Follow up as needed.

## 2023-07-10 NOTE — HISTORY OF PRESENT ILLNESS
[FreeTextEntry1] : Pt is an 89 y/o female with right long trigger finger.  She states that the finger locked for 2.5 days and she was unable to extend it.  She has been wearing a splint since then to avoid flexion.  She has tenderness over the A1 pulley.  She has not had a cortisone injection for this.

## 2023-07-10 NOTE — PHYSICAL EXAM
[de-identified] : Patient is WDWN, alert, and in no acute distress. Breathing is unlabored. She is grossly oriented to person, place, and time.\par \par Right hand: \par There is A1 pulley tenderness in the RIGHT long finger with intermittent locking. Full arc of motion in the fingers, and all intrinsic and extrinsic hand muscles 5/5. No joint instability on provocative testing, sensation is intact to light touch, and no skin lesions or discoloration. [de-identified] : no imaging today

## 2023-07-10 NOTE — ADDENDUM
[FreeTextEntry1] : I, Meryl Ramirez wrote this note acting as a scribe for Dr. Adi Sung on Jul 10, 2023.

## 2023-08-04 PROBLEM — M19.049 CMC ARTHRITIS: Status: ACTIVE | Noted: 2021-10-29

## 2023-08-04 PROBLEM — M19.049 HAND ARTHRITIS: Status: ACTIVE | Noted: 2023-01-01

## 2023-08-04 NOTE — DISCUSSION/SUMMARY
[de-identified] : I went over the pathophysiology of the patient's symptoms in great detail with the patient. We recommended the use of Volatern gel to help relive left thumb pain. We discussed the use of ice, Tylenol and anti-inflammatories to relieve pain. At this time, she will start a course of occupational therapy for strengthening and flexibility. A prescription was provided. We recommended the use of a CMC or basil joint thumb brace at this time.   All of their questions were answered. They understand and consent to the plan.   FU in 6 weeks.

## 2023-08-04 NOTE — PHYSICAL EXAM
[de-identified] : Constitutional o Appearance : well-nourished, well developed, alert, in no acute distress  Head and Face o Head :  Inspection : atraumatic, normocephalic o Face :  Inspection : no visible rash or discoloration Respiratory o Respiratory Effort: breathing unlabored  Neurologic o Sensation : Normal sensation  Psychiatric o Mood and Affect: mood normal, affect appropriate  Lymphatic o Additional Nodes : No palpable lymph nodes present   Right Upper Extremity o Right Elbow :  Inspection/Palpation : tenderness over the triceps and the olecranon, prominence of the olecranon, no swelling  Range of Motion : full and painless in all planes, no crepitance  Strength : flexion and extension 5/5  Stability : no joint instability on provocative testing  o Sensation : sensation intact to light touch o Tests: Tinel's Sign negative, no pain with resisted extension and supination  o Right Wrist:  Inspection/Palpation : no tenderness, swelling or deformities  Range of Motion : full and painless in all planes, no crepitance  Strength : extension, flexion, ulnar deviation and radial deviation 5/5  Stability : no joint instability on provocative testing  Tests/Signs : Tinel's sign negative over carpal tunnel , negative Phalen's, negative Finkelstein's test   o Right Hand :  Inspection/Palpation : no tenderness to palpation or pain with axial compression, Heberden's nodes index and long finger  Range of Motion : full arc of motion in the small joints of the hand, no discomfort elicited  Strength : all intrinsic and extrinsic hand muscles 5/5  Stability : no joint instability on provocative testing o Sensation : sensation intact to light touch o Skin : no skin lesions or discoloration   Left Upper Extremity o Left Elbow :  Inspection/Palpation : no tenderness, no swelling or deformities  Range of Motion : full and painless in all planes, no crepitance  Strength : flexion and extension 5/5  Stability : no joint instability on provocative testing  o Sensation : sensation intact to light touch o Tests: Tinel's Sign negative, no pain with resisted extension and supination of the forearm  o Left Wrist:  Inspection/Palpation : basal joint tenderness, no swelling  Range of Motion : limited dorsiflexion and volar flexion, full pronation and supination   Strength : extension, flexion, ulnar deviation and radial deviation 5/5  Stability : no joint instability on provocative testing  Tests/Signs : Tinel's sign negative over carpal tunnel, negative Phalen's, negative Finkelstein's test, positive grind test  o Left Hand :  Inspection/Palpation : no tenderness to palpation or pain with axial compression, Heberden's nodes of the index and long fingers  Range of Motion : full arc of motion in the small joints of the hand, no discomfort elicited  Strength : all intrinsic and extrinsic hand muscles 5/5  Stability : no joint instability on provocative testing  o Sensation : sensation intact to light touch o Skin : no skin lesions or discoloration   Gait and Station:  o Gait: gait normal, no significant extremity swelling or lymphedema, good proprioception and balance

## 2023-08-04 NOTE — HISTORY OF PRESENT ILLNESS
[de-identified] : 88-year-old RHD female presents for a follow up of left thumb pain.  She had a left thumb basil joint injection on 05/25/2023 and reports no relief. She notes she has trouble opening car doors. She localizes the pain over the basal joint of the thumb.  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 Degenerative change of the DIP joints of the 2nd-5th fingers.

## 2023-08-04 NOTE — ADDENDUM
[FreeTextEntry1] : I, Julio Hernandez, acted solely as a scribe for Dr. Arthur Cagle on this date 05/25/2023.\par  \par  All medical record entries made by the Scribe were at my, Dr. Arthur Cagle, direction and personally dictated by me on 05/25/2023. I have reviewed the chart and agree that the record accurately reflects my personal performance of the history, physical exam, assessment and plan. I have also personally directed, reviewed, and agreed with the chart.

## 2023-08-19 NOTE — H&P ADULT - HISTORY OF PRESENT ILLNESS
88 year old female with past medical  A.fib and Mechanical AVR (St. Rehan) on coumadin, CAD, HTN/HLD, hypothyroidism, history of cholelithiasis/acute cholecystitis (managed conservatively 8/2022), who presents from home with few week history of worsening shortness of breath.  Patient lives at home alone and is independent at baseline. She endorses few week history of worsening shortness of breath, bilateral feet swelling and orthopnea. She denies chest pain but noted chest tightness and palpitation with shortness of breath. No fever but has had chills. Dry cough since today. No sick contact.    In the ED, she had low grade tempt of 99.4, -115, BP stable, O2Sat 87% and placed on 3L NC,  Labs notable for IN 4.4, Trop 70.7 -> 83.1, BUN/Cr 16/1.32, totali bili 2.3, BNP 1990.  EKG showed:   CXR showed Moderate right and small left pleural effusions.    She was given levaquin, lasix 20mg IVP x 1 and metoprolol 25mg PO QD   88 year old female with past medical  A.fib and Mechanical AVR (St. Rehan) on coumadin, CAD, HTN/HLD, hypothyroidism, history of cholelithiasis/acute cholecystitis (managed conservatively 8/2022), who presents from home with few week history of worsening shortness of breath.  Patient lives at home alone and is independent at baseline. She endorses few week history of worsening shortness of breath, bilateral feet swelling and orthopnea. She denies chest pain but noted chest tightness and palpitation with shortness of breath. No fever but has had chills. Dry cough since today. No sick contact.    In the ED, she had low grade tempt of 99.4, -115, BP stable, O2Sat 87% and placed on 3L NC,  Labs notable for IN 4.4, Trop 70.7 -> 83.1, BUN/Cr 16/1.32, totali bili 2.3, BNP 1990.  EKG on presentation initially showed sinus tachycardia with , non-specific ST-T wave changes. She is currently in A.fib.  CXR showed Moderate right and small left pleural effusions.    She was given levaquin, lasix 20mg IVP x 1 and metoprolol 25mg PO QD

## 2023-08-19 NOTE — ED ADULT NURSE REASSESSMENT NOTE - NS ED NURSE REASSESS COMMENT FT1
pt placed in hospital bed for comfort at this time. provided warm blankets and pillows. will continue to monitor.

## 2023-08-19 NOTE — H&P ADULT - NSHPLABSRESULTS_GEN_ALL_CORE
CXR  FINDINGS:  Prior study dated 8/2/2022 was available for review.    The lungs demonstrate moderate right and small left pleural effusions.   The heart is difficult to evaluate. Patient is status post median   sternotomy and valve replacement.      IMPRESSION:  Moderate right and small left pleural effusions noted.   Status post valve replacement. LABS:                        11.7   9.32  )-----------( 200      ( 19 Aug 2023 12:06 )             35.4     08-19    137  |  102  |  16  ----------------------------<  146<H>  4.0   |  27  |  1.32<H>    Ca    9.5      19 Aug 2023 12:06    TPro  7.0  /  Alb  3.3  /  TBili  2.3<H>  /  DBili  x   /  AST  42<H>  /  ALT  40  /  AlkPhos  75  08-19    PT/INR - ( 19 Aug 2023 12:06 )   PT: 49.2 sec;   INR: 4.39 ratio       PTT - ( 19 Aug 2023 12:06 )  PTT:38.9 sec    RADIOLOGY & ADDITIONAL TESTS:    CXR  FINDINGS:  Prior study dated 8/2/2022 was available for review.    The lungs demonstrate moderate right and small left pleural effusions.   The heart is difficult to evaluate. Patient is status post median   sternotomy and valve replacement.      IMPRESSION:  Moderate right and small left pleural effusions noted.   Status post valve replacement.    RUQ U/S  IMPRESSION:  Cholelithiasis without evidence of cholecystitis or biliary obstruction.

## 2023-08-19 NOTE — H&P ADULT - NSHPPOAPULMEMBOLUS_GEN_A_CORE
no
Ears: no ear pain and no hearing problems. Nose: no nasal congestion and no nasal drainage. Mouth/Throat: no dysphagia, no hoarseness and no throat pain. Neck: no lumps, no pain, no stiffness and no swollen glands.

## 2023-08-19 NOTE — H&P ADULT - NSHPPHYSICALEXAM_GEN_ALL_CORE
T(C): 37.4 (08-19-23 @ 12:00), Max: 37.4 (08-19-23 @ 12:00)  HR: 115 (08-19-23 @ 14:53) (106 - 116)  BP: 119/90 (08-19-23 @ 14:53) (110/72 - 141/91)  RR: 17 (08-19-23 @ 14:53) (16 - 18)  SpO2: 94% (08-19-23 @ 14:53) (87% - 95%)  Wt(kg): --Vital Signs Last 24 Hrs  T(C): 37.4 (19 Aug 2023 12:00), Max: 37.4 (19 Aug 2023 12:00)  T(F): 99.4 (19 Aug 2023 12:00), Max: 99.4 (19 Aug 2023 12:00)  HR: 115 (19 Aug 2023 14:53) (106 - 116)  BP: 119/90 (19 Aug 2023 14:53) (110/72 - 141/91)  BP(mean): 98 (19 Aug 2023 14:53) (98 - 104)  RR: 17 (19 Aug 2023 14:53) (16 - 18)  SpO2: 94% (19 Aug 2023 14:53) (87% - 95%)    Parameters below as of 19 Aug 2023 14:53  Patient On (Oxygen Delivery Method): nasal cannula  O2 Flow (L/min): 3      PHYSICAL EXAM:  GENERAL: NAD, pleasant elderly female laying comfortably in bed  HEENT: NCAT, moist mucous membrane  NECK: Supple, No JVD, Normal thyroid  NERVOUS SYSTEM:  CN II - XII intact; Sensation intact; Motor Strength 5/5 B/L upper and lower extremities  CHEST/LUNG: Clear to percussion bilaterally; No rales, rhonchi, wheezing, or rubs; normal respiratory effort, no intercostal retractions; No pitting edema  HEART: Regular rate and rhythm; No murmurs, rubs, or gallops  ABDOMEN: Soft, Nontender, Nondistended; Bowel sounds present; No HSM  MUSCULOSKELETAL/EXTREMITIES:  2+ Peripheral Pulses, No clubbing, or digital cyanosis  Examination of the joints, bones, and muscles of one or more of the following six areas:  1. head and neck 2. spine, ribs, and pelvis 3. right upper extremity 4. left upper extremity 5. right lower extremity 6. left lower extremity   ROM (pain, crepitation or contracture)  SKIN: No rashes or lesions; normal texture and temperature  PSYCH: Appropriate affect, Alert & Oriented x 3 T(C): 37.4 (08-19-23 @ 12:00), Max: 37.4 (08-19-23 @ 12:00)  HR: 115 (08-19-23 @ 14:53) (106 - 116)  BP: 119/90 (08-19-23 @ 14:53) (110/72 - 141/91)  RR: 17 (08-19-23 @ 14:53) (16 - 18)  SpO2: 94% (08-19-23 @ 14:53) (87% - 95%)  Wt(kg): --Vital Signs Last 24 Hrs  T(C): 37.4 (19 Aug 2023 12:00), Max: 37.4 (19 Aug 2023 12:00)  T(F): 99.4 (19 Aug 2023 12:00), Max: 99.4 (19 Aug 2023 12:00)  HR: 115 (19 Aug 2023 14:53) (106 - 116)  BP: 119/90 (19 Aug 2023 14:53) (110/72 - 141/91)  BP(mean): 98 (19 Aug 2023 14:53) (98 - 104)  RR: 17 (19 Aug 2023 14:53) (16 - 18)  SpO2: 94% (19 Aug 2023 14:53) (87% - 95%)    Parameters below as of 19 Aug 2023 14:53  Patient On (Oxygen Delivery Method): nasal cannula  O2 Flow (L/min): 3      PHYSICAL EXAM:  GENERAL: NAD, pleasant elderly female laying comfortably in bed  HEENT: NCAT, moist mucous membrane  NECK: Supple, No JVD  NERVOUS SYSTEM:  AAO x 3, non-focal,  Motor Strength 5/5 B/L upper and lower extremities  CHEST/LUNG: Diminished at bases (R>L)  HEART: Irregular, tachycardic  ABDOMEN: Soft, Nontender, Nondistended; Bowel sounds present;   MUSCULOSKELETAL/EXTREMITIES:  1+ pitting edema bilaterally   SKIN: No rashes   PSYCH: Appropriate affect, Alert & Oriented x 3

## 2023-08-19 NOTE — ED ADULT NURSE NOTE - OBJECTIVE STATEMENT
Pt c/o SOB, palpitations x 2 weeks. Denies any chest pain. Reports SOB worsens at night. Hx of Afib on coumadin. pt found to have O2 sat 87%on RA on arrival. placed on 3LNC for O2 sat 95%.

## 2023-08-19 NOTE — ED PROVIDER NOTE - PROGRESS NOTE DETAILS
Patient with infiltrate on x-ray, will treat with antibiotics, will order blood cultures and lactate  Not meeting sepsis criteria at this time and patient acutely symptomatic of fluid overload/CHF will hold fluids at this time

## 2023-08-19 NOTE — ED PROVIDER NOTE - NS ED ROS FT
Except as otherwise indicated in HPI:  CONSTITUTIONAL: fatigue  HEENT: neg  CV: palp  Resp: sob  GI: Neg  : Neg  MSK: Neg  SKIN: Neg  NEURO: Neg  PSYCHIATRIC: Neg  Heme/Onc: Neg

## 2023-08-19 NOTE — H&P ADULT - ASSESSMENT
88 year old female with past medical  A.fib and Mechanical AVR ([facility]) on coumadin, CAD, HTN/HLD, hypothyroidism, history of cholelithiasis/acute cholecystitis (managed conservatively 8/2022), who presents from home with few week history of worsening shortness of breath, bilateral LE and orthopnea. Labs notable for elevated troponin, BNP and CXR showing bilateral pleural effusion (R>L).    Acute Hypoxic Respiratory Failure 2/2 New Onset Heart Failure  Bilateral Pleural Effusion (R>L)  Elevated Troponin  -Trop 70.7 -> 83.1, likely demand ischemia in setting of rapid A.fib and CHF  -Continue to trend trop until peak, trend EKG  -s/p IV lasix 20mg x 1 with good response, will give addition 20mg IVP now  -Start Lasix 40mg IVP QD tomorrow, close monitoring of renal function and electrolytes  -Strict in's and out's  -Daily weight  -Check echo  -O2 supplementation for O2Sat >92%  -Cardiology consulted (Dr. Pruitt - to see tomorrow)    A.fib with RVR/Mechanical AVR  -s/p oral metoprolol 25mg PO x 1 with improvement  -Resume home dose Metoprolol ER 50mg PO QD  -INR supratherapeutic, no evidence of bleed  -Hold coumadin tonight, repeat in AM    CAD/HTN/HLD  -Continue aspirin, metoprolol, atorvastatin for crestor while inpt, zetia  -Resume zetia on dischar    Low Grade Fever/Chills/Cough  -Check respiratory panel/COVID  -Check CT chest to r/o underlying PNA  -Supportive care: tessalon perles PRN    Elevated Bilirubin/History of Acute Cholecystitis  -RUQ u/s showed Cholelithiasis without evidence of cholecystitis or biliary obstruction, patient is asymptomatic  -Elevated total bili appears chronic, will check direct bili  -Monitor for now    Hypothyrodism  -Check TSH  -Continue levothyroxine     Vertigo  -Meclizine PRN    DVT PPx  -Coumadin    Plan of case discussed with patient at length, she does not want me to call her daughter (Sandrita [phone]) for update 88 year old female with past medical  A.fib and Mechanical AVR ([facility]) on coumadin, CAD, HTN/HLD, hypothyroidism, history of cholelithiasis/acute cholecystitis (managed conservatively 8/2022), who presents from home with few week history of worsening shortness of breath, bilateral LE and orthopnea. Labs notable for elevated troponin, BNP and CXR showing bilateral pleural effusion (R>L).    Acute Hypoxic Respiratory Failure 2/2 New Onset Heart Failure  Bilateral Pleural Effusion (R>L)  Elevated Troponin  -Trop 70.7 -> 83.1, likely demand ischemia in setting of rapid A.fib and CHF  -Continue to trend trop until peak, trend EKG  -s/p IV lasix 20mg x 1 with good response, will give addition 20mg IVP now  -Start Lasix 40mg IVP QD tomorrow, close monitoring of renal function and electrolytes  -Strict in's and out's  -Daily weight  -Check echo  -O2 supplementation for O2Sat >92%  -Repeat CXR in a couple of days  -Cardiology consulted (Dr. Pruitt - to see tomorrow)    A.fib with RVR/Mechanical AVR  -s/p oral metoprolol 25mg PO x 1 with improvement  -Resume home dose Metoprolol ER 50mg PO QD  -INR supratherapeutic, no evidence of bleed  -Hold coumadin tonight, repeat in AM    CAD/HTN/HLD  -Continue aspirin, metoprolol, atorvastatin for crestor while inpt, zetia  -Echo as above    Low Grade Fever/Chills/Cough  -Check respiratory panel/COVID  -Check CT chest to r/o underlying PNA  -Supportive care: tessalon perles PRN    Elevated Bilirubin/History of Acute Cholecystitis  -RUQ u/s showed Cholelithiasis without evidence of cholecystitis or biliary obstruction, patient is asymptomatic  -Elevated total bili appears chronic, will check direct bili  -Monitor for now    Hypothyroidism  -Check TSH  -Continue levothyroxine     Vertigo  -Meclizine PRN    DVT PPx  -Coumadin    Advance Directive  -Full Code    Plan of case discussed with patient at length, she does not want me to call her daughter (Sandrita [phone]) for update 88 year old female with past medical  A.fib and Mechanical AVR ([facility]) on coumadin, CAD, HTN/HLD, hypothyroidism, history of cholelithiasis/acute cholecystitis (managed conservatively 8/2022), who presents from home with few week history of worsening shortness of breath, bilateral LE and orthopnea. Labs notable for elevated troponin, BNP and CXR showing bilateral pleural effusion (R>L).    Acute Hypoxic Respiratory Failure 2/2 New Onset Heart Failure  Bilateral Pleural Effusion (R>L)  Elevated Troponin  -Trop 70.7 -> 83.1, likely demand ischemia in setting of rapid A.fib and CHF  -Continue to trend trop until peak, trend EKG  -s/p IV lasix 20mg x 1 with good response, will give addition 20mg IVP now  -Start Lasix 40mg IVP QD tomorrow, close monitoring of renal function and electrolytes  -Strict in's and out's  -Daily weight  -Check echo  -O2 supplementation for O2Sat >92%  -Repeat CXR in a couple of days  -Cardiology consulted (Dr. Pruitt - to see tomorrow)    A.fib with RVR/Mechanical AVR  -s/p oral metoprolol 25mg PO x 1 with improvement  -Resume home dose Metoprolol ER 50mg PO QD  -INR supratherapeutic, no evidence of bleed  -Hold coumadin tonight, repeat in AM    CAD/HTN/HLD  -Continue aspirin, metoprolol, atorvastatin for crestor while inpt, zetia  -Echo as above    Low Grade Fever/Chills/Cough  -Check respiratory panel/COVID  -Check CT chest to r/o underlying PNA  -Supportive care: tessalon perles PRN    Elevated Bilirubin/History of Acute Cholecystitis  -RUQ u/s showed Cholelithiasis without evidence of cholecystitis or biliary obstruction, patient is asymptomatic  -Elevated total bili appears chronic, will check direct bili  -Monitor for now    Hypothyroidism  -Check TSH  -Continue levothyroxine     Vertigo  -Meclizine PRN    Likely CKD  -Baseline Cr ~1.2 range  -Close monitoring of renal function while on diuretics  -Avoid nephrotoxic agents    DVT PPx  -Coumadin    Advance Directive  -Full Code    Plan of case discussed with patient at length, she does not want me to call her daughter (Sandrita [phone]) for update

## 2023-08-19 NOTE — ED PROVIDER NOTE - NSICDXPASTSURGICALHX_GEN_ALL_CORE_FT
Unable to consent due to medical condition
PAST SURGICAL HISTORY:  History of appendectomy     S/P AVR

## 2023-08-19 NOTE — ED ADULT NURSE NOTE - NSFALLUNIVINTERV_ED_ALL_ED
Bed/Stretcher in lowest position, wheels locked, appropriate side rails in place/Call bell, personal items and telephone in reach/Instruct patient to call for assistance before getting out of bed/chair/stretcher/Non-slip footwear applied when patient is off stretcher/[place] to call system/Physically safe environment - no spills, clutter or unnecessary equipment/Purposeful proactive rounding/Room/bathroom lighting operational, light cord in reach

## 2023-08-19 NOTE — ED PROVIDER NOTE - PHYSICAL EXAMINATION
Gen:  alert, awake, no acute distress  Head:  atraumatic, normocephalic  HEENT: PERRLA, EOMI, normal nose, normal oropharynx, no tonsillar edema, erythema, or exudate  CV:  regular tachycardia, nl S1, S2, no m/r/g  Pulm:  lungs CTA b/l  Abd: s/nt/nd, +BS  MSK:  moving all extremities, no back midline ttp, no stepoffs, no cva TTP, 1-2+ b/l symmetric pitting edema  Neuro:  grossly intact, no focal deficits  Skin:  clear, dry, intact  Psych: AOx3, normal affect, no apparent risk to self or others Gen:  alert, awake, no acute distress  Head:  atraumatic, normocephalic  HEENT: PERRLA, EOMI, normal nose, normal oropharynx, no tonsillar edema, erythema, or exudate  CV:  regular tachycardia, nl S1, S2, no m/r/g, coarse at bases right greater than left  Pulm:  lungs CTA b/l  Abd: s/nt/nd, +BS  MSK:  moving all extremities, no back midline ttp, no stepoffs, no cva TTP, 1-2+ b/l symmetric pitting edema  Neuro:  grossly intact, no focal deficits  Skin:  clear, dry, intact  Psych: AOx3, normal affect, no apparent risk to self or others

## 2023-08-19 NOTE — ED PROVIDER NOTE - CLINICAL SUMMARY MEDICAL DECISION MAKING FREE TEXT BOX
88 years old with history hypertension, atrial fib on Coumadin presents with 1-2 weeks history of fatigue, not feeling well and shortness of breath.  Notes leg swelling as well.  Denies fever.  Noted cough upon arrival to ED.  Denies travel or sick contacts.  Patient has history of cholecystitis and does note that she has had abdominal pain, no nausea or vomiting.     cbc, cmp, trop, pbnp, cxr, rectal temp, coags, re-assess 88 years old with history hypertension, atrial fib on Coumadin presents with 1-2 weeks history of fatigue, not feeling well and shortness of breath.  Notes leg swelling as well.  Denies fever.  Noted cough upon arrival to ED.  Denies travel or sick contacts.  Patient has history of cholecystitis and does note that she has had abdominal pain, no nausea or vomiting.     cbc, cmp, trop, pbnp, cxr, rectal temp, coags, re-assess    Patient with new onset CHF will treat with diuretics questionable infiltrate on x-ray will give antibiotics  Case discussed with hospitalist, Dr. Dodson will admit for new onset CHF  Patient with elevation in troponin repeat EKG showing A-fib 105 will give oral Lopressor patient denies any chest pain discussed with Dr. Dodson discussed cardiology evaluation, Dr. Dodson will assess

## 2023-08-19 NOTE — ED PROVIDER NOTE - OBJECTIVE STATEMENT
88 years old with history hypertension, atrial fib on Coumadin presents with 1-2 weeks history of fatigue, not feeling well and shortness of breath.  Notes leg swelling as well.  Denies fever.  Noted cough upon arrival to ED.  Denies travel or sick contacts.  Patient has history of cholecystitis and does note that she has had abdominal pain, no nausea or vomiting.

## 2023-08-20 NOTE — PATIENT PROFILE ADULT - FALL HARM RISK - RISK INTERVENTIONS

## 2023-08-20 NOTE — DIETITIAN INITIAL EVALUATION ADULT - PERSON TAUGHT/METHOD
Heart failure nutrition therapy, high K+ sources, coumadin/vitamin k interaction, constipation nutrition therapy/verbal instruction/written material/teach back - (Patient repeats in own words)/patient instructed/daughter instructed

## 2023-08-20 NOTE — DIETITIAN INITIAL EVALUATION ADULT - ADD RECOMMEND
1. add Ensure HP qd  2. Heart failure nutrition therapy reviewed + written materials provided  3. Banana c bfast, prunes with dinner qd. High potassium sources reviewed.

## 2023-08-20 NOTE — DIETITIAN INITIAL EVALUATION ADULT - OTHER INFO
88 year old female with past medical history of A.fib and Mechanical AVR ([facility]) on Coumadin, CAD, HTN, HLD, hypothyroidism, history of cholelithiasis/acute cholecystitis (managed conservatively 8/2022), who presents from home with few week history of worsening shortness of breath, bilateral LE and orthopnea. Labs notable for elevated troponin, BNP and CXR showing bilateral pleural effusion (R>L).  Pt reports improving appetite, ate >50% of lunch today. Stating that she enjoyed meal provided. Daughter states that recent weight loss is likely. Reports UBW 125lbs, although weight loss possibly masked due to leg swelling. +lasix. Pt receptive to trial of Ensure qd to optimize nutritional intake. Noted hypokalemia, repletion in progress. Pt also requesting high K+ foods to replete (reviewed sources, will add banana to bfast qd). Heart failure nutrition therapy reviewed with pt and daughter +written materials provided for reference.

## 2023-08-20 NOTE — PROGRESS NOTE ADULT - ASSESSMENT
88 year old female with past medical history of A.fib and Mechanical AVR ([facility]) on Coumadin, CAD, HTN, HLD, hypothyroidism, history of cholelithiasis/acute cholecystitis (managed conservatively 8/2022), who presents from home with few week history of worsening shortness of breath, bilateral LE and orthopnea. Labs notable for elevated troponin, BNP and CXR showing bilateral pleural effusion (R>L).    Acute Hypoxic Respiratory Failure  ?New Onset Heart Failure  Bilateral Pleural Effusion (R>L)  Elevated Troponin  - CT showed mildly loculated moderate right pleural effusion, small layering left   pleural effusion, atelectasis/consolidation of right middle lobe, basilar right lower lobe and anterobasilar left lower lobe  -Trop likely demand ischemia in setting of rapid A.fib and CHF, downtrending  -Continue Lasix 40mg IVP daily, close monitoring of renal function and electrolytes  -Strict in's and out's  -Daily weights  -Check echo  -O2 supplementation for O2 Sat >92%, titrating down as able  -Incentive spirometer  -Cardiology Dr. Pruitt consulted, recs appreciated  -Will consider pulm eval pending clinical progression    A.fib with RVR  Mechanical AVR  -Switched to Metoprolol tartrate 25 mg PO QID, takes 50 mg at home  -INR supratherapeutic, no evidence of bleed  -Continue to hold coumadin tonight, repeat in AM  -Full thyroid panel in AM    CAD/HTN/HLD  -Continue aspirin, metoprolol, atorvastatin for crestor while inpatient, zetia  -Echo as above    Low Grade Fever/Chills/Cough  -Respiratory panel/COVID negative  -CT chest findings noted above  -Supportive care: tessalon perles PRN  -Monitor for fevers, symptoms    Hypothyroidism  -Per [employer] 5/10/23 last TSH 2.27, free T4 1.3  -TSH 7.7 8/20  -Will repeat TFTs in AM  -Continue home dose levothyroxine 88 mcg for now    Elevated Bilirubin/History of Acute Cholecystitis  -RUQ u/s showed cholelithiasis without evidence of cholecystitis or biliary obstruction, patient is asymptomatic  -Elevated total bili appears chronic, direct bili borderline elevated  -Monitor for now    Vertigo  -Meclizine PRN    Likely CKD  -Baseline Cr ~1.2 range  -Close monitoring of renal function while on diuretics  -Avoid nephrotoxic agents    DVT PPx  -Coumadin    Advance Directive  -Full Code    8/20: Plan discussed with patient, her daughter (drove here from [place] due to her mother's hospitalization) and cardiologist Dr. Pruitt. 88 year old female with past medical history of A.fib and Mechanical AVR ([facility]) on Coumadin, CAD, HTN, HLD, hypothyroidism, history of cholelithiasis/acute cholecystitis (managed conservatively 8/2022), presented from home with few week history of worsening shortness of breath, bilateral LE swelling and orthopnea. Labs notable for elevated troponin, BNP and CXR showing bilateral pleural effusion (R>L).    Acute Hypoxic Respiratory Failure  ?New Onset Heart Failure  Bilateral Pleural Effusion (R>L)  Elevated Troponin  - CT showed mildly loculated moderate right pleural effusion, small layering left pleural effusion, atelectasis/consolidation of right middle lobe, basilar right lower lobe and anterobasilar left lower lobe  -Trop likely demand ischemia in setting of rapid A.fib and CHF, downtrending  -Continue Lasix 40mg IVP daily, close monitoring of renal function and electrolytes  -Strict in's and out's  -Daily weights  -Check echo  -O2 supplementation for O2 Sat >92%, titrating down as able  -Incentive spirometer  -Cardiology Dr. Pruitt consulted, recs appreciated  -Will consider pulm eval pending clinical progression    A.fib with RVR  Mechanical AVR  -Switched to Metoprolol tartrate 25 mg PO QID, takes 50 mg at home  -INR supratherapeutic, no evidence of bleed  -Continue to hold coumadin tonight, repeat in AM    CAD/HTN/HLD  -Continue aspirin, metoprolol, atorvastatin for crestor while inpatient, zetia  -Echo as above    Low Grade Fever/Chills/Cough  -Respiratory panel/COVID negative  -CT chest findings noted above  -Supportive care: tessalon perles PRN  -Monitor for fevers, symptoms    Hypothyroidism  -Per [facility] 5/10/23 last TSH 2.27, free T4 1.3  -TSH 7.7 8/20  -Will repeat TFTs  -Continue home dose levothyroxine 88 mcg for now    Elevated Bilirubin/History of Acute Cholecystitis  -RUQ u/s showed cholelithiasis without evidence of cholecystitis or biliary obstruction, patient is asymptomatic  -Elevated total bili appears chronic, direct bili borderline elevated  -Monitor for now    Leg Cramping  -In setting of IV diuresis, ?2/2 electrolytes  -Bilateral LE venous dopplers to r/o DVT  -K and Mg repleted    Vertigo  -Meclizine PRN    Likely CKD  -Baseline Cr ~1.2 range  -Close monitoring of renal function while on diuretics  -Avoid nephrotoxic agents    DVT PPx  -Holding Coumadin    Advance Directive  -Full Code    8/20: Plan discussed with patient, her daughter (drove here from [place] due to her mother's hospitalization) and cardiologist Dr. Pruitt.

## 2023-08-20 NOTE — CONSULT NOTE ADULT - SUBJECTIVE AND OBJECTIVE BOX
SAMANTHA VIVAS  [id number]      HPI:    Samantha Vivas is an 88 year old woman, follows with cardiologist Dr. Jamie Alonzo with past medical history of Paroxysmal atrial fibrillation (on coumadin), Mechanical Aortic valve replacement ([facility]), Coronary artery disease, Hypertension & Hypothyroidism presents with increasing fatigue and shortness of breath over the past several weeks.    The patient's daughter is present to help provide additional history. The patient reports waking up with shortness of breath and noticing shortness of breath on exertion. Also reports leg swelling. Denies chest pain.       ALLERGIES:  penicillin (Rash)  sulfa drugs (Rash)      PAST MEDICAL & SURGICAL HISTORY:  S/P AVR  Atrial fibrillation  Hypothyroidism      CURRENT MEDICATIONS:  acetaminophen     Tablet .. 650 milliGRAM(s) Oral every 6 hours PRN  aspirin enteric coated 81 milliGRAM(s) Oral at bedtime  atorvastatin 20 milliGRAM(s) Oral at bedtime  benzonatate 100 milliGRAM(s) Oral every 8 hours PRN  ezetimibe 10 milliGRAM(s) Oral at bedtime  furosemide   Injectable 40 milliGRAM(s) IV Push <User Schedule>  levothyroxine 88 MICROGram(s) Oral daily  meclizine 12.5 milliGRAM(s) Oral every 8 hours PRN  metoprolol succinate ER 50 milliGRAM(s) Oral at bedtime  potassium chloride    Tablet ER 40 milliEquivalent(s) Oral every 4 hours  potassium chloride    Tablet ER 20 milliEquivalent(s) Oral once      ROS:  All 10 systems reviewed and positives noted in HPI    OBJECTIVE:    VITAL SIGNS:  Vital Signs Last 24 Hrs  T(C): 36.3 (20 Aug 2023 09:24), Max: 37.4 (19 Aug 2023 12:00)  T(F): 97.3 (20 Aug 2023 09:24), Max: 99.4 (19 Aug 2023 12:00)  HR: 106 (20 Aug 2023 09:24) (99 - 117)  BP: 119/76 (20 Aug 2023 09:24) (105/69 - 141/91)  BP(mean): 89 (19 Aug 2023 22:18) (86 - 104)  RR: 18 (20 Aug 2023 09:24) (15 - 19)  SpO2: 98% (20 Aug 2023 09:24) (87% - 98%)    Parameters below as of 20 Aug 2023 05:30  Patient On (Oxygen Delivery Method): nasal cannula  O2 Flow (L/min): 4      PHYSICAL EXAM:  General: no distress  HEENT: sclera anicteric  Neck: supple  CVS: JVP ~ 7 cm H20, RRR, s1, s2, no murmurs/rubs/gallops  Chest: unlabored respirations  Abdomen: non-distended  Extremities: no lower extremity edema b/l  Neuro: awake, alert & oriented  Psych: normal affect      LABS:                        11.5   9.34  )-----------( 190      ( 20 Aug 2023 07:05 )             33.8     08-20    138  |  98  |  18  ----------------------------<  104<H>  3.3<L>   |  32<H>  |  1.28    Ca    9.6      20 Aug 2023 07:05  Mg     1.7     08-20    TPro  7.0  /  Alb  3.2<L>  /  TBili  2.5<H>  /  DBili  0.4<H>  /  AST  44<H>  /  ALT  29  /  AlkPhos  72  08-20        PT/INR - ( 20 Aug 2023 07:25 )   PT: 45.3 sec;   INR: 4.14 ratio         PTT - ( 19 Aug 2023 12:06 )  PTT:38.9 sec      Outpatient TTE (2021):  LVEF 69%  Normal RV size and function   LA dilation   Mechanical AVR  Mild MR, Mild mitral stenosis, Moderate TR  No pericardial effusion       ECG (8/19/23) 14:32: atrial fibrillation with RVR, lateral ST depressions (new)     CXR (8/19/23):  IMPRESSION:  Moderate right and small left pleural effusions noted.   Status post valve replacement.

## 2023-08-20 NOTE — DIETITIAN INITIAL EVALUATION ADULT - PERTINENT LABORATORY DATA
08-20    138  |  98  |  18  ----------------------------<  104<H>  3.3<L>   |  32<H>  |  1.28    Ca    9.6      20 Aug 2023 07:05  Mg     1.7     08-20    TPro  7.0  /  Alb  3.2<L>  /  TBili  2.5<H>  /  DBili  0.4<H>  /  AST  44<H>  /  ALT  29  /  AlkPhos  72  08-20

## 2023-08-20 NOTE — PROGRESS NOTE ADULT - SUBJECTIVE AND OBJECTIVE BOX
Patient is a 88y old  Female who presents with a chief complaint of SOB.    Patient seen and examined with her daughter at bedside. No acute overnight events. She remains on O2 support by nasal cannula. Aside from her difficulty breathing, she has no new/acute complaints, no chest pain.    ALLERGIES:  penicillin (Rash)  sulfa drugs (Rash)    MEDICATIONS  (STANDING):  aspirin enteric coated 81 milliGRAM(s) Oral at bedtime  atorvastatin 20 milliGRAM(s) Oral at bedtime  ezetimibe 10 milliGRAM(s) Oral at bedtime  furosemide   Injectable 40 milliGRAM(s) IV Push <User Schedule>  levothyroxine 88 MICROGram(s) Oral daily  magnesium sulfate  IVPB 2 Gram(s) IV Intermittent once  metoprolol tartrate 25 milliGRAM(s) Oral four times a day  potassium chloride    Tablet ER 40 milliEquivalent(s) Oral every 4 hours  potassium chloride    Tablet ER 20 milliEquivalent(s) Oral once    MEDICATIONS  (PRN):  acetaminophen     Tablet .. 650 milliGRAM(s) Oral every 6 hours PRN Mild Pain (1 - 3)  benzonatate 100 milliGRAM(s) Oral every 8 hours PRN Cough  meclizine 12.5 milliGRAM(s) Oral every 8 hours PRN for dizziness    Vital Signs Last 24 Hrs  T(F): 97.3 (20 Aug 2023 09:24), Max: 99.4 (19 Aug 2023 12:00)  HR: 106 (20 Aug 2023 09:24) (99 - 117)  BP: 119/76 (20 Aug 2023 09:24) (105/69 - 141/91)  RR: 18 (20 Aug 2023 09:24) (15 - 19)  SpO2: 98% (20 Aug 2023 09:24) (87% - 98%)    I&O's Summary    BMI (kg/m2): 21.4 (08-19-23 @ 11:19)    PHYSICAL EXAM:  General: NAD, awake, alert  ENT: Moist mucous membranes  Neck: Supple, no JVD  Lungs: Diminished breath sounds bilaterally  Cardio: S1 S2, regular rate and rhythm  Abdomen: Soft, nontender, nondistended, bowel sounds present  Extremities: No calf tenderness, no pitting edema    LABS:                        11.5   9.34  )-----------( 190      ( 20 Aug 2023 07:05 )             33.8       08-20    138  |  98  |  18  ----------------------------<  104  3.3   |  32  |  1.28    Ca    9.6      20 Aug 2023 07:05  Mg     1.7     08-20    TPro  7.0  /  Alb  3.2  /  TBili  2.5  /  DBili  0.4  /  AST  44  /  ALT  29  /  AlkPhos  72  08-20       PT/INR - ( 20 Aug 2023 07:25 )   PT: 45.3 sec;   INR: 4.14 ratio         PTT - ( 19 Aug 2023 12:06 )  PTT:38.9 sec   Lactate, Blood: 1.1 mmol/L (08-19 @ 13:07)    CARDIAC MARKERS ( 20 Aug 2023 08:42 )  x     / 7.1 ng/L / x     / x     / x      CARDIAC MARKERS ( 20 Aug 2023 07:05 )  x     / 85.1 ng/L / x     / x     / x      CARDIAC MARKERS ( 19 Aug 2023 17:25 )  x     / 97.9 ng/L / x     / x     / x      CARDIAC MARKERS ( 19 Aug 2023 13:55 )  x     / 83.1 ng/L / x     / x     / x      CARDIAC MARKERS ( 19 Aug 2023 12:06 )  x     / 70.7 ng/L / x     / x     / x            TSH 7.718   TSH with FT4 reflex --  Total T3 --                  Urinalysis Basic - ( 20 Aug 2023 07:05 )    Color: x / Appearance: x / SG: x / pH: x  Gluc: 104 mg/dL / Ketone: x  / Bili: x / Urobili: x   Blood: x / Protein: x / Nitrite: x   Leuk Esterase: x / RBC: x / WBC x   Sq Epi: x / Non Sq Epi: x / Bacteria: x            RADIOLOGY & ADDITIONAL TESTS:     Care Discussed with Consultants/Other Providers: PM&R Patient is a 88y old  Female who presents with a chief complaint of SOB.    Patient seen and examined with her daughter at bedside. No acute overnight events. She remains on O2 support by nasal cannula. No chest pain. Does note leg cramping.    ALLERGIES:  penicillin (Rash)  sulfa drugs (Rash)      MEDICATIONS  (STANDING):  aspirin enteric coated 81 milliGRAM(s) Oral at bedtime  atorvastatin 20 milliGRAM(s) Oral at bedtime  ezetimibe 10 milliGRAM(s) Oral at bedtime  furosemide   Injectable 40 milliGRAM(s) IV Push <User Schedule>  levothyroxine 88 MICROGram(s) Oral daily  magnesium sulfate  IVPB 2 Gram(s) IV Intermittent once  metoprolol tartrate 25 milliGRAM(s) Oral four times a day  potassium chloride    Tablet ER 40 milliEquivalent(s) Oral every 4 hours  potassium chloride    Tablet ER 20 milliEquivalent(s) Oral once    MEDICATIONS  (PRN):  acetaminophen     Tablet .. 650 milliGRAM(s) Oral every 6 hours PRN Mild Pain (1 - 3)  benzonatate 100 milliGRAM(s) Oral every 8 hours PRN Cough  meclizine 12.5 milliGRAM(s) Oral every 8 hours PRN for dizziness    Vital Signs Last 24 Hrs  T(F): 97.3 (20 Aug 2023 09:24), Max: 99.4 (19 Aug 2023 12:00)  HR: 106 (20 Aug 2023 09:24) (99 - 117)  BP: 119/76 (20 Aug 2023 09:24) (105/69 - 141/91)  RR: 18 (20 Aug 2023 09:24) (15 - 19)  SpO2: 98% (20 Aug 2023 09:24) (87% - 98%)    I&O's Summary    BMI (kg/m2): 21.4 (08-19-23 @ 11:19)    PHYSICAL EXAM:  General: NAD, awake, alert  ENT: Moist mucous membranes  Neck: Supple, no JVD  Lungs: Diminished breath sounds bilaterally  Cardio: S1 S2, regular rate and irregular rhythm  Abdomen: Soft, nontender, nondistended, bowel sounds present  Extremities: No calf tenderness, no pitting edema    LABS:                        11.5   9.34  )-----------( 190      ( 20 Aug 2023 07:05 )             33.8       08-20    138  |  98  |  18  ----------------------------<  104  3.3   |  32  |  1.28    Ca    9.6      20 Aug 2023 07:05  Mg     1.7     08-20    TPro  7.0  /  Alb  3.2  /  TBili  2.5  /  DBili  0.4  /  AST  44  /  ALT  29  /  AlkPhos  72  08-20       PT/INR - ( 20 Aug 2023 07:25 )   PT: 45.3 sec;   INR: 4.14 ratio         PTT - ( 19 Aug 2023 12:06 )  PTT:38.9 sec   Lactate, Blood: 1.1 mmol/L (08-19 @ 13:07)    CARDIAC MARKERS ( 20 Aug 2023 08:42 )  x     / 7.1 ng/L / x     / x     / x      CARDIAC MARKERS ( 20 Aug 2023 07:05 )  x     / 85.1 ng/L / x     / x     / x      CARDIAC MARKERS ( 19 Aug 2023 17:25 )  x     / 97.9 ng/L / x     / x     / x      CARDIAC MARKERS ( 19 Aug 2023 13:55 )  x     / 83.1 ng/L / x     / x     / x      CARDIAC MARKERS ( 19 Aug 2023 12:06 )  x     / 70.7 ng/L / x     / x     / x            TSH 7.718   TSH with FT4 reflex --  Total T3 --                  Urinalysis Basic - ( 20 Aug 2023 07:05 )    Color: x / Appearance: x / SG: x / pH: x  Gluc: 104 mg/dL / Ketone: x  / Bili: x / Urobili: x   Blood: x / Protein: x / Nitrite: x   Leuk Esterase: x / RBC: x / WBC x   Sq Epi: x / Non Sq Epi: x / Bacteria: x            RADIOLOGY & ADDITIONAL TESTS:     Care Discussed with Consultants/Other Providers: PM&R

## 2023-08-20 NOTE — DIETITIAN INITIAL EVALUATION ADULT - PERTINENT MEDS FT
MEDICATIONS  (STANDING):  aspirin enteric coated 81 milliGRAM(s) Oral at bedtime  atorvastatin 20 milliGRAM(s) Oral at bedtime  ezetimibe 10 milliGRAM(s) Oral at bedtime  furosemide   Injectable 40 milliGRAM(s) IV Push <User Schedule>  levothyroxine 88 MICROGram(s) Oral daily  metoprolol tartrate 25 milliGRAM(s) Oral four times a day  potassium chloride    Tablet ER 20 milliEquivalent(s) Oral once    MEDICATIONS  (PRN):  acetaminophen     Tablet .. 650 milliGRAM(s) Oral every 6 hours PRN Mild Pain (1 - 3)  benzonatate 100 milliGRAM(s) Oral every 8 hours PRN Cough  meclizine 12.5 milliGRAM(s) Oral every 8 hours PRN for dizziness

## 2023-08-20 NOTE — DIETITIAN INITIAL EVALUATION ADULT - ORAL INTAKE PTA/DIET HISTORY
Pt endorses decreased appetite over the last 3 weeks with onset of symptoms. Pt states that she often eats soups that she gets from [employer] or [employer] bread. Does not add salt to foods otherwise. Aware of consistent Vitamin K recommendations while on Couamdin (reinforced education). NKFA. No chewing/swallowing issues.

## 2023-08-21 NOTE — PROGRESS NOTE ADULT - ASSESSMENT
88 year old female with past medical history of A.fib and Mechanical AVR ([facility]) on Coumadin, CAD, HTN, HLD, hypothyroidism, history of cholelithiasis/acute cholecystitis (managed conservatively 8/2022), presented from home with few week history of worsening shortness of breath, bilateral LE swelling and orthopnea. Labs notable for elevated troponin, BNP and CXR showing bilateral pleural effusion (R>L).    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  Bilateral Pleural Effusion (R>L)  Elevated Troponin  -Trop likely demand ischemia in setting of rapid A.fib and CHF, downtrended  -Echo noted with normal LV systolic function, EF 60-65%, severe MR and TR, moderate pulm HTN  -CT showed mildly loculated moderate right pleural effusion, small layering left pleural effusion, atelectasis/consolidation of right middle lobe, basilar right lower lobe and anterobasilar left lower lobe  -Repeat CXR 8/21 with improvement  -Lasix 40mg IVP increased from daily to BID 8/21, close monitoring of renal function and electrolytes  -Strict in's and out's, daily weights  -O2 supplementation for O2 Sat >92%, titrated down to 2L today  -Incentive spirometer  -Cardiology Dr. Pruitt consulted, recs appreciated  -Will consider pulm eval pending clinical progression    A.fib with RVR  Mechanical AVR  -Switched to Metoprolol tartrate 25 mg PO QID, takes 50 mg at home  -INR now therapeutic  -Restarted Coumadin at 3 mg    CAD/HTN/HLD  -Continue aspirin, metoprolol, atorvastatin for crestor while inpatient, zetia  -Echo as above    Low Grade Fever/Chills/Cough  -Respiratory panel/COVID negative  -CT chest findings noted above  -Supportive care: tessalon perles PRN  -Monitor for fevers, symptoms    Hypothyroidism  -Per [employer] 5/10/23 last TSH 2.27, free T4 1.3  -TSH elevated in setting of acute illness  -Continue home dose levothyroxine 88 mcg for now  -Repeat TFTs after discharge with PCP    Elevated Bilirubin with likely component of congestive hepatopathy  History of Acute Cholecystitis  -RUQ u/s showed cholelithiasis without evidence of cholecystitis or biliary obstruction, patient is asymptomatic  -Elevated total bili appears chronic, improved with diuresis  -Monitor for now    Leg Cramping, resolved  -Acute on chronic in setting of IV diuresis, ?2/2 electrolytes  -Bilateral LE venous dopplers negative for DVT  -K and Mg repleted 8/20  -Warm compresses, stretching    Vertigo  -Meclizine PRN    Likely CKD  -Baseline Cr ~1.2 range  -Close monitoring of renal function while on diuretics  -Avoid nephrotoxic agents    DVT PPx  -Coumadin resumed    Advance Directive  -Full Code    8/21: Plan discussed with patient, her daughter (Rachel Delgadillo, [phone]) and cardiologist Dr. Pruitt.

## 2023-08-21 NOTE — PROGRESS NOTE ADULT - SUBJECTIVE AND OBJECTIVE BOX
Patient is a 88y old  Female who presents with a chief complaint of Heart failure (20 Aug 2023 14:26)    Patient seen and examined at bedside. No acute overnight events. She reports her leg cramps have improved; this is a chronic issue that she has had for a long time on and off, the cramps did not feel different/changed.    Overnight tele monitor records reviewed, was in AF at low 100s (105-106) overnight, up to 110s this AM.  ALLERGIES:  penicillin (Rash)  sulfa drugs (Rash)    MEDICATIONS  (STANDING):  aspirin enteric coated 81 milliGRAM(s) Oral at bedtime  atorvastatin 20 milliGRAM(s) Oral at bedtime  digoxin  Injectable 250 MICROGram(s) IV Push every 6 hours  ezetimibe 10 milliGRAM(s) Oral at bedtime  furosemide   Injectable 40 milliGRAM(s) IV Push two times a day  levothyroxine 88 MICROGram(s) Oral daily  metoprolol tartrate 25 milliGRAM(s) Oral four times a day  warfarin 3 milliGRAM(s) Oral once    MEDICATIONS  (PRN):  acetaminophen     Tablet .. 650 milliGRAM(s) Oral every 6 hours PRN Mild Pain (1 - 3)  benzonatate 100 milliGRAM(s) Oral every 8 hours PRN Cough  meclizine 12.5 milliGRAM(s) Oral every 8 hours PRN for dizziness  methyl salicylate 15%/menthol 10% Topical Cream 1 Application(s) Topical three times a day PRN leg pain    Vital Signs Last 24 Hrs  T(F): 98 (21 Aug 2023 05:18), Max: 98 (21 Aug 2023 05:18)  HR: 113 (21 Aug 2023 05:18) (110 - 113)  BP: 102/62 (21 Aug 2023 08:31) (97/63 - 117/77)  RR: 18 (21 Aug 2023 05:18) (18 - 18)  SpO2: 96% (21 Aug 2023 05:18) (96% - 98%)    I&O's Summary    20 Aug 2023 07:01  -  21 Aug 2023 07:00  --------------------------------------------------------  IN: 240 mL / OUT: 1200 mL / NET: -960 mL      BMI (kg/m2): 21.4 (08-19-23 @ 11:19)    PHYSICAL EXAM:  General: NAD, lying in bed  Eyes: Anicteric  ENT: Moist mucous membranes  Neck: Supple, No JVD  Lungs: Clear to auscultation bilaterally, normal respiratory effort  Cardio: irregular rhythm, slightly tachycardic (low 100s), S1/S2  Abdomen: Soft, Nontender, Nondistended; Bowel sounds present  Extremities: No calf tenderness, trace bilateral ankle edema (improving), no pitting edema  Skin: Warm, dry  Psych: A&O x 3, follows commands    LABS:                        12.0   8.98  )-----------( 215      ( 21 Aug 2023 07:09 )             36.1       08-21    139  |  100  |  23  ----------------------------<  98  4.5   |  29  |  1.28    Ca    9.6      21 Aug 2023 07:09  Mg     2.1     08-21    TPro  7.5  /  Alb  3.2  /  TBili  1.9  /  DBili  x   /  AST  55  /  ALT  43  /  AlkPhos  75  08-21       PT/INR - ( 21 Aug 2023 07:25 )   PT: 30.4 sec;   INR: 2.67 ratio         PTT - ( 19 Aug 2023 12:06 )  PTT:38.9 sec   Lactate, Blood: 1.1 mmol/L (08-19 @ 13:07)    CARDIAC MARKERS ( 20 Aug 2023 08:42 )  x     / 7.1 ng/L / x     / x     / x      CARDIAC MARKERS ( 20 Aug 2023 07:05 )  x     / 85.1 ng/L / x     / x     / x      CARDIAC MARKERS ( 19 Aug 2023 17:25 )  x     / 97.9 ng/L / x     / x     / x      CARDIAC MARKERS ( 19 Aug 2023 13:55 )  x     / 83.1 ng/L / x     / x     / x      CARDIAC MARKERS ( 19 Aug 2023 12:06 )  x     / 70.7 ng/L / x     / x     / x          TSH 9.724   TSH with FT4 reflex --  Total T3 57      Culture - Blood (collected 19 Aug 2023 13:07)  Source: .Blood Blood-Peripheral  Preliminary Report (20 Aug 2023 22:02):    No growth at 24 hours    Culture - Blood (collected 19 Aug 2023 13:07)  Source: .Blood Blood-Peripheral  Preliminary Report (20 Aug 2023 22:02):    No growth at 24 hours          RADIOLOGY & ADDITIONAL TESTS:   Personally reviewed.     Consultant(s) Notes Reviewed:  [x] YES  [ ] NO    Care Discussed with Consultants/Other Providers:

## 2023-08-21 NOTE — PROGRESS NOTE ADULT - ASSESSMENT
Assessment:  Samantha Gillespie is an 88 year old woman, follows with cardiologist Dr. Jamie Alonzo with past medical history of Paroxysmal atrial fibrillation (on coumadin), Mechanical Aortic valve replacement ([facility]), Coronary artery disease, Hypertension & Hypothyroidism presents with increasing fatigue, orthopnea and dyspnea on exertion over the past several weeks, found to have recurrent atrial fibrillation and congestive heart failure secondary to severe mitral and tricuspid regurgitation.     ECG on admission consistent with atrial fibrillation with RVR and lateral ST depressions, the ST depressions are new and likely demand ischemia from CHF. Troponins mildly elevated and have peaked, likely demand ischemia from AF and CHF. Pro BNP elevated. CT chest consistent with bilateral pleural effusions.     Recommendations:  [] Atrial fibrillation with RVR, Mechanical AVR: AF with elevated HR, would trial IV digoxin loading today. Continue Metoprolol tartrate 25 mg PO QID (patient takes 50 mg total at home). INR therapeutic, resume home coumadin. INR goal 2.5-3.5. Continue to monitor on telemetry.   [] CHF secondary to severe MR & TR: Discussed progressive valvular disease with patient, increase Lasix to 40 mg IVP BID. When euvolemic patient should have repeat echo and if valvular disease remains severe then evaluation by structural heart team for valve repair.    Updated primary team. We will continue to follow along.     Gunjan Pruitt MD  Cardiology

## 2023-08-21 NOTE — PROGRESS NOTE ADULT - SUBJECTIVE AND OBJECTIVE BOX
YVETTE VIVAS  [id number]      Chief Complaint: Atrial fibrillation with RVR/CHF/Severe MR/Severe TR    Interval History: The patient reports improvement in breathing today. Denies palpitations.     Tele: atrial fibrillation 110s BPM      Current meds:   acetaminophen     Tablet .. 650 milliGRAM(s) Oral every 6 hours PRN  aspirin enteric coated 81 milliGRAM(s) Oral at bedtime  atorvastatin 20 milliGRAM(s) Oral at bedtime  benzonatate 100 milliGRAM(s) Oral every 8 hours PRN  ezetimibe 10 milliGRAM(s) Oral at bedtime  furosemide   Injectable 40 milliGRAM(s) IV Push <User Schedule>  levothyroxine 88 MICROGram(s) Oral daily  meclizine 12.5 milliGRAM(s) Oral every 8 hours PRN  methyl salicylate 15%/menthol 10% Topical Cream 1 Application(s) Topical four times a day  metoprolol tartrate 25 milliGRAM(s) Oral four times a day  warfarin 3 milliGRAM(s) Oral once      Objective:     Vital Signs:   T(C): 36.7 (08-21-23 @ 05:18), Max: 36.7 (08-20-23 @ 12:04)  HR: 113 (08-21-23 @ 05:18) (94 - 113)  BP: 97/63 (08-21-23 @ 05:18) (97/63 - 117/77)  RR: 18 (08-21-23 @ 05:18) (18 - 18)  SpO2: 96% (08-21-23 @ 05:18) (96% - 98%)    Physical Exam:   General: no distress  HEENT: sclera anicteric  Neck: supple  CVS: JVP ~ 7 cm H20, irregularly irregular, s1, s2, no murmurs/rubs/gallops  Chest: unlabored respirations  Abdomen: non-distended  Extremities: no lower extremity edema b/l  Neuro: awake, alert & oriented  Psych: normal affect      Labs:   21 Aug 2023 07:09    139    |  100    |  23     ----------------------------<  98     4.5     |  29     |  1.28     Ca    10.0       20 Aug 2023 16:00  Mg     2.1       21 Aug 2023 07:09    TPro  7.5    /  Alb  3.2    /  TBili  1.9    /  DBili  x      /  AST  55     /  ALT  43     /  AlkPhos  75     21 Aug 2023 07:09                          12.0   8.98  )-----------( 215      ( 21 Aug 2023 07:09 )             36.1     PT/INR - ( 21 Aug 2023 07:25 )   PT: 30.4 sec;   INR: 2.67 ratio         PTT - ( 19 Aug 2023 12:06 )  PTT:38.9 sec      Outpatient TTE (2021):  LVEF 69%  Normal RV size and function   LA dilation   Mechanical AVR  Mild MR, Mild mitral stenosis, Moderate TR  No pericardial effusion     TTE (8/20/23):   1. Normal global left ventricular systolic function.   2. Left ventricular ejection fraction, by visual estimation, is 60 to 65%.   3. Abnormal septal motion consistent with post-operative status.   4. There is mild septal left ventricular hypertrophy.   5. The mitral in-flow pattern reveals no discernable A-wave, therefore no comment on diastolic function can be made.   6. Left ventricle chordal calcification.   7. Normal right ventricular size and function.   8. Left atrial enlargement.   9. Right atrial enlargement.  10. Degenerative mitral valve.  11. Mild mitral annular calcification.  12. Moderate thickening and calcification of the anterior and posterior mitral valve leaflets.  13. Severe mitral valve regurgitation.  14. Borderline elevated mitral valve mean gradient 5 mmHg at  BPM.  15. Severe tricuspid regurgitation.  16. There is a mechanical valve in the aortic position, appears well   seated with peak velocity within normal limits.  17. Moderate pleural effusion in both left and right lateral regions.  18. Estimated pulmonary artery systolic pressure is 50.5 mmHg assuming a right atrial pressure of 15 mmHg, which is consistent with moderate pulmonary hypertension.  19. There is no evidence of pericardial effusion.      ECG (8/19/23) 14:32: atrial fibrillation with RVR, lateral ST depressions (new)     CT Chest (8/19/23):  Mildly loculated moderate right pleural effusion. Small layering left   pleural effusion. Atelectasis/consolidation of right middle lobe, basilar right lower lobe and anterobasilar left lower lobe.

## 2023-08-22 NOTE — PROGRESS NOTE ADULT - SUBJECTIVE AND OBJECTIVE BOX
Patient is a 88y old  Female who presents with a chief complaint of Acute hypoxic respiratory failure (21 Aug 2023 12:05)    Patient seen and examined at bedside. No acute overnight events.     ALLERGIES:  penicillin (Rash)  sulfa drugs (Rash)    MEDICATIONS  (STANDING):  aspirin enteric coated 81 milliGRAM(s) Oral at bedtime  atorvastatin 20 milliGRAM(s) Oral at bedtime  ezetimibe 10 milliGRAM(s) Oral at bedtime  furosemide   Injectable 20 milliGRAM(s) IV Push once  levothyroxine 88 MICROGram(s) Oral daily  metoprolol tartrate 25 milliGRAM(s) Oral four times a day  warfarin 3 milliGRAM(s) Oral once    MEDICATIONS  (PRN):  acetaminophen     Tablet .. 650 milliGRAM(s) Oral every 6 hours PRN Mild Pain (1 - 3)  benzonatate 100 milliGRAM(s) Oral every 8 hours PRN Cough  meclizine 12.5 milliGRAM(s) Oral every 8 hours PRN for dizziness  methyl salicylate 15%/menthol 10% Topical Cream 1 Application(s) Topical three times a day PRN leg pain    Vital Signs Last 24 Hrs  T(F): 98.1 (22 Aug 2023 12:11), Max: 98.1 (22 Aug 2023 12:11)  HR: 71 (22 Aug 2023 12:11) (71 - 94)  BP: 123/74 (22 Aug 2023 12:11) (88/58 - 123/74)  RR: 18 (22 Aug 2023 12:11) (18 - 18)  SpO2: 98% (22 Aug 2023 12:11) (94% - 98%)    I&O's Summary    22 Aug 2023 07:01  -  22 Aug 2023 15:05  --------------------------------------------------------  IN: 120 mL / OUT: 0 mL / NET: 120 mL      BMI (kg/m2): 21.4 (08-19-23 @ 11:19)    PHYSICAL EXAM:  General: NAD, lying in bed  Eyes: Anicteric  ENT: Moist mucous membranes  Neck: Supple, No JVD  Lungs: Clear to auscultation bilaterally, normal respiratory effort  Cardio: IRRR, S1/S2  Abdomen: Soft, Nontender, Nondistended; Bowel sounds present  Extremities: No calf tenderness, No pitting edema (improved), no digital cyanosis  Psych: A&O x 3, follows commands    LABS:                        12.0   9.30  )-----------( 218      ( 22 Aug 2023 05:29 )             37.1       08-22    138  |  102  |  24  ----------------------------<  94  4.7   |  31  |  1.36    Ca    9.4      22 Aug 2023 05:29  Mg     2.0     08-22    TPro  6.8  /  Alb  3.0  /  TBili  1.7  /  DBili  x   /  AST  58  /  ALT  49  /  AlkPhos  79  08-22       PT/INR - ( 22 Aug 2023 05:20 )   PT: 19.6 sec;   INR: 1.75 ratio              CARDIAC MARKERS ( 20 Aug 2023 08:42 )  x     / 7.1 ng/L / x     / x     / x      CARDIAC MARKERS ( 20 Aug 2023 07:05 )  x     / 85.1 ng/L / x     / x     / x      CARDIAC MARKERS ( 19 Aug 2023 17:25 )  x     / 97.9 ng/L / x     / x     / x            TSH 9.724   TSH with FT4 reflex --  Total T3 57                  Urinalysis Basic - ( 22 Aug 2023 05:29 )    Color: x / Appearance: x / SG: x / pH: x  Gluc: 94 mg/dL / Ketone: x  / Bili: x / Urobili: x   Blood: x / Protein: x / Nitrite: x   Leuk Esterase: x / RBC: x / WBC x   Sq Epi: x / Non Sq Epi: x / Bacteria: x        Culture - Blood (collected 19 Aug 2023 13:07)  Source: .Blood Blood-Peripheral  Preliminary Report (21 Aug 2023 22:02):    No growth at 48 Hours    Culture - Blood (collected 19 Aug 2023 13:07)  Source: .Blood Blood-Peripheral  Preliminary Report (21 Aug 2023 22:02):    No growth at 48 Hours          RADIOLOGY & ADDITIONAL TESTS:   Personally reviewed.     Consultant(s) Notes Reviewed:  [x] YES  [ ] NO    Care Discussed with Consultants/Other Providers:

## 2023-08-22 NOTE — PROGRESS NOTE ADULT - ASSESSMENT
Assessment:  Samantha Gillespie is an 88 year old woman, follows with cardiologist Dr. Jamie Alonzo with past medical history of Paroxysmal atrial fibrillation (on coumadin), Mechanical Aortic valve replacement ([facility]), Coronary artery disease, Hypertension & Hypothyroidism presents with increasing fatigue, orthopnea and dyspnea on exertion over the past several weeks, found to have recurrent atrial fibrillation and congestive heart failure secondary to severe mitral and tricuspid regurgitation.     ECG on admission consistent with atrial fibrillation with RVR and lateral ST depressions, the ST depressions are new and likely demand ischemia from CHF. Troponins mildly elevated and have peaked, likely demand ischemia from AF and CHF. Pro BNP elevated. CT chest consistent with bilateral pleural effusions.     Recommendations:  [] Atrial fibrillation with RVR, Mechanical AVR: S/p digoxin loading with improvement in HR. Digoxin level elevated, hold further digoxin. Patient not receiving Metoprolol, adjust holding parameters. INR goal 2.5-3.5, if INR remains subtherapeutic then start heparin drip. Patient may benefit from FAREED/DCCV, discussed with patient.   [] CHF secondary to severe MR & TR: Discussed progressive valvular disease with patient, continue IV diuresis (adjust holding parameters so patient receives diuretic). When euvolemic patient should have repeat echo and if valvular disease remains severe then evaluation by structural heart team for valve repair, likely can be done as outpatient, will discuss with her cardiologist, Dr. Jamie Alonzo.    We will continue to follow along.     Gunjan Pruitt MD  Cardiology      Assessment:  Samantha Gillespie is an 88 year old woman, follows with cardiologist Dr. Jamie Alonzo with past medical history of Paroxysmal atrial fibrillation (on coumadin), Mechanical Aortic valve replacement ([facility]), Coronary artery disease, Hypertension & Hypothyroidism presents with increasing fatigue, orthopnea and dyspnea on exertion over the past several weeks, found to have recurrent atrial fibrillation and congestive heart failure secondary to severe mitral and tricuspid regurgitation.     ECG on admission consistent with atrial fibrillation with RVR and lateral ST depressions, the ST depressions are new and likely demand ischemia from CHF. Troponins mildly elevated and have peaked, likely demand ischemia from AF and CHF. Pro BNP elevated. CT chest consistent with bilateral pleural effusions.     Recommendations:  [] Atrial fibrillation with RVR, Mechanical AVR: S/p digoxin loading with improvement in HR. Digoxin level elevated, hold further digoxin. Patient not receiving Metoprolol, adjust holding parameters. INR goal 2.5-3.5, if INR remains subtherapeutic then start heparin drip, continue coumadin. Patient may benefit from FAREED/DCCV, discussed with patient.   [] CHF secondary to severe MR & TR: Discussed progressive valvular disease with patient, continue IV diuresis (adjust holding parameters so patient receives diuretic). When euvolemic patient should have repeat echo and if valvular disease remains severe then evaluation by structural heart team for valve repair, likely can be done as outpatient, will discuss with her cardiologist, Dr. Jamie Alonzo.    We will continue to follow along.     Gunjan Pruitt MD  Cardiology      Assessment:  Samantha Gillespie is an 88 year old woman, follows with cardiologist Dr. Jamie Alonzo with past medical history of Paroxysmal atrial fibrillation (on coumadin), Mechanical Aortic valve replacement ([facility]), Coronary artery disease, Hypertension & Hypothyroidism presents with increasing fatigue, orthopnea and dyspnea on exertion over the past several weeks, found to have recurrent atrial fibrillation and congestive heart failure secondary to severe mitral and tricuspid regurgitation.     ECG on admission consistent with atrial fibrillation with RVR and lateral ST depressions, the ST depressions are new and likely demand ischemia from CHF. Troponins mildly elevated and have peaked, likely demand ischemia from AF and CHF. Pro BNP elevated. CT chest consistent with bilateral pleural effusions.     Recommendations:  [] Atrial fibrillation with RVR, Mechanical AVR: S/p digoxin loading with improvement in HR. Digoxin level elevated, hold further digoxin. Patient not receiving Metoprolol, adjust holding parameters. INR goal 2.5-3.5, if INR remains subtherapeutic then start heparin drip, continue coumadin. Patient may benefit from FAREED/DCCV, discussed with patient.   [] CHF secondary to severe MR & TR: Discussed progressive valvular disease with patient, continue IV diuresis (adjust holding parameters so patient receives diuretic). When euvolemic patient should have repeat echo and if valvular disease remains severe then evaluation by structural heart team for valve repair, likely can be done as outpatient, will discuss with her cardiologist, Dr. Jamie Alonzo.    We will continue to follow along.     Addendum:  Discussed case with patient's cardiologist, Dr. Alonzo who agrees with plan as per above. Discussed with patient's daughter and all questions answered, she would like a formal evaluation for repair of mitral and tricuspid valves, paged Dr. Garcia at [facility], he is aware and will callback.     Gunjan Pruitt MD  Cardiology

## 2023-08-22 NOTE — PROGRESS NOTE ADULT - SUBJECTIVE AND OBJECTIVE BOX
YVETTE VIVAS  [id number]        Chief Complaint: Atrial fibrillation with RVR/CHF/Severe MR/Severe TR    Interval History: The patient reports breathing is improving.     Tele: atrial fibrillation 80s BPM      Current meds:   acetaminophen     Tablet .. 650 milliGRAM(s) Oral every 6 hours PRN  aspirin enteric coated 81 milliGRAM(s) Oral at bedtime  atorvastatin 20 milliGRAM(s) Oral at bedtime  benzonatate 100 milliGRAM(s) Oral every 8 hours PRN  ezetimibe 10 milliGRAM(s) Oral at bedtime  furosemide   Injectable 40 milliGRAM(s) IV Push two times a day  levothyroxine 88 MICROGram(s) Oral daily  meclizine 12.5 milliGRAM(s) Oral every 8 hours PRN  methyl salicylate 15%/menthol 10% Topical Cream 1 Application(s) Topical three times a day PRN  metoprolol tartrate 25 milliGRAM(s) Oral four times a day  warfarin 3 milliGRAM(s) Oral once      Objective:     Vital Signs:  T(C): 36.7 (08-21-23 @ 20:27), Max: 36.7 (08-21-23 @ 15:28)  HR: 94 (08-22-23 @ 01:36) (78 - 94)  BP: 108/61 (08-22-23 @ 07:07) (88/58 - 108/67)  RR: 18 (08-22-23 @ 07:07) (18 - 18)  SpO2: 94% (08-22-23 @ 07:07) (94% - 96%)    Physical Exam:   General: no distress  HEENT: sclera anicteric  Neck: supple  CVS: JVP ~ 7 cm H20, irregularly irregular, s1, s2, no murmurs/rubs/gallops  Chest: unlabored respirations  Abdomen: non-distended  Extremities: no lower extremity edema b/l  Neuro: awake, alert & oriented  Psych: normal affect    Labs:   22 Aug 2023 05:29    138    |  102    |  24     ----------------------------<  94     4.7     |  31     |  1.36     Ca    9.4        22 Aug 2023 05:29  Mg     2.0       22 Aug 2023 05:29    TPro  6.8    /  Alb  3.0    /  TBili  1.7    /  DBili  x      /  AST  58     /  ALT  49     /  AlkPhos  79     22 Aug 2023 05:29                          12.0   9.30  )-----------( 218      ( 22 Aug 2023 05:29 )             37.1     PT/INR - ( 22 Aug 2023 05:20 )   PT: 19.6 sec;   INR: 1.75 ratio            Outpatient TTE (2021):  LVEF 69%  Normal RV size and function   LA dilation   Mechanical AVR  Mild MR, Mild mitral stenosis, Moderate TR  No pericardial effusion     TTE (8/20/23):   1. Normal global left ventricular systolic function.   2. Left ventricular ejection fraction, by visual estimation, is 60 to 65%.   3. Abnormal septal motion consistent with post-operative status.   4. There is mild septal left ventricular hypertrophy.   5. The mitral in-flow pattern reveals no discernable A-wave, therefore no comment on diastolic function can be made.   6. Left ventricle chordal calcification.   7. Normal right ventricular size and function.   8. Left atrial enlargement.   9. Right atrial enlargement.  10. Degenerative mitral valve.  11. Mild mitral annular calcification.  12. Moderate thickening and calcification of the anterior and posterior mitral valve leaflets.  13. Severe mitral valve regurgitation.  14. Borderline elevated mitral valve mean gradient 5 mmHg at  BPM.  15. Severe tricuspid regurgitation.  16. There is a mechanical valve in the aortic position, appears well   seated with peak velocity within normal limits.  17. Moderate pleural effusion in both left and right lateral regions.  18. Estimated pulmonary artery systolic pressure is 50.5 mmHg assuming a right atrial pressure of 15 mmHg, which is consistent with moderate pulmonary hypertension.  19. There is no evidence of pericardial effusion.      ECG (8/19/23) 14:32: atrial fibrillation with RVR, lateral ST depressions (new)     CT Chest (8/19/23):  Mildly loculated moderate right pleural effusion. Small layering left   pleural effusion. Atelectasis/consolidation of right middle lobe, basilar right lower lobe and anterobasilar left lower lobe.

## 2023-08-22 NOTE — PROGRESS NOTE ADULT - ASSESSMENT
88 year old female with past medical history of A.fib and Mechanical AVR ([facility]) on Coumadin, CAD, HTN, HLD, hypothyroidism, history of cholelithiasis/acute cholecystitis (managed conservatively 8/2022), presented from home with few week history of worsening shortness of breath, bilateral LE swelling and orthopnea. Labs notable for elevated troponin, BNP and CXR showing bilateral pleural effusion (R>L).    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  Bilateral Pleural Effusion (R>L)  Elevated Troponin  -Trop likely demand ischemia in setting of rapid A.fib and CHF, downtrended  -Echo noted with normal LV systolic function, EF 60-65%, severe MR and TR, moderate pulm HTN  -CT showed mildly loculated moderate right pleural effusion, small layering left pleural effusion, atelectasis/consolidation of right middle lobe, basilar right lower lobe and anterobasilar left lower lobe  -Repeat CXR 8/21 with improvement  -Lasix 40mg IVP increased from daily to BID 8/21 however BP did not tolerate - switched to daily for now  -Strict in's and out's, daily weights  -O2 supplementation for O2 Sat >92%, titrating down as tolerated  -Incentive spirometer  -Cardiology Dr. Pruitt consulted, recs appreciated  -Pulm Dr. Zaman consulted given loculated effusion  -PT eval    A.fib with RVR  Mechanical AVR  -Switched to Metoprolol tartrate 25 mg PO QID, takes 50 mg at home  -INR now subtherapeutic  -Restarted Coumadin at 3 mg 8/21, will use heparin gtt until INR at goal 2.5 - 3.5    CAD/HTN/HLD  -Continue aspirin, metoprolol, atorvastatin for crestor while inpatient, zetia  -Echo as above    Low Grade Fever/Chills/Cough  -Respiratory panel/COVID negative  -CT chest findings noted above  -Supportive care: tessalon perles PRN  -Monitor for fevers, symptoms    Hypothyroidism  -Per [employer] 5/10/23 last TSH 2.27, free T4 1.3  -TSH elevated in setting of acute illness  -Continue home dose levothyroxine 88 mcg for now  -Repeat TFTs after discharge with PCP    Elevated Bilirubin with likely component of congestive hepatopathy  History of Acute Cholecystitis  -RUQ u/s showed cholelithiasis without evidence of cholecystitis or biliary obstruction, patient is asymptomatic  -Elevated total bili appears chronic, improved with diuresis  -Monitor for now    Leg Cramping, resolved  -Acute on chronic in setting of IV diuresis, ?2/2 electrolytes  -Bilateral LE venous dopplers negative for DVT  -K and Mg repleted 8/20  -Warm compresses, stretching  -Flexeril PRN at family's request    Vertigo  -Meclizine PRN    Likely CKD  -Baseline Cr ~1.2 range  -Close monitoring of renal function while on diuretics  -Avoid nephrotoxic agents    DVT PPx  -Coumadin resumed    Advance Directive  -Full Code    8/22: Plan discussed with patient, her daughter (Sandrita Delgadillo, [phone]) and cardiologist Dr. Pruitt.

## 2023-08-22 NOTE — PHYSICAL THERAPY INITIAL EVALUATION ADULT - ADDITIONAL COMMENTS
pt lives alone in private house, 3 re w/rail, 1 flt to basement. pt independent w/ambulation and ADL's, +

## 2023-08-23 NOTE — CONSULT NOTE ADULT - SUBJECTIVE AND OBJECTIVE BOX
PULMONARY CONSULT  Location of Patient :  3EST E348 W1 ( 3EST)  Attending requesting Consult:Erlin Long  Chief Complaint :     Reason For consult :      Initial HPI on admission:  HPI:  88 year old female with past medical  A.fib and Mechanical AVR ([facility]) on coumadin, CAD, HTN/HLD, hypothyroidism, history of cholelithiasis/acute cholecystitis (managed conservatively 8/2022), who presents from home with few week history of worsening shortness of breath.  Patient lives at home alone and is independent at baseline. She endorses few week history of worsening shortness of breath, bilateral feet swelling and orthopnea. She denies chest pain but noted chest tightness and palpitation with shortness of breath. No fever but has had chills. Dry cough since today. No sick contact.    In the ED, she had low grade tempt of 99.4, -115, BP stable, O2Sat 87% and placed on 3L NC,  Labs notable for IN 4.4, Trop 70.7 -> 83.1, BUN/Cr 16/1.32, totali bili 2.3, BNP 1990.  EKG on presentation initially showed sinus tachycardia with , non-specific ST-T wave changes. She is currently in A.fib.  CXR showed Moderate right and small left pleural effusions.    She was given levaquin, lasix 20mg IVP x 1 and metoprolol 25mg PO QD   (19 Aug 2023 15:39)      BRIEF HOSPITAL COURSE: ***    PAST MEDICAL & SURGICAL HISTORY:  S/P AVR  Atrial fibrillation  Hypothyroidism  History of appendectomy  S/P AVR    Allergies    penicillin (Rash)  sulfa drugs (Rash)    Intolerances      FAMILY HISTORY:    Social history:        Smoking:     Drinking:     Drug use:    Review of Systems: as stated above    CONSTITUTIONAL: No fever, No chills, No fatigue  EYES: No eye pain, No visual disturbances, No discharge  ENMT:  No difficulty hearing, No tinnitus, No vertigo; No sinus or throat pain  NECK: No pain, No stiffness  RESPIRATORY: No Cough, No SOB, No Secretions  CARDIOVASCULAR: No chest pain, No palpitations, No dizziness, or No leg swelling  GASTROINTESTINAL: No abdominal or epigastric pain. No nausea, No vomiting, No hematemesis; No diarrhea, No constipation. No melena, No hematochezia.  GENITOURINARY: No dysuria, No frequency, No hematuria, No incontinence  NEUROLOGICAL: No headaches, No memory loss, No loss of strength, No numbness, No tremors  SKIN: No itching, No burning, No rashes, No lesions   MUSCULOSKELETAL: No joint pain or swelling; No muscle, back, No extremity pain  PSYCHIATRIC: No depression, No anxiety, No mood swings, No difficulty sleeping      Medications:  MEDICATIONS  (STANDING):  AQUAPHOR (petrolatum Ointment) 1 Application(s) Topical two times a day  aspirin enteric coated 81 milliGRAM(s) Oral at bedtime  atorvastatin 20 milliGRAM(s) Oral at bedtime  ezetimibe 10 milliGRAM(s) Oral at bedtime  furosemide   Injectable 40 milliGRAM(s) IV Push <User Schedule>  heparin  Infusion. 900 Unit(s)/Hr (9 mL/Hr) IV Continuous <Continuous>  levothyroxine 88 MICROGram(s) Oral daily  metoprolol succinate ER 25 milliGRAM(s) Oral daily  warfarin 3 milliGRAM(s) Oral once    MEDICATIONS  (PRN):  acetaminophen     Tablet .. 650 milliGRAM(s) Oral every 6 hours PRN Mild Pain (1 - 3)  benzonatate 100 milliGRAM(s) Oral every 8 hours PRN Cough  cyclobenzaprine 5 milliGRAM(s) Oral at bedtime PRN Muscle Spasm  heparin   Injectable 4500 Unit(s) IV Push every 6 hours PRN For aPTT less than 40  heparin   Injectable 2000 Unit(s) IV Push every 6 hours PRN For aPTT between 40 - 57  meclizine 12.5 milliGRAM(s) Oral every 8 hours PRN for dizziness  methyl salicylate 15%/menthol 10% Topical Cream 1 Application(s) Topical three times a day PRN leg pain  sodium chloride 0.65% Nasal 1 Spray(s) Both Nostrils three times a day PRN Nasal Congestion/Dryness      Antibiotics History  levoFLOXacin IVPB 500 milliGRAM(s) IV Intermittent once, 08-19-23 @ 12:43      Heme Medications   aspirin enteric coated 81 milliGRAM(s) Oral at bedtime, 08-20-23 @ 00:00  heparin   Injectable 2000 Unit(s) IV Push every 6 hours, 08-22-23 @ 15:17 PRN  heparin   Injectable 4500 Unit(s) IV Push every 6 hours, 08-22-23 @ 15:17 PRN  heparin  Infusion. 900 Unit(s)/Hr IV Continuous <Continuous>, 08-23-23 @ 03:02  warfarin 3 milliGRAM(s) Oral once, 08-23-23 @ 11:46      GI Medications        Home Medications:  Last Order Reconciliation Date: 08-19-23 @ 15:45 (Admission Reconciliation)  aspirin 81 mg oral tablet: 1 tab(s) orally once a day (08-02-22 @ 23:45)  Crestor 20 mg oral tablet: 1 tab(s) orally once a day (08-02-22 @ 23:45)  levothyroxine 88 mcg (0.088 mg) oral tablet: 1 tab(s) orally once a day (08-04-22 @ 12:29)  meclizine 12.5 mg oral tablet: 1 orally once a day as needed for  dizziness (08-19-23 @ 15:43)  metoprolol succinate 50 mg oral tablet, extended release: 1 tab(s) orally once a day (08-04-22 @ 12:29)  warfarin 3 mg oral tablet: 1 orally alternating 2.5mg and 3mg (08-19-23 @ 15:42)  Zetia 10 mg oral tablet: 1 tab(s) orally once a day (08-02-22 @ 23:45)      LABS:                        12.6   9.27  )-----------( 218      ( 23 Aug 2023 06:53 )             38.0     08-23    138  |  99  |  18  ----------------------------<  102<H>  4.0   |  32<H>  |  1.16    Ca    9.6      23 Aug 2023 06:53  Mg     2.0     08-23    TPro  7.6  /  Alb  3.0<L>  /  TBili  2.3<H>  /  DBili  x   /  AST  42<H>  /  ALT  41  /  AlkPhos  78  08-23         CULTURES: (if applicable)    Culture - Blood (collected 08-19-23 @ 13:07)  Source: .Blood Blood-Peripheral  Preliminary Report (08-22-23 @ 22:01):    No growth at 72 Hours    Culture - Blood (collected 08-19-23 @ 13:07)  Source: .Blood Blood-Peripheral  Preliminary Report (08-22-23 @ 22:02):    No growth at 72 Hours      Rapid RVP Result: NotDetec (08-19-23 @ 16:00)        CAPILLARY BLOOD GLUCOSE          RADIOLOGY  CXR:      CT:    < from: CT Chest No Cont (08.19.23 @ 17:22) >  ACC: [id number] EXAM:  CT CHEST   ORDERED BY:  KEO CLEMENTE     PROCEDURE DATE:  08/19/2023      FINDINGS:    AIRWAYS, LUNGS, PLEURA: Patent central airways. Mildly loculated moderate right pleural effusion. Small layering left pleural effusion. Atelectasis/consolidation of right middle lobe, basilarright lower lobe   and anterobasilar left lower lobe.    LYMPH NODES, MEDIASTINUM: Mildly enlarged 1.4 cm short axis right lower paratracheal lymph node.    HEART, VESSELS: Cardiomegaly. Status post aortic valve replacement No pericardial effusion. Aortic and coronary calcifications. Thoracic aorta normal in diameter. Severe calcification at the orifice of the left subclavian artery.    VISUALIZED UPPER ABDOMEN: Severe aortic calcification.    CHEST WALL, BONES: No aggressive osseous lesion. Healed sternotomy.    LOWER NECK: Within normal limits.    IMPRESSION:    Mildly loculated moderate right pleural effusion. Small layering left pleural effusion. Atelectasis/consolidation of right middle lobe, basilar right lower lobe and anterobasilar left lower lobe.    --- End of Report ---      < end of copied text >  ECHO:  < from: TTE Echo Complete w/o Contrast w/ Doppler (08.20.23 @ 10:34) >    Summary:   1. Normal global left ventricular systolic function.   2. Left ventricular ejection fraction, by visual estimation, is 60 to 65%.   3. Abnormal septal motion consistent with post-operative status.   4. There is mild septal left ventricular hypertrophy.   5. The mitral in-flow pattern reveals no discernable A-wave, therefore no comment on diastolic function can be made.   6. Left ventricle chordal calcification.   7. Normal right ventricular size and function.   8. Left atrial enlargement.   9. Right atrial enlargement.  10. Degenerative mitral valve.  11. Mild mitral annular calcification.  12. Moderate thickening and calcification of the anterior and posterior mitral valve leaflets.  13. Severe mitral valve regurgitation.  14. Borderline elevated mitral valve mean gradient  5 mmHg at  BPM.  15. Severe tricuspid regurgitation.  16. There is a mechanical valve in the aortic position, appears well seated with peak velocity within normal limits.  17. Moderate pleural effusion in both left and right lateral regions.  18. Estimated pulmonary artery systolic pressure is 50.5 mmHg assuming a right atrial pressureof 15 mmHg, which is consistent with moderate pulmonary hypertension.  19. There is no evidence of pericardial effusion.      < end of copied text >      VITALS:  T(C): 36.6 (08-23-23 @ 12:20), Max: 37.2 (08-22-23 @ 17:00)  T(F): 97.8 (08-23-23 @ 12:20), Max: 98.9 (08-22-23 @ 17:00)  HR: 69 (08-23-23 @ 12:20) (67 - 86)  BP: 114/68 (08-23-23 @ 12:20) (96/55 - 114/68)  BP(mean): --  ABP: --  ABP(mean): --  RR: 18 (08-23-23 @ 12:20) (17 - 18)  SpO2: 99% (08-23-23 @ 12:20) (93% - 99%)  CVP(mm Hg): --  CVP(cm H2O): --    Ins and Outs     08-22-23 @ 07:01  -  08-23-23 @ 07:00  --------------------------------------------------------  IN: 142 mL / OUT: 1000 mL / NET: -858 mL    08-23-23 @ 07:01  -  08-23-23 @ 15:41  --------------------------------------------------------  IN: 240 mL / OUT: 600 mL / NET: -360 mL        I&O's Detail    22 Aug 2023 07:01  -  23 Aug 2023 07:00  --------------------------------------------------------  IN:    Heparin Infusion: 22 mL    Oral Fluid: 120 mL  Total IN: 142 mL    OUT:    Voided (mL): 1000 mL  Total OUT: 1000 mL    Total NET: -858 mL      23 Aug 2023 07:01  -  23 Aug 2023 15:41  --------------------------------------------------------  IN:    Oral Fluid: 240 mL  Total IN: 240 mL    OUT:    Voided (mL): 600 mL  Total OUT: 600 mL    Total NET: -360 mL    PULMONARY CONSULT  Location of Patient :  [facility] E348 W1 ([facility])  Attending requesting Consult:Erlin Long        Initial HPI on admission:  HPI:  88 year old female with past medical  A.fib and Mechanical AVR ([facility]) on coumadin, CAD, HTN/HLD, hypothyroidism, history of cholelithiasis/acute cholecystitis (managed conservatively 8/2022), who presents from home with few week history of worsening shortness of breath.  Patient lives at home alone and is independent at baseline. She endorses few week history of worsening shortness of breath, bilateral feet swelling and orthopnea. She denies chest pain but noted chest tightness and palpitation with shortness of breath. No fever but has had chills. Dry cough since today. No sick contact.    In the ED, she had low grade tempt of 99.4, -115, BP stable, O2Sat 87% and placed on 3L NC,  Labs notable for IN 4.4, Trop 70.7 -> 83.1, BUN/Cr 16/1.32, totali bili 2.3, BNP 1990.  EKG on presentation initially showed sinus tachycardia with , non-specific ST-T wave changes. She is currently in A.fib.  CXR showed Moderate right and small left pleural effusions.    She was given levaquin, lasix 20mg IVP x 1 and metoprolol 25mg PO QD   (19 Aug 2023 15:39)      BRIEF HOSPITAL COURSE:   Patiient seen and examined today  patient states feeling better since here less sob, no cp, sob, cough ,secretions  LE Swelling much improved.  CT results and plan reviewed with patient and dtr louise       PAST MEDICAL & SURGICAL HISTORY:  S/P AVR  Atrial fibrillation  Hypothyroidism  History of appendectomy  S/P AVR    Allergies    penicillin (Rash)  sulfa drugs (Rash)    Intolerances      FAMILY HISTORY: denies    Social history:      Smoking: never smoker     Drinking: denies     Drug use: deneis    Review of Systems: as stated above    CONSTITUTIONAL: No fever, No chills, +fatigue  EYES: No eye pain, No visual disturbances, No discharge  ENMT:  No difficulty hearing, No tinnitus, No vertigo; No sinus or throat pain  NECK: No pain, No stiffness  RESPIRATORY: +Cough, +SOB, No Secretions  CARDIOVASCULAR: No chest pain, No palpitations, No dizziness, or +leg swelling  GASTROINTESTINAL: No abdominal or epigastric pain. No nausea, No vomiting, No hematemesis; No diarrhea, No constipation. No melena, No hematochezia.  GENITOURINARY: No dysuria, No frequency, No hematuria, No incontinenceMUSCULOSKELETAL: No joint pain or swelling; No muscle, back, No extremity pain  PSYCHIATRIC: No depression, No anxiety, No mood swings, No difficulty sleeping      Medications:  MEDICATIONS  (STANDING):  AQUAPHOR (petrolatum Ointment) 1 Application(s) Topical two times a day  aspirin enteric coated 81 milliGRAM(s) Oral at bedtime  atorvastatin 20 milliGRAM(s) Oral at bedtime  ezetimibe 10 milliGRAM(s) Oral at bedtime  furosemide   Injectable 40 milliGRAM(s) IV Push <User Schedule>  heparin  Infusion. 900 Unit(s)/Hr (9 mL/Hr) IV Continuous <Continuous>  levothyroxine 88 MICROGram(s) Oral daily  metoprolol succinate ER 25 milliGRAM(s) Oral daily  warfarin 3 milliGRAM(s) Oral once    MEDICATIONS  (PRN):  acetaminophen     Tablet .. 650 milliGRAM(s) Oral every 6 hours PRN Mild Pain (1 - 3)  benzonatate 100 milliGRAM(s) Oral every 8 hours PRN Cough  cyclobenzaprine 5 milliGRAM(s) Oral at bedtime PRN Muscle Spasm  heparin   Injectable 4500 Unit(s) IV Push every 6 hours PRN For aPTT less than 40  heparin   Injectable 2000 Unit(s) IV Push every 6 hours PRN For aPTT between 40 - 57  meclizine 12.5 milliGRAM(s) Oral every 8 hours PRN for dizziness  methyl salicylate 15%/menthol 10% Topical Cream 1 Application(s) Topical three times a day PRN leg pain  sodium chloride 0.65% Nasal 1 Spray(s) Both Nostrils three times a day PRN Nasal Congestion/Dryness      Antibiotics History  levoFLOXacin IVPB 500 milliGRAM(s) IV Intermittent once, 08-19-23 @ 12:43      Heme Medications   aspirin enteric coated 81 milliGRAM(s) Oral at bedtime, 08-20-23 @ 00:00  heparin   Injectable 2000 Unit(s) IV Push every 6 hours, 08-22-23 @ 15:17 PRN  heparin   Injectable 4500 Unit(s) IV Push every 6 hours, 08-22-23 @ 15:17 PRN  heparin  Infusion. 900 Unit(s)/Hr IV Continuous <Continuous>, 08-23-23 @ 03:02  warfarin 3 milliGRAM(s) Oral once, 08-23-23 @ 11:46           Home Medications:  Last Order Reconciliation Date: 08-19-23 @ 15:45 (Admission Reconciliation)  aspirin 81 mg oral tablet: 1 tab(s) orally once a day (08-02-22 @ 23:45)  Crestor 20 mg oral tablet: 1 tab(s) orally once a day (08-02-22 @ 23:45)  levothyroxine 88 mcg (0.088 mg) oral tablet: 1 tab(s) orally once a day (08-04-22 @ 12:29)  meclizine 12.5 mg oral tablet: 1 orally once a day as needed for  dizziness (08-19-23 @ 15:43)  metoprolol succinate 50 mg oral tablet, extended release: 1 tab(s) orally once a day (08-04-22 @ 12:29)  warfarin 3 mg oral tablet: 1 orally alternating 2.5mg and 3mg (08-19-23 @ 15:42)  Zetia 10 mg oral tablet: 1 tab(s) orally once a day (08-02-22 @ 23:45)      LABS:                        12.6   9.27  )-----------( 218      ( 23 Aug 2023 06:53 )             38.0     08-23    138  |  99  |  18  ----------------------------<  102<H>  4.0   |  32<H>  |  1.16    Ca    9.6      23 Aug 2023 06:53  Mg     2.0     08-23    TPro  7.6  /  Alb  3.0<L>  /  TBili  2.3<H>  /  DBili  x   /  AST  42<H>  /  ALT  41  /  AlkPhos  78  08-23         CULTURES: (if applicable)    Culture - Blood (collected 08-19-23 @ 13:07)  Source: .Blood Blood-Peripheral  Preliminary Report (08-22-23 @ 22:01):    No growth at 72 Hours    Culture - Blood (collected 08-19-23 @ 13:07)  Source: .Blood Blood-Peripheral  Preliminary Report (08-22-23 @ 22:02):    No growth at 72 Hours      Rapid RVP Result: NotDetec (08-19-23 @ 16:00)       RADIOLOGY  CXR:      CT:    < from: CT Chest No Cont (08.19.23 @ 17:22) >  ACC: [id number] EXAM:  CT CHEST   ORDERED BY:  KEO CLEMENTE     PROCEDURE DATE:  08/19/2023      FINDINGS:    AIRWAYS, LUNGS, PLEURA: Patent central airways. Mildly loculated moderate right pleural effusion. Small layering left pleural effusion. Atelectasis/consolidation of right middle lobe, basilarright lower lobe   and anterobasilar left lower lobe.    LYMPH NODES, MEDIASTINUM: Mildly enlarged 1.4 cm short axis right lower paratracheal lymph node.    HEART, VESSELS: Cardiomegaly. Status post aortic valve replacement No pericardial effusion. Aortic and coronary calcifications. Thoracic aorta normal in diameter. Severe calcification at the orifice of the left subclavian artery.    VISUALIZED UPPER ABDOMEN: Severe aortic calcification.    CHEST WALL, BONES: No aggressive osseous lesion. Healed sternotomy.    LOWER NECK: Within normal limits.    IMPRESSION:    Mildly loculated moderate right pleural effusion. Small layering left pleural effusion. Atelectasis/consolidation of right middle lobe, basilar right lower lobe and anterobasilar left lower lobe.    --- End of Report ---      < end of copied text >  ECHO:  < from: TTE Echo Complete w/o Contrast w/ Doppler (08.20.23 @ 10:34) >    Summary:   1. Normal global left ventricular systolic function.   2. Left ventricular ejection fraction, by visual estimation, is 60 to 65%.   3. Abnormal septal motion consistent with post-operative status.   4. There is mild septal left ventricular hypertrophy.   5. The mitral in-flow pattern reveals no discernable A-wave, therefore no comment on diastolic function can be made.   6. Left ventricle chordal calcification.   7. Normal right ventricular size and function.   8. Left atrial enlargement.   9. Right atrial enlargement.  10. Degenerative mitral valve.  11. Mild mitral annular calcification.  12. Moderate thickening and calcification of the anterior and posterior mitral valve leaflets.  13. Severe mitral valve regurgitation.  14. Borderline elevated mitral valve mean gradient  5 mmHg at  BPM.  15. Severe tricuspid regurgitation.  16. There is a mechanical valve in the aortic position, appears well seated with peak velocity within normal limits.  17. Moderate pleural effusion in both left and right lateral regions.  18. Estimated pulmonary artery systolic pressure is 50.5 mmHg assuming a right atrial pressureof 15 mmHg, which is consistent with moderate pulmonary hypertension.  19. There is no evidence of pericardial effusion.      < end of copied text >      VITALS:  T(C): 36.6 (08-23-23 @ 12:20), Max: 37.2 (08-22-23 @ 17:00)  T(F): 97.8 (08-23-23 @ 12:20), Max: 98.9 (08-22-23 @ 17:00)  HR: 69 (08-23-23 @ 12:20) (67 - 86)  BP: 114/68 (08-23-23 @ 12:20) (96/55 - 114/68)  BP(mean): --  ABP: --  ABP(mean): --  RR: 18 (08-23-23 @ 12:20) (17 - 18)  SpO2: 99% (08-23-23 @ 12:20) (93% - 99%)  CVP(mm Hg): --  CVP(cm H2O): --    Ins and Outs     08-22-23 @ 07:01  -  08-23-23 @ 07:00  --------------------------------------------------------  IN: 142 mL / OUT: 1000 mL / NET: -858 mL    08-23-23 @ 07:01  -  08-23-23 @ 15:41  --------------------------------------------------------  IN: 240 mL / OUT: 600 mL / NET: -360 mL        I&O's Detail    22 Aug 2023 07:01  -  23 Aug 2023 07:00  --------------------------------------------------------  IN:    Heparin Infusion: 22 mL    Oral Fluid: 120 mL  Total IN: 142 mL    OUT:    Voided (mL): 1000 mL  Total OUT: 1000 mL    Total NET: -858 mL      23 Aug 2023 07:01  -  23 Aug 2023 15:41  --------------------------------------------------------  IN:    Oral Fluid: 240 mL  Total IN: 240 mL    OUT:    Voided (mL): 600 mL  Total OUT: 600 mL    Total NET: -360 mL      Physical Examination:  GENERAL:               Alert, Oriented, No acute distress.    HEENT:                   No JVD, Moist MM  PULM:                     Bilateral air entry, Clear to auscultation bilaterally, no significant sputum production, +Rales, No Rhonchi, No Wheezing  CVS:                         S1, S2,  + Murmur  ABD:                        Soft, nondistended, nontender, normoactive bowel sounds,   EXT:                         No edema, nontender, No Cyanosis or Clubbing    SKIN:                       Warm and well perfused, no rashes noted.   NEURO:                  Alert, oriented, interactive, nonfocal, follows commands  PSYC:                      Calm, + Insight.

## 2023-08-23 NOTE — PROGRESS NOTE ADULT - ASSESSMENT
Assessment:  Samantha Gillespie is an 88 year old woman, follows with cardiologist Dr. Jamie Alonzo with past medical history of Paroxysmal atrial fibrillation (on coumadin), Mechanical Aortic valve replacement ([facility]), Coronary artery disease, Hypertension & Hypothyroidism presents with increasing fatigue, orthopnea and dyspnea on exertion over the past several weeks, found to have recurrent atrial fibrillation and congestive heart failure secondary to severe mitral and tricuspid regurgitation.     ECG on admission consistent with atrial fibrillation with RVR and lateral ST depressions, the ST depressions are new and likely demand ischemia from CHF. Troponins mildly elevated and have peaked, likely demand ischemia from AF and CHF. Pro BNP elevated. CT chest consistent with bilateral pleural effusions.     Recommendations:  [] Atrial fibrillation with RVR, Mechanical AVR: S/p digoxin loading with improvement in HR. Digoxin level elevated, hold further digoxin. May transition to Metoprolol succinate 50 mg daily. INR goal 2.5-3.5, INR subtherapeutic and patient started on heparin drip, continue coumadin. Patient may benefit from FAREED/DCCV, discussed with patient.   [] CHF secondary to severe MR & TR: Discussed progressive valvular disease with patient, continue IV diuresis, improving. Discussed case with patient's cardiologist, Dr. Alonzo who agrees with plan as per above. Discussed with patient's daughter and all questions answered, she would like a formal evaluation for repair of mitral and tricuspid valves, paged Dr. Garcia at [facility], he is aware and will callback.     Discussed with Dr. Long. Will sign out this case to cardiologist to follow along tomorrow.    Gunjan Pruitt MD  Cardiology

## 2023-08-23 NOTE — PROGRESS NOTE ADULT - ASSESSMENT
88 year old female with past medical history of A.fib and Mechanical AVR ([facility]) on Coumadin, CAD, HTN, HLD, hypothyroidism, history of cholelithiasis/acute cholecystitis (managed conservatively 8/2022), presented from home with few week history of worsening shortness of breath, bilateral LE swelling and orthopnea. Labs notable for elevated troponin, BNP and CXR showing bilateral pleural effusion (R>L).    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  Bilateral Pleural Effusion (R>L)  Elevated Troponin  -Trop likely demand ischemia in setting of rapid A.fib and CHF, downtrended  -Echo noted with normal LV systolic function, EF 60-65%, severe MR and TR, moderate pulm HTN  -CT showed mildly loculated moderate right pleural effusion, small layering left pleural effusion, atelectasis/consolidation of right middle lobe, basilar right lower lobe and anterobasilar left lower lobe  -Repeat CXR 8/21 with improvement  -Lasix 40mg IVP increased from daily to BID 8/21 however BP did not tolerate - switched to daily for now  -Strict in's and out's, daily weights  -O2 supplementation for O2 Sat >92%, titrated off to RA  -Incentive spirometer  -Cardiology Dr. Pruitt consulted, recs appreciated  -Pulm Dr. Zaman consulted given loculated effusion    A.fib with RVR  Mechanical AVR  -Initially switched to Metoprolol tartrate 25 mg PO QID, takes 50 mg at home. 8/23 changed to Metoprolol succinate 25 mg once daily, to be uptitrated as able  -INR now subtherapeutic  -Restarted Coumadin at 3 mg 8/21, will use heparin gtt until INR at goal 2.5 - 3.5    CAD/HTN/HLD  -Continue aspirin, metoprolol, atorvastatin for crestor while inpatient, zetia  -Echo as above    Low Grade Fever/Chills/Cough  -Respiratory panel/COVID negative  -CT chest findings noted above  -Supportive care: tessalon perles PRN  -Monitor for fevers, symptoms    Hypothyroidism  -Per [employer] 5/10/23 last TSH 2.27, free T4 1.3  -TSH elevated in setting of acute illness  -Continue home dose levothyroxine 88 mcg for now  -Repeat TFTs after discharge with PCP    Elevated Bilirubin with likely component of congestive hepatopathy  History of Acute Cholecystitis  -RUQ u/s showed cholelithiasis without evidence of cholecystitis or biliary obstruction, patient is asymptomatic  -Elevated total bili appears chronic, improved with diuresis  -Monitor for now    Leg Cramping, resolved  -Acute on chronic in setting of IV diuresis, ?2/2 electrolytes  -Bilateral LE venous dopplers negative for DVT  -K and Mg repleted 8/20  -Warm compresses, stretching  -Flexeril PRN at family's request    Vertigo  -Meclizine PRN    Likely CKD  -Baseline Cr ~1.2 range  -Close monitoring of renal function while on diuretics  -Avoid nephrotoxic agents    DVT PPx  -Coumadin resumed    Advance Directive  -Full Code    Dispo  -Awaiting interventional cardio recs regarding transfer for valve repair vs. discharge home with plan for outpatient follow up. PT recommended home PT.    8/22: Plan discussed with patient, her daughter (Sandrita Delgadillo, [phone]) and cardiologist Dr. Pruitt. 88 year old female with past medical history of A.fib and Mechanical AVR ([facility]) on Coumadin, CAD, HTN, HLD, hypothyroidism, history of cholelithiasis/acute cholecystitis (managed conservatively 8/2022), presented from home with few week history of worsening shortness of breath, bilateral LE swelling and orthopnea. Labs notable for elevated troponin, BNP and CXR showing bilateral pleural effusion (R>L).    Acute Hypoxic Respiratory Failure  Acute CHF 2/2 Severe MR/TR  Bilateral Pleural Effusion (R>L)  Elevated Troponin  -Trop likely demand ischemia in setting of rapid A.fib and CHF, downtrended  -Echo with normal LV systolic function, EF 60-65%, severe MR and TR, moderate pulm HTN  -CT showed mildly loculated moderate right pleural effusion, small layering left pleural effusion, atelectasis/consolidation of right middle lobe, basilar right lower lobe and anterobasilar left lower lobe; mildly enlarged R lower paratracheal lymph node  -Repeat CXR 8/21 with improvement  -Lasix 40mg IVP increased from daily to BID 8/21 however BP did not tolerate - switched to daily for now  -Strict in's and out's, daily weights  -O2 supplementation for O2 Sat >92%, titrated off to RA  -Incentive spirometer  -Cardiology Dr. Pruitt consulted, recs appreciated  -Pulm Dr. Zaman consulted, recs appreciated    A.fib with RVR  Mechanical AVR  -Initially switched to Metoprolol tartrate 25 mg PO QID, takes 50 mg at home. 8/23 changed to Metoprolol succinate 25 mg once daily, to be uptitrated as able  -INR now subtherapeutic  -Restarted Coumadin at 3 mg 8/21, will use heparin gtt until INR at goal 2.5 - 3.5    CAD/HTN/HLD  -Continue aspirin, metoprolol, atorvastatin for crestor while inpatient, zetia  -Echo as above    Low Grade Fever/Chills/Cough  -Respiratory panel/COVID negative  -CT chest findings noted above  -Supportive care: tessalon perles PRN, Robitussin  -Monitor for fevers, symptoms    Hypothyroidism  -Per [employer] 5/10/23 last TSH 2.27, free T4 1.3  -TSH elevated in setting of acute illness  -Continue home dose levothyroxine 88 mcg for now  -Repeat TFTs after discharge with PCP    Elevated Bilirubin with likely component of congestive hepatopathy  History of Acute Cholecystitis  -RUQ u/s showed cholelithiasis without evidence of cholecystitis or biliary obstruction, patient is asymptomatic  -Elevated total bili appears chronic, improved with diuresis  -Monitor for now    Leg Cramping, resolved  -Acute on chronic in setting of IV diuresis, ?2/2 electrolytes  -Bilateral LE venous dopplers negative for DVT  -K and Mg repleted 8/20  -Warm compresses, stretching  -Flexeril PRN at family's request    Vertigo  -Meclizine PRN    Likely CKD  -Baseline Cr ~1.2 range  -Close monitoring of renal function while on diuretics  -Avoid nephrotoxic agents    DVT PPx  -Coumadin resumed    Advance Directive  -Full Code    Dispo  -Awaiting [facility] interventional cardiologist Dr. Garcia's recs regarding transfer for valve repair vs. discharge home with plan for outpatient follow up. Repeat CXR tomorrow, awaiting further pulm recs regarding effusion with lymph node.   -PT recommended home PT, reassessment today - daughter concerned about patient's deconditioning. Prior to hospitalization, she was independent with ambulation and getting around, living alone and driving.     8/23: Plan discussed with patient, her daughter (Sandrita Delgadillo, [phone]), cardiologist Dr. Pruitt and pulmonologist Dr. Zaman. 88 year old female with past medical history of A.fib and Mechanical AVR ([facility]) on Coumadin, CAD, HTN, HLD, hypothyroidism, history of cholelithiasis/acute cholecystitis (managed conservatively 8/2022), presented from home with few week history of worsening shortness of breath, bilateral LE swelling and orthopnea. Labs notable for elevated troponin, BNP and CXR showing bilateral pleural effusion (R>L).    Acute Hypoxic Respiratory Failure  Acute CHF 2/2 Severe MR/TR  Bilateral Pleural Effusion (R>L)  Elevated Troponin  -Trop likely demand ischemia in setting of rapid A.fib and CHF, downtrended  -Echo with normal LV systolic function, EF 60-65%, severe MR and TR, moderate pulm HTN  -CT showed mildly loculated moderate right pleural effusion, small layering left pleural effusion, atelectasis/consolidation of right middle lobe, basilar right lower lobe and anterobasilar left lower lobe; mildly enlarged R lower paratracheal lymph node  -Repeat CXR 8/21 with improvement  -Lasix 40mg IVP increased from daily to BID 8/21 however BP did not tolerate - switched to daily for now  -Strict in's and out's, daily weights  -O2 supplementation for O2 Sat >92%, titrated off to RA  -Incentive spirometer  -Cardiology Dr. Pruitt consulted, recs appreciated  -Pulm Dr. Zaman consulted, recs appreciated    A.fib with RVR  Mechanical AVR  -Initially switched to Metoprolol tartrate 25 mg PO QID, takes 50 mg at home. 8/23 changed to Metoprolol succinate 25 mg once daily, to be uptitrated as able  -s/p digoxin load 8/21. Elevated dig level discussed with cardio, recommended repeating levels in 2 days - ordered for 8/24  -INR now subtherapeutic  -Restarted Coumadin at 3 mg 8/21, will use heparin gtt until INR at goal 2.5 - 3.5    CAD/HTN/HLD  -Continue aspirin, metoprolol, atorvastatin for crestor while inpatient, zetia  -Echo as above    Low Grade Fever/Chills/Cough  -Respiratory panel/COVID negative  -CT chest findings noted above  -Supportive care: tessalon perles PRN, Robitussin  -Monitor for fevers, symptoms    Hypothyroidism  -Per [employer] 5/10/23 last TSH 2.27, free T4 1.3  -TSH elevated in setting of acute illness  -Continue home dose levothyroxine 88 mcg for now  -Repeat TFTs after discharge with PCP    Elevated Bilirubin with likely component of congestive hepatopathy  History of Acute Cholecystitis  -RUQ u/s showed cholelithiasis without evidence of cholecystitis or biliary obstruction, patient is asymptomatic  -Elevated total bili appears chronic, improved with diuresis  -Monitor for now    Leg Cramping, resolved  -Acute on chronic in setting of IV diuresis, ?2/2 electrolytes  -Bilateral LE venous dopplers negative for DVT  -K and Mg repleted 8/20  -Warm compresses, stretching  -Flexeril PRN at family's request    Vertigo  -Meclizine PRN    Likely CKD  -Baseline Cr ~1.2 range  -Close monitoring of renal function while on diuretics  -Avoid nephrotoxic agents    DVT PPx  -Coumadin resumed    Advance Directive  -Full Code    Dispo  -Awaiting [facility] interventional cardiologist Dr. Garcia's recs regarding transfer for valve repair vs. discharge home with plan for outpatient follow up. Repeat CXR tomorrow, awaiting further pulm recs regarding effusion with lymph node.   -PT recommended home PT, reassessment today - daughter concerned about patient's deconditioning. Prior to hospitalization, she was independent with ambulation and getting around, living alone and driving.     8/23: Plan discussed with patient, her daughter (Sandrita Delgadillo, [phone]), cardiologist Dr. Pruitt and pulmonologist Dr. Zaman.

## 2023-08-23 NOTE — PROGRESS NOTE ADULT - SUBJECTIVE AND OBJECTIVE BOX
Patient is a 88y old  Female who presents with a chief complaint of Acute hypoxic respiratory failure (22 Aug 2023 11:05)    Patient seen and examined at bedside. No acute overnight events. This morning had a minor nosebleed from R nostril, self-resolved.    ALLERGIES:  penicillin (Rash)  sulfa drugs (Rash)    MEDICATIONS  (STANDING):  AQUAPHOR (petrolatum Ointment) 1 Application(s) Topical two times a day  aspirin enteric coated 81 milliGRAM(s) Oral at bedtime  atorvastatin 20 milliGRAM(s) Oral at bedtime  ezetimibe 10 milliGRAM(s) Oral at bedtime  furosemide   Injectable 40 milliGRAM(s) IV Push <User Schedule>  heparin  Infusion. 900 Unit(s)/Hr (9 mL/Hr) IV Continuous <Continuous>  levothyroxine 88 MICROGram(s) Oral daily  metoprolol tartrate 25 milliGRAM(s) Oral four times a day  sodium chloride 0.65% Nasal 2 Spray(s) Both Nostrils once    MEDICATIONS  (PRN):  acetaminophen     Tablet .. 650 milliGRAM(s) Oral every 6 hours PRN Mild Pain (1 - 3)  benzonatate 100 milliGRAM(s) Oral every 8 hours PRN Cough  cyclobenzaprine 5 milliGRAM(s) Oral at bedtime PRN Muscle Spasm  heparin   Injectable 4500 Unit(s) IV Push every 6 hours PRN For aPTT less than 40  heparin   Injectable 2000 Unit(s) IV Push every 6 hours PRN For aPTT between 40 - 57  meclizine 12.5 milliGRAM(s) Oral every 8 hours PRN for dizziness  methyl salicylate 15%/menthol 10% Topical Cream 1 Application(s) Topical three times a day PRN leg pain  sodium chloride 0.65% Nasal 1 Spray(s) Both Nostrils three times a day PRN Nasal Congestion/Dryness    Vital Signs Last 24 Hrs  T(F): 97.5 (23 Aug 2023 04:57), Max: 98.9 (22 Aug 2023 17:00)  HR: 67 (23 Aug 2023 04:57) (67 - 86)  BP: 109/63 (23 Aug 2023 04:57) (96/55 - 123/74)  RR: 18 (23 Aug 2023 04:57) (17 - 18)  SpO2: 93% (23 Aug 2023 04:57) (93% - 98%)    I&O's Summary    22 Aug 2023 07:01  -  23 Aug 2023 07:00  --------------------------------------------------------  IN: 142 mL / OUT: 1000 mL / NET: -858 mL    23 Aug 2023 07:01  -  23 Aug 2023 11:43  --------------------------------------------------------  IN: 120 mL / OUT: 0 mL / NET: 120 mL      BMI (kg/m2): 21.4 (08-19-23 @ 11:19)      PHYSICAL EXAM:  General: NAD, lying in bed  Eyes: Anicteric  ENT: Moist mucous membranes  Neck: Supple, No JVD  Lungs: Clear to auscultation bilaterally, normal respiratory effort  Cardio: IRRR, S1/S2  Abdomen: Soft, Nontender, Nondistended; Bowel sounds present  Extremities: No calf tenderness, No pitting edema, no digital cyanosis  Skin: Warm, dry  Psych: A&O x 3, follows commands    LABS:                        12.6   9.27  )-----------( 218      ( 23 Aug 2023 06:53 )             38.0       08-23    138  |  99  |  18  ----------------------------<  102  4.0   |  32  |  1.16    Ca    9.6      23 Aug 2023 06:53  Mg     2.0     08-23    TPro  7.6  /  Alb  3.0  /  TBili  2.3  /  DBili  x   /  AST  42  /  ALT  41  /  AlkPhos  78  08-23       PT/INR - ( 23 Aug 2023 06:53 )   PT: 19.5 sec;   INR: 1.69 ratio         PTT - ( 23 Aug 2023 06:53 )  PTT:147.5 sec           TSH 9.724   TSH with FT4 reflex --  Total T3 57          Culture - Blood (collected 19 Aug 2023 13:07)  Source: .Blood Blood-Peripheral  Preliminary Report (22 Aug 2023 22:01):    No growth at 72 Hours    Culture - Blood (collected 19 Aug 2023 13:07)  Source: .Blood Blood-Peripheral  Preliminary Report (22 Aug 2023 22:02):    No growth at 72 Hours          RADIOLOGY & ADDITIONAL TESTS:   Personally reviewed.     Consultant(s) Notes Reviewed:  [x] YES  [ ] NO    Care Discussed with Consultants/Other Providers:

## 2023-08-23 NOTE — PROGRESS NOTE ADULT - SUBJECTIVE AND OBJECTIVE BOX
YVETTE VIVAS  [id number]      Chief Complaint: Atrial fibrillation with RVR/CHF/Severe MR/Severe TR    Interval History: The patient reports breathing has improved, now off of oxygen.     Tele: atrial fibrillation/flutter 70s BPM      Current meds:   acetaminophen     Tablet .. 650 milliGRAM(s) Oral every 6 hours PRN  aspirin enteric coated 81 milliGRAM(s) Oral at bedtime  atorvastatin 20 milliGRAM(s) Oral at bedtime  benzonatate 100 milliGRAM(s) Oral every 8 hours PRN  cyclobenzaprine 5 milliGRAM(s) Oral at bedtime PRN  ezetimibe 10 milliGRAM(s) Oral at bedtime  furosemide   Injectable 40 milliGRAM(s) IV Push <User Schedule>  heparin   Injectable 4500 Unit(s) IV Push every 6 hours PRN  heparin   Injectable 2000 Unit(s) IV Push every 6 hours PRN  heparin  Infusion. 900 Unit(s)/Hr IV Continuous <Continuous>  levothyroxine 88 MICROGram(s) Oral daily  meclizine 12.5 milliGRAM(s) Oral every 8 hours PRN  methyl salicylate 15%/menthol 10% Topical Cream 1 Application(s) Topical three times a day PRN  metoprolol tartrate 25 milliGRAM(s) Oral four times a day      Objective:     Vital Signs:   T(C): 36.4 (08-23-23 @ 04:57), Max: 37.2 (08-22-23 @ 17:00)  HR: 67 (08-23-23 @ 04:57) (67 - 86)  BP: 109/63 (08-23-23 @ 04:57) (96/55 - 123/74)  RR: 18 (08-23-23 @ 04:57) (17 - 18)  SpO2: 93% (08-23-23 @ 04:57) (93% - 98%)  Wt(kg): --    Physical Exam:   General: no distress  HEENT: sclera anicteric  Neck: supple  CVS: JVP ~ 7 cm H20, irregularly irregular, s1, s2, no murmurs/rubs/gallops  Chest: unlabored respirations  Abdomen: non-distended  Extremities: no lower extremity edema b/l  Neuro: awake, alert & oriented  Psych: normal affect      Labs:   23 Aug 2023 06:53    138    |  99     |  18     ----------------------------<  102    4.0     |  32     |  1.16     Ca    9.6        23 Aug 2023 06:53  Mg     2.0       23 Aug 2023 06:53    TPro  7.6    /  Alb  3.0    /  TBili  2.3    /  DBili  x      /  AST  42     /  ALT  41     /  AlkPhos  78     23 Aug 2023 06:53                          12.6   9.27  )-----------( 218      ( 23 Aug 2023 06:53 )             38.0     PT/INR - ( 23 Aug 2023 06:53 )   PT: 19.5 sec;   INR: 1.69 ratio         PTT - ( 23 Aug 2023 06:53 )  PTT:147.5 sec        Outpatient TTE (2021):  LVEF 69%  Normal RV size and function   LA dilation   Mechanical AVR  Mild MR, Mild mitral stenosis, Moderate TR  No pericardial effusion     TTE (8/20/23):   1. Normal global left ventricular systolic function.   2. Left ventricular ejection fraction, by visual estimation, is 60 to 65%.   3. Abnormal septal motion consistent with post-operative status.   4. There is mild septal left ventricular hypertrophy.   5. The mitral in-flow pattern reveals no discernable A-wave, therefore no comment on diastolic function can be made.   6. Left ventricle chordal calcification.   7. Normal right ventricular size and function.   8. Left atrial enlargement.   9. Right atrial enlargement.  10. Degenerative mitral valve.  11. Mild mitral annular calcification.  12. Moderate thickening and calcification of the anterior and posterior mitral valve leaflets.  13. Severe mitral valve regurgitation.  14. Borderline elevated mitral valve mean gradient 5 mmHg at  BPM.  15. Severe tricuspid regurgitation.  16. There is a mechanical valve in the aortic position, appears well   seated with peak velocity within normal limits.  17. Moderate pleural effusion in both left and right lateral regions.  18. Estimated pulmonary artery systolic pressure is 50.5 mmHg assuming a right atrial pressure of 15 mmHg, which is consistent with moderate pulmonary hypertension.  19. There is no evidence of pericardial effusion.      ECG (8/19/23) 14:32: atrial fibrillation with RVR, lateral ST depressions (new)     CT Chest (8/19/23):  Mildly loculated moderate right pleural effusion. Small layering left   pleural effusion. Atelectasis/consolidation of right middle lobe, basilar right lower lobe and anterobasilar left lower lobe.

## 2023-08-23 NOTE — CONSULT NOTE ADULT - ASSESSMENT
Assessment:  Samantha Gillespie is an 88 year old woman, follows with cardiologist Dr. Jamie Alonzo with past medical history of Paroxysmal atrial fibrillation (on coumadin), Mechanical Aortic valve replacement ([facility]), Coronary artery disease, Hypertension & Hypothyroidism presents with increasing fatigue, orthopnea and dyspnea on exertion over the past several weeks, found to have recurrent atrial fibrillation and findings suggestive of congestive heart failure.    ECG on admission consistent with atrial fibrillation with RVR and lateral ST depressions, the ST depressions are new and likely demanc ischemia from CHF. Troponins mildly elevated and have peaked, likely demand ischemia from AF and CHF. CXR consistent with bilateral pleural effusions. Pro BNP elevated.     Recommendations:  [] Atrial fibrillation with RVR, Mechanical AVR: S/p metoprolol, appears to be in sinus rhythm. Dose Metoprolol tartrate 25 mg PO QID (patient takes 50 mg total at home). INR supratherapeutic, would hold coumadin tonight and allow INR to drift down, no vitamin K necessary. INR goal 2.5-3.5. Check echo to evaluate LVEF and mechanical AVR. Continue to monitor on telemetry.   [] Possible CHF: Check echo to evaluate LVEF, dose Lasix 40 mg IVP daily.     Discussed with Dr. Long. Discussed with patient and her daughter at bedside. We will continue to follow along.    Gunjan Pruitt MD  Cardiology     
Assessment  1. Acuite on chronic Diastolic CHF due to severe TR and MR  2. VHD with h/o AVR   3. Abnormal CT with     - Mildly enlarged 1.4 cm short axis right lower paratracheal lymph node.    - Mildly loculated moderate right pleural effusion. Small layering left pleural effusion. Atelectasis/consolidation of right middle lobe, basilar right lower lobe and anterobasilar left lower lobe.  4. Afib on a/c - coumadin  5. Underlying Hypothyroidism,     Plan  Patient now on RA and no Respiratory distress  Patient currently on heparin and coumadin for subtherapeutic INR  pt has dry cough, doubt pneumonia will give Robitussin    Discussed with Patient and DTR that b.l pl effusions likely cardiogenic in origin and that her breathing now better with diuretics suggestive of cardiogenic in origin, Discussed having small loculation on CT is a non specific finding in a patient who had open heart surgery, however with having enlarged lymph node and partial loculated pleural effusion together on same side can be suggestive of underlying process's like cancer being present. Probability is small-moderate risk and would need follow up.  Discussed 2 paths forward thoracentesis while here in hospital but will need to be off a/c , and negative cytology on pleural fluid will not rule in / out the risk of cancer in lymph node vs repeat imaging in few weeks  Will plan to repeat CXR in am to see if elusion size is improved and make decision.    will follow  d/w primary team

## 2023-08-23 NOTE — PROVIDER CONTACT NOTE (CRITICAL VALUE NOTIFICATION) - RECOMMENDATIONS
Hold heparin for one hour. Stop heparin at 8:30. Restart heparin at 9:30. Decrease by 2ml as per order.

## 2023-08-24 NOTE — DISCHARGE NOTE PROVIDER - HOSPITAL COURSE
Hospital Course  88F with PMH a fib and Mechanical AVR ([facility]) on Coumadin, CAD, HTN, HLD, hypothyroidism, history of cholelithiasis/acute cholecystitis (managed conservatively 8/2022), presented from home with few week history of worsening shortness of breath, bilateral LE swelling and orthopnea. Labs notable for elevated troponin, BNP and CXR showing bilateral pleural effusion (R>L).  Acute Hypoxic Respiratory Failure  Acute CHF 2/2 Severe MR/TR  Bilateral Pleural Effusion (R>L)  Elevated Troponin  -Trop likely demand ischemia in setting of rapid a fib and CHF, downtrended  -Echo with normal LV systolic function, EF 60-65%, severe MR and TR, moderate pulm HTN  -CT showed mildly loculated moderate right pleural effusion, small layering left pleural effusion, atelectasis/consolidation of right middle lobe, basilar right lower lobe and anterobasilar left lower lobe; mildly enlarged R lower paratracheal lymph node  -Lasix 40mg IVP increased from daily to BID 8/21 however BP did not tolerate - switched to daily for now  -Strict in's and out's, daily weights  -Cardiology Dr. Pruitt consulted, recs appreciated  -Pulm Dr. Zaman consulted, recs appreciated    A fib with RVR  Mechanical AVR  -Initially switched to Metoprolol tartrate 25 mg PO QID, takes 50 mg at home. 8/23 changed to Metoprolol succinate 25 mg once daily, to be uptitrated as able  -s/p digoxin load 8/21. Elevated dig level discussed with cardio, recommended repeating levels in 2 days. - restart Digoxin level now therapeutic   -INR remains subtherapeutic - Coumadin 5mg today with heparin gtt until INR at goal 2.5 - 3.5    CAD/HTN/HLD  -Continue aspirin, metoprolol, atorvastatin for crestor while inpatient, zetia  -Echo as above    Dispo: transfer to [facility] for further management. Dr Dasilva accepts    Daughter Sandrita Delgadlilo, [phone]      Discharging Provider:  Pricilla Lopez DO  Contact Info: Cell [phone] Please call with any questions or concerns.      Time spent: 49 minutes.    Hospital Course  88F with PMH AFib and Mechanical AVR ([facility]) on Coumadin, CAD, HTN, HLD, hypothyroidism, history of cholelithiasis/acute cholecystitis (managed conservatively 8/2022), presented from home with few week history of worsening shortness of breath, bilateral LE swelling and orthopnea. Labs notable for elevated troponin, BNP and CXR showing bilateral pleural effusion (R>L).    #Acute Hypoxic Respiratory Failure  #Acute CHF 2/2 Severe MR/TR  #Bilateral Pleural Effusion (R>L)  #Elevated Troponin  -Trop likely demand ischemia in setting of rapid a fib and CHF, downtrended  -Echo with normal LV systolic function, EF 60-65%, severe MR and TR, moderate pulm HTN  -CT showed mildly loculated moderate right pleural effusion, small layering left pleural effusion, atelectasis/consolidation of right middle lobe, basilar right lower lobe and anterobasilar left lower lobe; mildly enlarged R lower paratracheal lymph node  -Lasix 40mg IVP increased from daily to BID 8/21 however BP did not tolerate - switched to daily for now  -Strict in's and out's, daily weights  -Cardiology Dr. Pruitt consulted, recs appreciated  -Pulm Dr. Zaman consulted, recs appreciated    #A fib with RVR  #Mechanical AVR  -Initially switched to Metoprolol tartrate 25 mg PO QID, takes 50 mg at home. 8/23 changed to Metoprolol succinate 25 mg once daily, to be uptitrated as able  -s/p digoxin load 8/21. Elevated dig level discussed with cardio, recommended repeating levels in 2 days. - restart Digoxin level now therapeutic   -INR remains subtherapeutic - Coumadin 5mg today with heparin gtt until INR at goal 2.5 - 3.5    #CAD/HTN/HLD  -Continue aspirin, metoprolol, atorvastatin for crestor while inpatient, zetia  -Echo as above    Dispo: transfer to [facility] for further management. Dr Dasilva accepts    Daughter Sandrita Delgadillo, [phone]      Discharging Provider:  Pricilla Lopez DO  Contact Info: Cell [phone] Please call with any questions or concerns.      Time spent: 49 minutes.

## 2023-08-24 NOTE — PROGRESS NOTE ADULT - ASSESSMENT
88F with PMH a fib and Mechanical AVR ([facility]) on Coumadin, CAD, HTN, HLD, hypothyroidism, history of cholelithiasis/acute cholecystitis (managed conservatively 8/2022), presented from home with few week history of worsening shortness of breath, bilateral LE swelling and orthopnea. Labs notable for elevated troponin, BNP and CXR showing bilateral pleural effusion (R>L).    Acute Hypoxic Respiratory Failure  Acute CHF 2/2 Severe MR/TR  Bilateral Pleural Effusion (R>L)  Elevated Troponin  -Trop likely demand ischemia in setting of rapid a fib and CHF, downtrended  -Echo with normal LV systolic function, EF 60-65%, severe MR and TR, moderate pulm HTN  -CT showed mildly loculated moderate right pleural effusion, small layering left pleural effusion, atelectasis/consolidation of right middle lobe, basilar right lower lobe and anterobasilar left lower lobe; mildly enlarged R lower paratracheal lymph node  -Repeat CXR 8/21 with improvement  -Lasix 40mg IVP increased from daily to BID 8/21 however BP did not tolerate - switched to daily for now  -Strict in's and out's, daily weights  -O2 supplementation for O2 Sat >92%, titrated off to RA  -Incentive spirometer  -Cardiology Dr. Pruitt consulted, recs appreciated  -Pulm Dr. Zaman consulted, recs appreciated    A fib with RVR  Mechanical AVR  -Initially switched to Metoprolol tartrate 25 mg PO QID, takes 50 mg at home. 8/23 changed to Metoprolol succinate 25 mg once daily, to be uptitrated as able  -s/p digoxin load 8/21. Elevated dig level discussed with cardio, recommended repeating levels in 2 days. - restart Digoxin level now therapeutic   -INR remains subtherapeutic - Coumadin 5mg today with heparin gtt until INR at goal 2.5 - 3.5    CAD/HTN/HLD  -Continue aspirin, metoprolol, atorvastatin for crestor while inpatient, zetia  -Echo as above    Low Grade Fever/Chills/Cough  -Respiratory panel/COVID negative  -CT chest findings noted above  -Supportive care: tessalon perles PRN, Robitussin  -Monitor for fevers, symptoms    Hypothyroidism  -Per [employer] 5/10/23 last TSH 2.27, free T4 1.3  -TSH elevated in setting of acute illness  -Continue home dose levothyroxine 88 mcg for now  -Repeat TFTs after discharge with PCP    Elevated Bilirubin with likely component of congestive hepatopathy  History of Acute Cholecystitis  -RUQ u/s showed cholelithiasis without evidence of cholecystitis or biliary obstruction, patient is asymptomatic  -Elevated total bili appears chronic, improved with diuresis  -Monitor for now    Leg Cramping, resolved  -Acute on chronic in setting of IV diuresis, ?2/2 electrolytes  -Bilateral LE venous dopplers negative for DVT  -K and Mg repleted 8/20  -Warm compresses, stretching  -Flexeril PRN at family's request    Vertigo  -Meclizine PRN    Likely CKD  -Baseline Cr ~1.2 range  -Close monitoring of renal function while on diuretics  -Avoid nephrotoxic agents    DVT PPx  -Coumadin resumed    Advance Directive  -Full Code    Dispo: transfer to [facility] for further management. Dr Dasilva accepts    Left message with daughter Sandrita Delgadillo, [phone] awaiting call back

## 2023-08-24 NOTE — DISCHARGE NOTE PROVIDER - NSDCCPCAREPLAN_GEN_ALL_CORE_FT
PRINCIPAL DISCHARGE DIAGNOSIS  Diagnosis: Acute CHF  Assessment and Plan of Treatment: Transfer to [facility] for continued management

## 2023-08-24 NOTE — PROGRESS NOTE ADULT - ASSESSMENT
Physical Examination:  GENERAL:               Alert, Oriented, No acute distress.    HEENT:                   No JVD, Moist MM  PULM:                     Bilateral air entry, Clear to auscultation bilaterally, no significant sputum production, +Rales, No Rhonchi, No Wheezing  CVS:                         S1, S2,  + Murmur  ABD:                        Soft, nondistended, nontender, normoactive bowel sounds,   EXT:                         No edema, nontender, No Cyanosis or Clubbing    SKIN:                       Warm and well perfused, no rashes noted.   NEURO:                  Alert, oriented, interactive, nonfocal, follows commands  PSYC:                      Calm, + Insight.      Assessment  1. Acuite on chronic Diastolic CHF due to severe TR and MR  2. VHD with h/o AVR   3. Abnormal CT with     - Mildly enlarged 1.4 cm short axis right lower paratracheal lymph node.    - Mildly loculated moderate right pleural effusion. Small layering left pleural effusion. Atelectasis/consolidation of right middle lobe, basilar right lower lobe and anterobasilar left lower lobe.  4. Afib on a/c - coumadin  5. Underlying Hypothyroidism,     Plan  Patient now on RA and no Respiratory distress  Patient currently on heparin and coumadin for subtherapeutic INR  pt has dry cough, doubt pneumonia will give Robitussin    on 8/23 Discussed with Patient and DTR that b.l pl effusions likely cardiogenic in origin and that her breathing now better with diuretics suggestive of cardiogenic in origin, Discussed having small loculation on CT is a non specific finding in a patient who had open heart surgery, however with having enlarged lymph node and partial loculated pleural effusion together on same side can be suggestive of underlying process's like cancer being present. Probability is small-moderate risk and would need follow up.  Discussed 2 paths forward thoracentesis while here in hospital but will need to be off a/c , and negative cytology on pleural fluid will not rule in / out the risk of cancer in lymph node vs repeat imaging in few weeks    Today d/w patient and dtr plan to be transferred to [facility] for cardiac evaluation not interested in diagnostic thoracentesis here, will plan to get pulm evaluation in [facility]      d/w primary team

## 2023-08-24 NOTE — DISCHARGE NOTE PROVIDER - NSDCFUSCHEDAPPT_GEN_ALL_CORE_FT
Ej Torres Physician [address]  Scheduled Appointment: 09/06/2023    Misty Mayorga [facility]  OBGYNGEN [address] [place] [address]  Scheduled Appointment: 09/13/2023    Arthur Cagle  [facility]  ORTHOSURG [address] [place] [address]  Scheduled Appointment: 09/22/2023

## 2023-08-24 NOTE — DISCHARGE NOTE PROVIDER - NSDCCAREPROVSEEN_GEN_ALL_CORE_FT
Jessica, Pricilla Woody, Essence Figueroa, Ashia Pruitt, [facility], Alfredo Dodson, Jackson Velarde, Jason Borges, Juliette Zaman, Oumar Kohli, Rich Claudio, Jodee Pricilla Lopez

## 2023-08-24 NOTE — DISCHARGE NOTE PROVIDER - NSDCMRMEDTOKEN_GEN_ALL_CORE_FT
aspirin 81 mg oral tablet: 1 tab(s) orally once a day  Crestor 20 mg oral tablet: 1 tab(s) orally once a day  levothyroxine 88 mcg (0.088 mg) oral tablet: 1 tab(s) orally once a day  meclizine 12.5 mg oral tablet: 1 orally once a day as needed for  dizziness  metoprolol succinate 50 mg oral tablet, extended release: 1 tab(s) orally once a day  warfarin 3 mg oral tablet: 1 orally alternating 2.5mg and 3mg  Zetia 10 mg oral tablet: 1 tab(s) orally once a day   aspirin 81 mg oral tablet: 1 tab(s) orally once a day  atorvastatin 20 mg oral tablet: 1 tab(s) orally once a day (at bedtime)  digoxin 62.5 mcg (0.0625 mg) oral tablet: 1 tab(s) orally once a day  levothyroxine 88 mcg (0.088 mg) oral tablet: 1 tab(s) orally once a day  meclizine 12.5 mg oral tablet: 1 orally once a day as needed for  dizziness  metoprolol succinate 50 mg oral tablet, extended release: 1 tab(s) orally once a day  Zetia 10 mg oral tablet: 1 tab(s) orally once a day

## 2023-08-24 NOTE — PROGRESS NOTE ADULT - SUBJECTIVE AND OBJECTIVE BOX
Follow up for :chf af avr mr    SUBJ:  breathing improved, no c/p palpitations    PMH  S/P AVR mechanical    Atrial fibrillation    Hypothyroidism        MEDICATIONS  (STANDING):  aspirin enteric coated 81 milliGRAM(s) Oral at bedtime  atorvastatin 20 milliGRAM(s) Oral at bedtime  ezetimibe 10 milliGRAM(s) Oral at bedtime  furosemide   Injectable 40 milliGRAM(s) IV Push <User Schedule>  heparin  Infusion. 900 Unit(s)/Hr (9 mL/Hr) IV Continuous <Continuous>  levothyroxine 88 MICROGram(s) Oral daily  metoprolol succinate ER 25 milliGRAM(s) Oral daily  polyethylene glycol 3350 17 Gram(s) Oral two times a day  senna 2 Tablet(s) Oral at bedtime    MEDICATIONS  (PRN):  acetaminophen     Tablet .. 650 milliGRAM(s) Oral every 6 hours PRN Mild Pain (1 - 3)  benzonatate 100 milliGRAM(s) Oral every 8 hours PRN Cough  cyclobenzaprine 5 milliGRAM(s) Oral at bedtime PRN Muscle Spasm  guaiFENesin Oral Liquid (Sugar-Free) 200 milliGRAM(s) Oral every 6 hours PRN Cough  heparin   Injectable 2000 Unit(s) IV Push every 6 hours PRN For aPTT between 40 - 57  heparin   Injectable 4500 Unit(s) IV Push every 6 hours PRN For aPTT less than 40  meclizine 12.5 milliGRAM(s) Oral every 8 hours PRN for dizziness  methyl salicylate 15%/menthol 10% Topical Cream 1 Application(s) Topical three times a day PRN leg pain  sodium chloride 0.65% Nasal 1 Spray(s) Both Nostrils three times a day PRN Nasal Congestion/Dryness        PHYSICAL EXAM:  Vital Signs Last 24 Hrs  T(C): 36.8 (24 Aug 2023 05:08), Max: 36.8 (24 Aug 2023 05:08)  T(F): 98.2 (24 Aug 2023 05:08), Max: 98.2 (24 Aug 2023 05:08)  HR: 89 (24 Aug 2023 05:08) (63 - 98)  BP: 114/74 (24 Aug 2023 05:08) (102/68 - 116/74)  BP(mean): --  RR: 16 (24 Aug 2023 05:08) (16 - 18)  SpO2: 97% (24 Aug 2023 05:08) (94% - 99%)    Parameters below as of 24 Aug 2023 05:08  Patient On (Oxygen Delivery Method): room air        GENERAL: NAD, well-groomed, well-developed  HEAD:  Atraumatic, Normocephalic  EYES:  conjunctiva and sclera clear  ENT: Moist mucous membranes,  NECK: Supple, No JVD, no bruits  CHEST/LUNG: Clear to auscultation bilaterally; No rales, rhonchi, wheezing, or rubs  HEART:  slightly irregular good quality s2  ABDOMEN: Soft, Nontender, Nondistended; Bowel sounds present  EXTREMITIES:   No clubbing, cyanosis, or edema  SKIN: No rashes or lesions  NERVOUS SYSTEM:  Alert       TELEMETRY:  atrial flutter controlled vr    ECG:  < from: 12 Lead ECG (23 @ 14:32) >    Ventricular Rate 105 BPM    QRS Duration 78 ms    Q-T Interval 358 ms    QTC Calculation(Bazett) 473 ms    R Axis 13 degrees    T Axis -10 degrees    Diagnosis Line Atrial fibrillation with rapid ventricular response  Low voltage QRS  Lateral ST depressions, consider ischemia  Abnormal ECG  When compared with ECG of 19-AUG-2023 11:35,  Lateral ST depressions are now present  Confirmed by Saad Pruitt ([id number]) on 2023 7:33:38 AM    < end of copied text >      LABS:                        12.4   9.15  )-----------( 211      ( 24 Aug 2023 07:35 )             37.1     08-    135  |  96  |  19  ----------------------------<  103<H>  3.7   |  31  |  1.16    Ca    9.4      24 Aug 2023 07:35  Mg     1.9     08-    TPro  7.2  /  Alb  3.0<L>  /  TBili  2.1<H>  /  DBili  x   /  AST  38  /  ALT  31  /  AlkPhos  78  08-24          PT/INR - ( 24 Aug 2023 07:35 )   PT: 19.8 sec;   INR: 1.77 ratio         PTT - ( 24 Aug 2023 07:35 )  PTT:104.0 sec    I&O's Summary    23 Aug 2023 07:01  -  24 Aug 2023 07:00  --------------------------------------------------------  IN: 275 mL / OUT: 800 mL / NET: -525 mL    24 Aug 2023 07:01  -  24 Aug 2023 10:35  --------------------------------------------------------  IN: 240 mL / OUT: 0 mL / NET: 240 mL          RADIOLOGY & ADDITIONAL STUDIES:  < from: Xray Chest 1 View- PORTABLE-Urgent (Xray Chest 1 View- PORTABLE-Urgent .) (23 @ 10:43) >    ACC: [id number] EXAM:  XR CHEST PORTABLE URGENT 1V   ORDERED BY: LESLIE SEVERINO     PROCEDURE DATE:  2023          INTERPRETATION:  TIME OF EXAM: 2023 at 10:18 AM and 10:20 AM.    CLINICAL INFORMATION: History of hypoxia. Valveabnormality. Assess right   pleural effusion.    COMPARISON:  CT scan of the chest from 2023.    TECHNIQUE:   AP Portable chest x-ray.    INTERPRETATION:    Heart size and the mediastinum cannot be accurately evaluated on this   projection. Median sternotomy sutures, surgical clips, and aortic valve   replacement are seen. Calcified tortuous aorta.  Continued medial right lower lung opacity.  A small right pleural effusion appears slightly decreased in size versus   redistribution due to different patient position. There is likely   associated passive atelectasis.  There is left lower lung linear atelectasis.  There is a trace left pleural effusion which is also less conspicuous.  No pneumothorax is seen.  There is osteoarthritic degenerative change of the spine.        IMPRESSION: Continued medial right lower lung opacity which could be due   to atelectasis or pneumonia.    Small right pleural effusion and trace left pleural effusion both of   which may be slightly decreased in size or merely redistributed due to   different patient position. Likely associated passive atelectasis.    Left lower lung linear atelectasis.    --- End of Report ---            NICOLASA FAGAN MD; Attending Radiologist  This document has been electronically signed. Aug 22 2023 12:19PM    < end of copied text >  < from: TTE Echo Complete w/o Contrast w/ Doppler (23 @ 10:34) >    ACC: [id number] EXAM:  ECHO TTE WO CON COMP W DOPP                          PROCEDURE DATE:  2023          INTERPRETATION:  TRANSTHORACIC ECHOCARDIOGRAM REPORT        Patient Name:   YVETTE VIVAS Patient Location: [address] [place] [address] Rec #:  [id number]     Accession #:      [id number]  Account #:      [id number]        Height:           63.8 in 162.0 cm  YOB: 1935      Weight:           125.0 lb 56.70 kg  Patient Age:    88 years      BSA:              1.60 m²  Patient Gender: F  BP:               120/60 mmHg      Date of Exam:        2023 10:34:19 AM  Sonographer:         GENOVEVA  Referring Physician: BRYN    Procedure:     2D Echo/Doppler/Color Doppler Complete.  Indications:   I50.9 - Heart failure, unspecified  Diagnosis:     I27.20 - Pulmonary hypertension, unspecified  Study Details: Technically fair study.        2D AND M-MODE MEASUREMENTS (normal ranges within parentheses):  Left Ventricle:                  Normal   Aorta/Left Atrium:               Normal  IVSd (2D):              1.05 cm (0.7-1.1) Aortic Root (Mmode): 2.50 cm   (2.4-3.7)  LVPWd (2D):             0.85 cm (0.7-1.1) Left Atrium (Mmode): 4.06 cm   (1.9-4.0)  LVIDd (2D):             3.53 cm (3.4-5.7)  LVIDs (2D):             2.51 cm  LV FS (2D):          28.9 %   (>25%)  LV EF (2D):              57 %    (>55%)  Relative Wall Thickness  0.48    (<0.42)    LV DIASTOLIC FUNCTION:  MV Peak E: 1.37 m/s Decel Time: 184 msec    SPECTRAL DOPPLER ANALYSIS (where applicable):  Mitral Valve:  MV P1/2 Time: 53.48 msec  MV Area, PHT: 4.11 cm²    Aortic Valve: AoV Max Lei: 1.83 m/s AoV Peak P.4 mmHg AoV Mean P.6 mmHg    LVOT Vmax: 0.38 m/s LVOT VTI: 0.091 m LVOT Diameter: 2.00 cm    AoV Area, Vmax: 0.66 cm² AoV Area, VTI: 0.93 cm² AoV Area, Vmn: 0.65 cm²  Ao VTI: 0.309  Tricuspid Valve and PA/RV Systolic Pressure: TR Max Velocity: 2.98 m/s RA   Pressure: 15 mmHg RVSP/PASP: 50.5 mmHg      PHYSICIAN INTERPRETATION:  Left Ventricle: The left ventricular internal cavity size is normal.  Global LV systolic function was normal. Left ventricular ejection   fraction, by visual estimation, is 60 to 65%. Abnormal (paradoxical)   septal motion consistent with post-operative status. The mitral in-flow   pattern reveals no discernable A-wave, therefore no comment on diastolic   function can be made.  Left ventricle chordal calcification.  Right Ventricle: Normal right ventricular size and function.  Left Atrium: Left atrial enlargement.  Right Atrium: Right atrial enlargement.  Pericardium:There is no evidence of pericardial effusion. There is a   moderate pleural effusion in both left and right lateral regions.  Mitral Valve: The mitral valve is degenerative in appearance. Moderate   thickening and calcification of the anterior and posterior mitral valve   leaflets. There is mild mitral annular calcification. Severe mitral valve   regurgitation is seen. Borderline elevated mitral valve mean gradient    5 mmHg at  BPM.  Tricuspid Valve: Severe tricuspid regurgitation is visualized. Estimated   pulmonary artery systolic pressure is 50.5 mmHg assuming a right atrial   pressure of 15 mmHg, which is consistent with moderate pulmonary   hypertension.  Aortic Valve: Trivial aortic valve regurgitation is seen. There is a   mechanical valve in the aortic position, appears well seated with peak   velocity within normal limits.  Pulmonic Valve: The pulmonic valve is normal.  Aorta: Aortic root measured at Sinus of Valsalva is normal.  Venous: The inferior vena cava was dilated,with respiratory size   variation less than 50%, consistent with elevated right atrial pressure.      Summary:   1. Normal global left ventricular systolic function.   2. Left ventricular ejection fraction, by visual estimation, is 60 to   65%.   3. Abnormal septal motion consistent with post-operative status.   4. There is mild septal left ventricular hypertrophy.   5. The mitral in-flow pattern reveals no discernable A-wave, therefore   no comment on diastolic function can be made.   6. Left ventricle chordal calcification.   7. Normal right ventricular size and function.   8. Left atrial enlargement.   9. Right atrial enlargement.  10. Degenerative mitral valve.  11. Mild mitral annular calcification.  12. Moderate thickening and calcification of the anterior and posterior   mitral valve leaflets.  13. Severe mitral valve regurgitation.  14. Borderline elevated mitral valve mean gradient    5 mmHg at  BPM.  15. Severe tricuspid regurgitation.  16. There is a mechanical valve in the aortic position, appears well   seated with peak velocity within normal limits.  17. Moderate pleural effusion in both left and right lateral regions.  18. Estimated pulmonary artery systolic pressure is 50.5 mmHg assuming a   right atrial pressureof 15 mmHg, which is consistent with moderate   pulmonary hypertension.  19. There is no evidence of pericardial effusion.    [id number] Gunjan Pruitt MD Electronically signed on   2023 at 1:15:43 PM            < end of copied text >      ECHO:

## 2023-08-24 NOTE — PROGRESS NOTE ADULT - ASSESSMENT
improved chf/pleural effusion  s/p avr, mechanical  MR with CHF  af rate controlled, may be contributing to chf      suggest  continue diuresis , full anticoag and rate control    d/w patient and daughter louise regarding  ?transfer for further evaluation ,may include EP evaluation/cardioversion/ablation and heart team evaluation  regarding mitral regurg  awaiting decision from patient and family    50 minutes of time spent on case discussing with hospitalist, patient daughter, dr spencer, review of chart, and visit/note

## 2023-08-24 NOTE — PROGRESS NOTE ADULT - SUBJECTIVE AND OBJECTIVE BOX
Follow-up Pulmonary Progress Note  Chief Complaint : Heart failure    patient seen and examined  appears comfortable  on room air  cxr noted to increase on right sided pleural effusion.   patient and family wants evaluation with heart valve evaluation      Allergies :penicillin (Rash)  sulfa drugs (Rash)      PAST MEDICAL & SURGICAL HISTORY:  S/P AVR    Atrial fibrillation    Hypothyroidism    History of appendectomy    S/P AVR        Medications:  MEDICATIONS  (STANDING):  aspirin enteric coated 81 milliGRAM(s) Oral at bedtime  atorvastatin 20 milliGRAM(s) Oral at bedtime  ezetimibe 10 milliGRAM(s) Oral at bedtime  furosemide   Injectable 40 milliGRAM(s) IV Push <User Schedule>  heparin  Infusion. 900 Unit(s)/Hr (9 mL/Hr) IV Continuous <Continuous>  levothyroxine 88 MICROGram(s) Oral daily  metoprolol succinate ER 25 milliGRAM(s) Oral daily  polyethylene glycol 3350 17 Gram(s) Oral two times a day  senna 2 Tablet(s) Oral at bedtime    MEDICATIONS  (PRN):  acetaminophen     Tablet .. 650 milliGRAM(s) Oral every 6 hours PRN Mild Pain (1 - 3)  benzonatate 100 milliGRAM(s) Oral every 8 hours PRN Cough  cyclobenzaprine 5 milliGRAM(s) Oral at bedtime PRN Muscle Spasm  guaiFENesin Oral Liquid (Sugar-Free) 200 milliGRAM(s) Oral every 6 hours PRN Cough  heparin   Injectable 2000 Unit(s) IV Push every 6 hours PRN For aPTT between 40 - 57  heparin   Injectable 4500 Unit(s) IV Push every 6 hours PRN For aPTT less than 40  meclizine 12.5 milliGRAM(s) Oral every 8 hours PRN for dizziness  methyl salicylate 15%/menthol 10% Topical Cream 1 Application(s) Topical three times a day PRN leg pain  sodium chloride 0.65% Nasal 1 Spray(s) Both Nostrils three times a day PRN Nasal Congestion/Dryness      Antibiotics History  levoFLOXacin IVPB 500 milliGRAM(s) IV Intermittent once, 08-19-23 @ 12:43      Heme Medications   aspirin enteric coated 81 milliGRAM(s) Oral at bedtime, 08-20-23 @ 00:00  heparin   Injectable 4500 Unit(s) IV Push every 6 hours, 08-22-23 @ 15:17 PRN  heparin   Injectable 2000 Unit(s) IV Push every 6 hours, 08-22-23 @ 15:17 PRN  heparin  Infusion. 900 Unit(s)/Hr IV Continuous <Continuous>, 08-23-23 @ 03:02      GI Medications  polyethylene glycol 3350 17 Gram(s) Oral two times a day, 08-24-23 @ 09:54, STAT  senna 2 Tablet(s) Oral at bedtime, 08-24-23 @ 09:53, Routine        LABS:                        12.4   9.15  )-----------( 211      ( 24 Aug 2023 07:35 )             37.1     08-24    135  |  96  |  19  ----------------------------<  103<H>  3.7   |  31  |  1.16    Ca    9.4      24 Aug 2023 07:35  Mg     1.9     08-24    TPro  7.2  /  Alb  3.0<L>  /  TBili  2.1<H>  /  DBili  x   /  AST  38  /  ALT  31  /  AlkPhos  78  08-24     Trend Bun/Cr  08-24-23 @ 07:35  BUN/CR -  19 / 1.16  08-23-23 @ 06:53  BUN/CR -  18 / 1.16  08-22-23 @ 05:29  BUN/CR -  24<H> / 1.36<H>  08-21-23 @ 07:09  BUN/CR -  23 / 1.28  08-20-23 @ 16:00  BUN/CR -  20 / 1.39<H>  08-20-23 @ 07:05  BUN/CR -  18 / 1.28  08-19-23 @ 12:06  BUN/CR -  16 / 1.32<H>  08-04-22 @ 06:45  BUN/CR -  11 / 1.18  08-03-22 @ 06:47  BUN/CR -  16 / 1.18  08-02-22 @ 18:10  BUN/CR -  19 / 1.10      RADIOLOGY  CXR:  increasing right plearual effusion       VITALS:  T(C): 36.6 (08-24-23 @ 12:29), Max: 36.8 (08-24-23 @ 05:08)  T(F): 97.8 (08-24-23 @ 12:29), Max: 98.2 (08-24-23 @ 05:08)  HR: 71 (08-24-23 @ 12:29) (63 - 98)  BP: 120/69 (08-24-23 @ 12:29) (102/68 - 120/69)  BP(mean): --  ABP: --  ABP(mean): --  RR: 16 (08-24-23 @ 12:29) (16 - 18)  SpO2: 98% (08-24-23 @ 12:29) (94% - 98%)  CVP(mm Hg): --  CVP(cm H2O): --    Ins and Outs     08-23-23 @ 07:01  -  08-24-23 @ 07:00  --------------------------------------------------------  IN: 275 mL / OUT: 800 mL / NET: -525 mL    08-24-23 @ 07:01  -  08-24-23 @ 13:39  --------------------------------------------------------  IN: 240 mL / OUT: 0 mL / NET: 240 mL                I&O's Detail    23 Aug 2023 07:01  -  24 Aug 2023 07:00  --------------------------------------------------------  IN:    Heparin Infusion: 35 mL    Oral Fluid: 240 mL  Total IN: 275 mL    OUT:    Voided (mL): 800 mL  Total OUT: 800 mL    Total NET: -525 mL      24 Aug 2023 07:01  -  24 Aug 2023 13:39  --------------------------------------------------------  IN:    Oral Fluid: 240 mL  Total IN: 240 mL    OUT:  Total OUT: 0 mL    Total NET: 240 mL

## 2023-08-24 NOTE — DISCHARGE NOTE PROVIDER - ATTENDING DISCHARGE PHYSICAL EXAMINATION:
Gen: NAD  Neuro: AO3 grossly moves all extremities   Lungs: diminished breath sounds at bases no wheezing  Cardio: IRRR S1 S2 +murmur

## 2023-08-24 NOTE — PROGRESS NOTE ADULT - REASON FOR ADMISSION
Acute hypoxic respiratory failure
CHF
Acute hypoxic respiratory failure
SOB
Acute hypoxic respiratory failure
Acute hypoxic respiratory failure

## 2023-08-24 NOTE — PROGRESS NOTE ADULT - SUBJECTIVE AND OBJECTIVE BOX
Patient is a 88y old  Female who presents with a chief complaint of CHF (24 Aug 2023 10:34)      INTERVAL HPI/OVERNIGHT EVENTS: Patient seen and examined at bedside. No overnight events.   Continues to have SOB     MEDICATIONS  (STANDING):  aspirin enteric coated 81 milliGRAM(s) Oral at bedtime  atorvastatin 20 milliGRAM(s) Oral at bedtime  ezetimibe 10 milliGRAM(s) Oral at bedtime  furosemide   Injectable 40 milliGRAM(s) IV Push <User Schedule>  heparin  Infusion. 900 Unit(s)/Hr (9 mL/Hr) IV Continuous <Continuous>  levothyroxine 88 MICROGram(s) Oral daily  metoprolol succinate ER 25 milliGRAM(s) Oral daily  polyethylene glycol 3350 17 Gram(s) Oral two times a day  senna 2 Tablet(s) Oral at bedtime    MEDICATIONS  (PRN):  acetaminophen     Tablet .. 650 milliGRAM(s) Oral every 6 hours PRN Mild Pain (1 - 3)  benzonatate 100 milliGRAM(s) Oral every 8 hours PRN Cough  cyclobenzaprine 5 milliGRAM(s) Oral at bedtime PRN Muscle Spasm  guaiFENesin Oral Liquid (Sugar-Free) 200 milliGRAM(s) Oral every 6 hours PRN Cough  heparin   Injectable 2000 Unit(s) IV Push every 6 hours PRN For aPTT between 40 - 57  heparin   Injectable 4500 Unit(s) IV Push every 6 hours PRN For aPTT less than 40  meclizine 12.5 milliGRAM(s) Oral every 8 hours PRN for dizziness  methyl salicylate 15%/menthol 10% Topical Cream 1 Application(s) Topical three times a day PRN leg pain  sodium chloride 0.65% Nasal 1 Spray(s) Both Nostrils three times a day PRN Nasal Congestion/Dryness      Allergies    penicillin (Rash)  sulfa drugs (Rash)    Intolerances        REVIEW OF SYSTEMS:  CONSTITUTIONAL: No fever or chills  RESPIRATORY: No cough, positive shortness of breath  CARDIOVASCULAR: No chest pain, palpitations    Vital Signs Last 24 Hrs  T(C): 36.6 (24 Aug 2023 12:29), Max: 36.8 (24 Aug 2023 05:08)  T(F): 97.8 (24 Aug 2023 12:29), Max: 98.2 (24 Aug 2023 05:08)  HR: 71 (24 Aug 2023 12:29) (63 - 98)  BP: 120/69 (24 Aug 2023 12:29) (102/68 - 120/69)  BP(mean): --  RR: 16 (24 Aug 2023 12:29) (16 - 18)  SpO2: 98% (24 Aug 2023 12:29) (94% - 98%)    Parameters below as of 24 Aug 2023 12:29  Patient On (Oxygen Delivery Method): room air      I&O's Summary    23 Aug 2023 07:01  -  24 Aug 2023 07:00  --------------------------------------------------------  IN: 275 mL / OUT: 800 mL / NET: -525 mL    24 Aug 2023 07:01  -  24 Aug 2023 13:45  --------------------------------------------------------  IN: 240 mL / OUT: 0 mL / NET: 240 mL          PHYSICAL EXAM:  GENERAL: NAD  HEENT:  AT/NC, anicteric, moist mucous membranes, EOMI, PERRL, no lid-lag, conjunctiva and sclera clear  CHEST/LUNG: diminished breath sounds at bases; no rales, wheezes, or rhonchi,  normal respiratory effort, no intercostal retractions  HEART: IRRR, S1, S2, 4/6 systolic murmur; no/trace pitting edema  ABDOMEN:  BS+, soft, nontender, nondistended  MSK/EXTREMITIES: palpable peripheral pulses, no clubbing or cyanosis  NERVOUS SYSTEM: answers questions and follows commands appropriately, A&Ox3 grossly moves all extremities   PSYCH: Appropriate affect, Alert & Awake; fair judgement      LABS: Personally reviewed  CBC                        12.4   9.15  )-----------( 211      ( 24 Aug 2023 07:35 )             37.1     CMP  08-24    135  |  96  |  19  ----------------------------<  103  3.7   |  31  |  1.16    Ca    9.4      24 Aug 2023 07:35  Mg     1.9     08-24    TPro  7.2  /  Alb  3.0  /  TBili  2.1  /  DBili  x   /  AST  38  /  ALT  31  /  AlkPhos  78  08-24          PT/INR - ( 24 Aug 2023 07:35 )   PT: 19.8 sec;   INR: 1.77 ratio         PTT - ( 24 Aug 2023 07:35 )  PTT:104.0 sec                            Urinalysis Basic - ( 24 Aug 2023 07:35 )    Color: x / Appearance: x / SG: x / pH: x  Gluc: 103 mg/dL / Ketone: x  / Bili: x / Urobili: x   Blood: x / Protein: x / Nitrite: x   Leuk Esterase: x / RBC: x / WBC x   Sq Epi: x / Non Sq Epi: x / Bacteria: x        Culture - Blood (collected 19 Aug 2023 13:07)  Source: .Blood Blood-Peripheral  Preliminary Report (23 Aug 2023 22:00):    No growth at 4 days    Culture - Blood (collected 19 Aug 2023 13:07)  Source: .Blood Blood-Peripheral  Preliminary Report (23 Aug 2023 22:00):    No growth at 4 days            Culture - Blood (collected 08-19-23 @ 13:07)  Source: .Blood Blood-Peripheral  Preliminary Report (08-23-23 @ 22:00):    No growth at 4 days    Culture - Blood (collected 08-19-23 @ 13:07)  Source: .Blood Blood-Peripheral  Preliminary Report (08-23-23 @ 22:00):    No growth at 4 days        RADIOLOGY & ADDITIONAL TESTS: Personally reviewed.   chest x-ray personally reviewed     Consultant(s) Notes Reviewed:  [x] YES  [ ] NO - discussed with Dr Kohli plan to transfer to [facility] under Dr Dasilva for further management   Discussed with SW/GLORIA, RN

## 2023-08-24 NOTE — DISCHARGE NOTE PROVIDER - DETAILS OF MALNUTRITION DIAGNOSIS/DIAGNOSES
This patient has been assessed with a concern for Malnutrition and was treated during this hospitalization for the following Nutrition diagnosis/diagnoses:     -  08/20/2023: Moderate protein-calorie malnutrition

## 2023-08-24 NOTE — DISCHARGE NOTE PROVIDER - CARE PROVIDER_API CALL
Ej Torres.  [address] [address]  Phone: [phone]  Fax: [phone]  Follow Up Time:

## 2023-08-25 NOTE — PROGRESS NOTE ADULT - PROVIDER SPECIALTY LIST ADULT
Cardiology
Hospitalist
Cardiology
Cardiology
Pulmonology
Hospitalist
Hospitalist
Cardiology
Hospitalist

## 2023-08-25 NOTE — PROGRESS NOTE ADULT - ASSESSMENT
88F with PMH a fib and Mechanical AVR ([place]) on Coumadin, CAD, HTN, HLD, hypothyroidism, history of cholelithiasis/acute cholecystitis (managed conservatively 8/2022), presented from home with few week history of worsening shortness of breath, bilateral LE swelling and orthopnea. Labs notable for elevated troponin, BNP and CXR showing bilateral pleural effusion (R>L).    Acute Hypoxic Respiratory Failure  Acute CHF 2/2 Severe MR/TR  Bilateral Pleural Effusion (R>L)  Elevated Troponin  -Trop likely demand ischemia in setting of rapid a fib and CHF, downtrended  -Echo with normal LV systolic function, EF 60-65%, severe MR and TR, moderate pulm HTN  -CT showed mildly loculated moderate right pleural effusion, small layering left pleural effusion, atelectasis/consolidation of right middle lobe, basilar right lower lobe and anterobasilar left lower lobe; mildly enlarged R lower paratracheal lymph node  -Repeat CXR 8/21 with improvement  -Lasix 40mg IVP increased from daily to BID 8/21 however BP did not tolerate - switched to daily for now  -Strict in's and out's, daily weights  -O2 supplementation for O2 Sat >92%, titrated off to RA  -Incentive spirometer  -Cardiology Dr. Pruitt consulted, recs appreciated  -Pulm Dr. Zaman consulted, recs appreciated    A fib with RVR  Mechanical AVR  -Initially switched to Metoprolol tartrate 25 mg PO QID, takes 50 mg at home. 8/23 changed to Metoprolol succinate 25 mg once daily, to be uptitrated as able  -s/p digoxin load 8/21. Elevated dig level discussed with cardio, recommended repeating levels in 2 days. - restart Digoxin level now therapeutic   -INR almost therapeutic - Coumadin 3mg tonight -Continue with heparin gtt until INR at goal 2.5 - 3.5    CAD/HTN/HLD  -Continue aspirin, metoprolol, atorvastatin for crestor while inpatient, zetia  -Echo as above    Low Grade Fever/Chills/Cough  -Respiratory panel/COVID negative  -CT chest findings noted above  -Supportive care: tessalon perles PRN, Robitussin  -Monitor for fevers, symptoms    Hypothyroidism  -Per [employer] 5/10/23 last TSH 2.27, free T4 1.3  -TSH elevated in setting of acute illness  -Continue home dose levothyroxine 88 mcg for now  -Repeat TFTs after discharge with PCP    Elevated Bilirubin with likely component of congestive hepatopathy  History of Acute Cholecystitis  -RUQ u/s showed cholelithiasis without evidence of cholecystitis or biliary obstruction, patient is asymptomatic  -Elevated total bili appears chronic, improved with diuresis  -Monitor for now    Leg Cramping, resolved  -Acute on chronic in setting of IV diuresis, ?2/2 electrolytes  -Bilateral LE venous dopplers negative for DVT  -K and Mg repleted 8/20  -Warm compresses, stretching  -Flexeril PRN at family's request    Vertigo  -Meclizine PRN    Likely CKD  -Baseline Cr ~1.2 range  -Close monitoring of renal function while on diuretics  -Avoid nephrotoxic agents    DVT PPx  -Coumadin resumed    Advance Directive  -Full Code    Dispo: transfer to [facility] for further management. Dr Dasilva accepts    Discussed with daughter Sandrita Delgadillo at bedside, aware and in agreement with above. [phone]

## 2023-08-25 NOTE — PROGRESS NOTE ADULT - SUBJECTIVE AND OBJECTIVE BOX
Patient is a 88y old  Female who presents with a chief complaint of SOB (24 Aug 2023 13:38)      INTERVAL HPI/OVERNIGHT EVENTS: Patient seen and examined at bedside. No overnight events.    MEDICATIONS  (STANDING):  aspirin enteric coated 81 milliGRAM(s) Oral at bedtime  atorvastatin 20 milliGRAM(s) Oral at bedtime  digoxin     Tablet 62.5 MICROGram(s) Oral daily  ezetimibe 10 milliGRAM(s) Oral at bedtime  furosemide   Injectable 40 milliGRAM(s) IV Push <User Schedule>  heparin  Infusion. 600 Unit(s)/Hr (6 mL/Hr) IV Continuous <Continuous>  levothyroxine 88 MICROGram(s) Oral daily  metoprolol succinate ER 25 milliGRAM(s) Oral daily  polyethylene glycol 3350 17 Gram(s) Oral two times a day  senna 2 Tablet(s) Oral at bedtime    MEDICATIONS  (PRN):  acetaminophen     Tablet .. 650 milliGRAM(s) Oral every 6 hours PRN Mild Pain (1 - 3)  benzonatate 100 milliGRAM(s) Oral every 8 hours PRN Cough  cyclobenzaprine 5 milliGRAM(s) Oral at bedtime PRN Muscle Spasm  guaiFENesin Oral Liquid (Sugar-Free) 200 milliGRAM(s) Oral every 6 hours PRN Cough  heparin   Injectable 2000 Unit(s) IV Push every 6 hours PRN For aPTT between 40 - 57  heparin   Injectable 4500 Unit(s) IV Push every 6 hours PRN For aPTT less than 40  meclizine 12.5 milliGRAM(s) Oral every 8 hours PRN for dizziness  methyl salicylate 15%/menthol 10% Topical Cream 1 Application(s) Topical three times a day PRN leg pain  sodium chloride 0.65% Nasal 1 Spray(s) Both Nostrils three times a day PRN Nasal Congestion/Dryness      Allergies    penicillin (Rash)  sulfa drugs (Rash)    Intolerances        REVIEW OF SYSTEMS:  CONSTITUTIONAL: No fever or chills  RESPIRATORY: No cough, wheezing, or shortness of breath  CARDIOVASCULAR: No chest pain, palpitations    Vital Signs Last 24 Hrs  T(C): 36.7 (25 Aug 2023 12:50), Max: 36.7 (25 Aug 2023 12:50)  T(F): 98 (25 Aug 2023 12:50), Max: 98 (25 Aug 2023 12:50)  HR: 75 (25 Aug 2023 12:50) (70 - 76)  BP: 112/60 (25 Aug 2023 12:50) (96/61 - 119/74)  BP(mean): --  RR: 16 (25 Aug 2023 12:50) (16 - 16)  SpO2: 98% (25 Aug 2023 12:50) (98% - 99%)    Parameters below as of 25 Aug 2023 12:50  Patient On (Oxygen Delivery Method): room air      I&O's Summary    24 Aug 2023 07:01  -  25 Aug 2023 07:00  --------------------------------------------------------  IN: 240 mL / OUT: 900 mL / NET: -660 mL    25 Aug 2023 07:01  -  25 Aug 2023 14:57  --------------------------------------------------------  IN: 120 mL / OUT: 0 mL / NET: 120 mL      PHYSICAL EXAM:  GENERAL: NAD  HEENT:  AT/NC, anicteric, moist mucous membranes, EOMI, PERRL, no lid-lag, conjunctiva and sclera clear  CHEST/LUNG: diminished breath sounds at bases; no rales, wheezes, or rhonchi,  normal respiratory effort, no intercostal retractions  HEART: IRRR, S1, S2, 4/6 systolic murmur; no/trace pitting edema  ABDOMEN:  BS+, soft, nontender, nondistended  MSK/EXTREMITIES: palpable peripheral pulses, no clubbing or cyanosis  NERVOUS SYSTEM: answers questions and follows commands appropriately, A&Ox3 grossly moves all extremities   PSYCH: Appropriate affect, Alert & Awake; fair judgement      LABS: Personally reviewed  CBC                        12.6   9.70  )-----------( 226      ( 25 Aug 2023 06:50 )             38.0     CMP  08-25    135  |  96  |  18  ----------------------------<  93  3.7   |  31  |  1.00    Ca    9.9      25 Aug 2023 06:50  Mg     1.9     08-24    TPro  7.2  /  Alb  3.0  /  TBili  2.1  /  DBili  x   /  AST  38  /  ALT  31  /  AlkPhos  78  08-24          PT/INR - ( 25 Aug 2023 10:08 )   PT: 27.6 sec;   INR: 2.42 ratio         PTT - ( 25 Aug 2023 10:08 )  PTT:96.5 sec                            Urinalysis Basic - ( 25 Aug 2023 06:50 )    Color: x / Appearance: x / SG: x / pH: x  Gluc: 93 mg/dL / Ketone: x  / Bili: x / Urobili: x   Blood: x / Protein: x / Nitrite: x   Leuk Esterase: x / RBC: x / WBC x   Sq Epi: x / Non Sq Epi: x / Bacteria: x        Culture - Blood (collected 19 Aug 2023 13:07)  Source: .Blood Blood-Peripheral  Final Report (24 Aug 2023 22:01):    No growth at 5 days    Culture - Blood (collected 19 Aug 2023 13:07)  Source: .Blood Blood-Peripheral  Final Report (24 Aug 2023 22:01):    No growth at 5 days            Culture - Blood (collected 08-19-23 @ 13:07)  Source: .Blood Blood-Peripheral  Final Report (08-24-23 @ 22:01):    No growth at 5 days    Culture - Blood (collected 08-19-23 @ 13:07)  Source: .Blood Blood-Peripheral  Final Report (08-24-23 @ 22:01):    No growth at 5 days        RADIOLOGY & ADDITIONAL TESTS: Personally reviewed.     Consultant(s) Notes Reviewed:  [x] YES  [ ] NO  discussed with Dr Kohli awaiting transfer to [facility]    Discussed with SW/GLORIA, RN

## 2023-08-25 NOTE — PROGRESS NOTE ADULT - NUTRITIONAL ASSESSMENT
This patient has been assessed with a concern for Malnutrition and has been determined to have a diagnosis/diagnoses of Moderate protein-calorie malnutrition.    This patient is being managed with:   Diet DASH/TLC-  Sodium & Cholesterol Restricted  Supplement Feeding Modality:  Oral  Ensure Plus High Protein Cans or Servings Per Day:  1       Frequency:  Daily  Entered: Aug 20 2023  2:52PM  

## 2023-08-25 NOTE — CHART NOTE - NSCHARTNOTEFT_GEN_A_CORE
Nutrition Follow Up Note  Hospital Course (Per Electronic Medical Record):   Source: Medical Record [X] Patient [X]  Nursing Staff [X]     Diet: DASH/TLC, Ensure High protein QD     Patient tolerating diet noted consuming 50-75% , labs reviewed ,Patient receiving banana qd with breakfast meal due to  patient  on Lasix therapy . Patient pending transfer  for cardio work up , weight noted stable . continue current diet .     Current Weight: (8/25) 123.4/56kg                          (8/24) 123.4/56kg                         (8/23) 126.9/56.8kg                         (8/22) 125.2/56.8kg                               Pertinent Medications: MEDICATIONS  (STANDING):  aspirin enteric coated 81 milliGRAM(s) Oral at bedtime  atorvastatin 20 milliGRAM(s) Oral at bedtime  digoxin     Tablet 62.5 MICROGram(s) Oral daily  ezetimibe 10 milliGRAM(s) Oral at bedtime  furosemide   Injectable 40 milliGRAM(s) IV Push <User Schedule>  heparin  Infusion. 600 Unit(s)/Hr (6 mL/Hr) IV Continuous <Continuous>  levothyroxine 88 MICROGram(s) Oral daily  metoprolol succinate ER 25 milliGRAM(s) Oral daily  polyethylene glycol 3350 17 Gram(s) Oral two times a day  senna 2 Tablet(s) Oral at bedtime    MEDICATIONS  (PRN):  acetaminophen     Tablet .. 650 milliGRAM(s) Oral every 6 hours PRN Mild Pain (1 - 3)  benzonatate 100 milliGRAM(s) Oral every 8 hours PRN Cough  cyclobenzaprine 5 milliGRAM(s) Oral at bedtime PRN Muscle Spasm  guaiFENesin Oral Liquid (Sugar-Free) 200 milliGRAM(s) Oral every 6 hours PRN Cough  heparin   Injectable 2000 Unit(s) IV Push every 6 hours PRN For aPTT between 40 - 57  heparin   Injectable 4500 Unit(s) IV Push every 6 hours PRN For aPTT less than 40  meclizine 12.5 milliGRAM(s) Oral every 8 hours PRN for dizziness  methyl salicylate 15%/menthol 10% Topical Cream 1 Application(s) Topical three times a day PRN leg pain  sodium chloride 0.65% Nasal 1 Spray(s) Both Nostrils three times a day PRN Nasal Congestion/Dryness      Pertinent Labs:  08-25 Na135 mmol/L Glu 93 mg/dL K+ 3.7 mmol/L Cr  1.00 mg/dL BUN 18 mg/dL 08-24 Alb 3.0 g/dL<L>  Hgb Hgb12.6g/dl ,  Hct 38.0      Skin: IAD     Edema: none    Last BM: (8/19)     Estimated Needs:   [X] No Change since Previous Assessment      Previous Nutrition Diagnosis: Moderate Protein Calorie Malnutrition     Nutrition Diagnosis is [X] Ongoing & addressed ,          New Nutrition Diagnosis: [X] Not Applicable      Interventions:   1. Recommend add Ensure HP QD       Monitoring & Evaluation: will monitor:  [X] Weights   [X] PO Intake   [X] Follow Up (Per Protocol)  [X] Tolerance to Diet Prescription       RD to follow as per Nutrition protocol  Yanni Bender RDN

## 2023-08-25 NOTE — PROGRESS NOTE ADULT - TIME BILLING
Reviewing chart notes and data, reviewing telemetry monitor records, face to face time counseling the patient, communicating with Dr. Kohli cardio, coordinating care with SW/CM at [facility].
Reviewing chart notes and data, reviewing telemetry monitor records, face to face time counseling the patient, communicating with Dr. Pimentel cardio, coordinating care with SW/CM at [facility].

## 2023-08-26 NOTE — H&P ADULT - PROBLEM SELECTOR PLAN 2
[facility]  Orthopedics  Progress Note    Patient Name: Garry Carrillo  MRN: [id number]  Admission Date: 6/23/2017  Hospital Length of Stay: 7 days  Attending Provider: Lasha Coe MD  Primary Care Provider: Lasha Coe MD  Follow-up For: Procedure(s) (LRB):  LAMINECTOMY/FUSION-THORACIC T11-L1 laminectomy and PSF with instrumentation; T11-12 discectomy and debridement (N/A)    Post-Operative Day: 4 Days Post-Op  Subjective:     Principal Problem:Thoracic discitis    Principal Orthopedic Problem: same    Interval History: The patient was seen and examined this morning at the bedside. Patient reports no acute issues overnight.  Pain controlled.  Mobilized.  Hoping to go home today.    Drain with 50cc output.     Review of patient's allergies indicates:   Allergen Reactions    Voriconazole Other (See Comments)     Increased LFTs    Tylox [oxycodone-acetaminophen] Rash       Current Facility-Administered Medications   Medication    0.9%  NaCl infusion (for blood administration)    acetaminophen tablet 650 mg    alprazolam tablet 0.25 mg    azithromycin tablet 500 mg    butalbital-acetaminophen-caffeine -40 mg per tablet 1 tablet    diphenhydrAMINE capsule 25 mg    ethambutol tablet 800 mg    ferrous sulfate EC tablet 325 mg    hydrocodone-acetaminophen 10-325mg per tablet 1 tablet    hydrocodone-acetaminophen 7.5-325mg per tablet 1 tablet    HYDROmorphone injection 1 mg    isavuconazonium sulfate Cap 2 tablet    lactulose 20 gram/30 mL solution Soln 30 g    levalbuterol nebulizer solution 1.25 mg    lipase-protease-amylase 24,000-76,000-120,000 units capsule 6 capsule    magnesium oxide tablet 400 mg    magnesium sulfate 2g in water 50mL IVPB (premix)    And    magnesium sulfate 2g in water 50mL IVPB (premix)    methocarbamol tablet 500 mg    metoprolol tartrate (LOPRESSOR) tablet 25 mg    ondansetron disintegrating tablet 8 mg    pantoprazole EC tablet  "40 mg    polyethylene glycol packet 17 g    potassium chloride 10% solution 40 mEq    And    potassium chloride 10% solution 40 mEq    And    potassium chloride 10% solution 60 mEq    predniSONE tablet 5 mg    pregabalin capsule 75 mg    SITagliptin tablet 100 mg    sodium polystyrene 15 gram/60 mL suspension 30 g    sulfamethoxazole-trimethoprim 800-160mg per tablet 1 tablet    tacrolimus capsule 2.5 mg    tacrolimus capsule 3 mg    valganciclovir tablet 450 mg     Objective:     Vital Signs (Most Recent):  Temp: 98.3 °F (36.8 °C) (06/30/17 0400)  Pulse: 92 (06/30/17 0700)  Resp: 18 (06/30/17 0400)  BP: (!) 115/59 (06/30/17 0400)  SpO2: 95 % (06/30/17 0400) Vital Signs (24h Range):  Temp:  [97.9 °F (36.6 °C)-98.3 °F (36.8 °C)] 98.3 °F (36.8 °C)  Pulse:  [] 92  Resp:  [18] 18  SpO2:  [95 %-96 %] 95 %  BP: (110-124)/(57-80) 115/59     Weight: 49.3 kg (108 lb 11 oz)  Height: 5' 7" (170.2 cm)  Body mass index is 17.02 kg/m².      Intake/Output Summary (Last 24 hours) at 06/30/17 0818  Last data filed at 06/30/17 0454   Gross per 24 hour   Intake             1560 ml   Output             1450 ml   Net              110 ml       Ortho/SPM Exam      NAD, A/O x 3.  Flushed and sweating  Wound c/d/i with clean dressing.  No focal motor or sensory deficits noted.     Significant Labs:   BMP:     Recent Labs  Lab 06/30/17  0631   GLU 85      K 4.6      CO2 29   BUN 17   CREATININE 0.8   CALCIUM 8.8   MG 1.8     CBC:     Recent Labs  Lab 06/29/17  0543 06/30/17  0632   WBC 3.90 3.85*   HGB 7.5* 7.6*   HCT 23.4* 23.9*   * 134*     All pertinent labs within the past 24 hours have been reviewed.        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 male status post: T11/12 discectomy/corpectomy, T11-L1 PSF    Plan:  1) Antibiotics: per ID for fungal discitis  2) Weight bearing status: wbat  3) Labs: reviewed   4) DVT Prophylaxis: mechanical  5) Lines/Drains: drain to gravity, PIV  6) PT/OT - OK to mobilize and sit in chair  7) Pain control  8) encourage IS use  9) dc drain  10) xrays            Dispo: OK to dc home after xrays.  Clinic will call to arrange follow up      Parker Herron MD  Orthopedics  [facility]   Currently in afib   Monitor on tele  on heparin gtt - was therapeutic on coumadin but now holding for potential intervention by struc heart  c/w dig and metoprolol 25mg - can increase dose  Cards eval pending - consider EP for ablation if aflutter    Mechanical AVR - c/w heparin

## 2023-08-26 NOTE — H&P ADULT - PROBLEM SELECTOR PLAN 1
Acute CHF 2/2 Severe MR/TR  Elevated Troponin likely NSTEMI type 2 in setting of afib with RVR and acute on chronic diastolic heart failure  -TTE 8/20 with EF 60-65%, severe MR and TR, moderate pulm HTN  -Lasix 40mg IVP increased from daily to BID 8/21 however BP did not tolerate - c/w daily for now  -Strict in's and out's, daily weights    Structural Heart Eval pending     AVR - c/w heparin Acute CHF 2/2 Severe MR/TR  Elevated Troponin likely NSTEMI type 2 in setting of afib with RVR and acute on chronic diastolic heart failure  -TTE 8/20 with EF 60-65%, severe MR and TR, moderate pulm HTN  -Will hold Lasix today 8/26 - patient dry, no ANIYA, no JVD, per daughter with dizziness, standing up.   Will need to restart lasix - was getting 40mg IVP daily, (when increased from daily to BID 8/21 BP did not tolerate). Consider every other day.   -Strict in's and out's, daily weights    Structural Heart Eval pending     AVR - c/w heparin

## 2023-08-26 NOTE — H&P ADULT - PROBLEM/PLAN-5
[FreeTextEntry1] : Healthy 20 year old/young adult\par Discussed safety/anticipatory guidance\par Discussed avoiding risk taking behavior\par Discussed healthy lifestyle habits\par Reviewed immunization forecast and discussed need for any vaccines, reviewed side effects and VIS\par Discussed need for routine health maintenance and establishing relationship with adult provider\par Discussed need for routine ELVIS and gave appropriate handout\par Follow-up: 1 year with adult provider\par \par Discussed and/or provided information on the following:\par PHYSICAL GROWTH AND DEVELOPMENT: Physical and oral health, body image, healthy eating, physical activity\par SOCIAL AND ACADEMIC COMPENTENCE: Connectedness with family, peers, and community; interpersonal relationships; school performance\par EMOTIONAL WELL-BEING: Coping, mood regulation and mental health (depression), sexuality\par RISK REDUCTION: Tobacco, alcohol, or other drugs; pregnancy; STIs, Advice about unprotected sex/birth control\par VIOLENCE AND INJURY PREVENTION: Safety belt and helmet use, driving (graduated license) and substance abuse, guns, interpersonal violence (dating violence), bullying\par PHYSICAL ACTIVITY: Adequate physical activity in organized sports, after-school programs, fun activities; limits on screen time\par  DISPLAY PLAN FREE TEXT

## 2023-08-26 NOTE — H&P ADULT - NSHPPHYSICALEXAM_GEN_ALL_CORE
Vital Signs Last 24 Hrs  T(C): 36.7 (26 Aug 2023 04:36), Max: 36.7 (25 Aug 2023 12:50)  T(F): 98.1 (26 Aug 2023 04:36), Max: 98.1 (26 Aug 2023 04:36)  HR: 89 (26 Aug 2023 04:36) (75 - 102)  BP: 117/65 (26 Aug 2023 04:36) (81/54 - 121/69)  BP(mean): --  RR: 18 (26 Aug 2023 04:36) (16 - 18)  SpO2: 99% (26 Aug 2023 04:36) (95% - 99%)    Parameters below as of 26 Aug 2023 04:36  Patient On (Oxygen Delivery Method): nasal cannula  O2 Flow (L/min): 2      CONSTITUTIONAL: Well-groomed, in no apparent distress  EYES: No conjunctival or scleral injection, non-icteric;   ENMT: No external nasal lesions; MMM  NECK: Trachea midline without palpable neck mass; thyroid not enlarged and non-tender  RESPIRATORY: Breathing comfortably; no dullness to percussion; lungs CTA without wheeze/rhonchi/rales  CARDIOVASCULAR: +S1S2, irregular, +murmur; pedal pulses full and symmetric; no lower extremity edema  GASTROINTESTINAL: No palpable masses or tenderness, +BS throughout, no rebound/guarding; no hepatosplenomegaly; no hernia palpated  LYMPHATIC: No cervical LAD or tenderness  SKIN: No rashes or ulcers noted  NEUROLOGIC: CN II-XII intact; sensation intact in LEs b/l to light touch  PSYCHIATRIC: A+O x 3; mood and affect appropriate; appropriate insight and judgment

## 2023-08-26 NOTE — H&P ADULT - NSHPLABSRESULTS_GEN_ALL_CORE
12.9   8.93  )-----------( 233      ( 26 Aug 2023 07:19 )             39.0       08-25    135  |  96  |  18  ----------------------------<  93  3.7   |  31  |  1.00    Ca    9.9      25 Aug 2023 06:50        PT/INR - ( 26 Aug 2023 07:19 )   PT: 33.0 sec;   INR: 3.25 ratio         PTT - ( 26 Aug 2023 07:19 )  PTT:87.6 sec          07:20 - VBG - pH: 7.47  | pCO2: 47    | pO2: 49    | Lactate: 1.2        Urinalysis Basic - ( 25 Aug 2023 06:50 )    Color: x / Appearance: x / SG: x / pH: x  Gluc: 93 mg/dL / Ketone: x  / Bili: x / Urobili: x   Blood: x / Protein: x / Nitrite: x   Leuk Esterase: x / RBC: x / WBC x   Sq Epi: x / Non Sq Epi: x / Bacteria: x        EKG:   CXR:     Imaging:

## 2023-08-26 NOTE — H&P ADULT - TIME BILLING
Time spent reviewing outpatient/inpatient chart, performing history and physical, medication reconciliation, medical decision making, ordering labs/tests, discussion with consultants, discussion with patient/family at bedside, and documentation.

## 2023-08-26 NOTE — H&P ADULT - ASSESSMENT
88 year old female with past medical  A.fib and Mechanical AVR ([facility] [facility]) on coumadin, CAD, HTN/HLD, hypothyroidism, history of cholelithiasis/acute cholecystitis (managed conservatively 8/2022), who initially presented to  from home with few week history of worsening shortness of breath. At [place] found to have acute hypoxic respiratory failure due to acute on chronic diastolic heart failure due to severe MR/TR, NSTEMI type 2, persistent aflutter with RVR, and b/l pleural effusions - mild loculated R effusion + 1.4cm paratracheal LN. Tx to [facility] for structural heart eval and possible cardioversion

## 2023-08-26 NOTE — H&P ADULT - PROBLEM SELECTOR PLAN 3
-CT showed mildly loculated moderate right pleural effusion, small layering left pleural effusion, atelectasis/consolidation of right middle lobe, basilar right lower lobe and anterobasilar left lower lobe; mildly enlarged R lower paratracheal lymph node 1.4cm   Seen by pulm at  -   -Repeat CXR 8/21 with improvement  Per  chart - pulm d/w daughter and patient on 8/23 that b.l pl effusions likely cardiogenic in origin and that her breathing now better with diuretics suggestive of cardiogenic in origin, Discussed having small loculation on CT is a non specific finding in a patient who had open heart surgery, however with having enlarged lymph node and partial loculated pleural effusion together on same side can be suggestive of underlying process's like cancer being present. Probability is small-moderate risk and would need follow up.  Discussed 2 paths forward thoracentesis while at  but will need to be off a/c vs repeat imaging in few weeks  Now transferred to [facility] - will consult pulm for possible thoracentesis

## 2023-08-26 NOTE — H&P ADULT - HISTORY OF PRESENT ILLNESS
88 year old female with past medical  A.fib and Mechanical AVR ([facility]) on coumadin, CAD, HTN/HLD, hypothyroidism, history of cholelithiasis/acute cholecystitis (managed conservatively 8/2022), who initially presents from home with few week history of worsening shortness of breath. At [place] found to have acute on chronic diastolic heart failure due to severe MR/TR, NSTEMI type 2. Managed with IV lasix - BP did not tolerate bid, tx here on 40mg IV daily. Additionally, patient with b/l pleural effusions - mild loculated R effusion + 1.4cm paratracheal LN - seen by pulm - however family declined thoracentesis due to transfer to [facility]. Per pulm low suspicion for malignancy and most likely related to HF, recommended continued follow up with pulm here. Patient also with persistent aflutter with RVR, was initiated on digoxin, had supratherapeutic levels, held, now restarted. Was also bridged to coumadin, now on hold on heparin gtt in preparation for potential intervention by str heart..   TTE 8/20 showed EF 60-65%, severe MR, severe TR, moderate pulm HTN.  Here - patient saturating 99% on 2L, comfortable.    At baseline Patient lives at home alone and is independent.    88 year old female with past medical  A.fib and Mechanical AVR ([facility]) on coumadin, CAD, HTN/HLD, hypothyroidism, history of cholelithiasis/acute cholecystitis (managed conservatively 8/2022), who initially presents from home with few week history of worsening shortness of breath. At [place] found to have acute on chronic diastolic heart failure due to severe MR/TR, NSTEMI type 2. Managed with IV lasix - BP did not tolerate bid, tx here on 40mg IV daily. Additionally, patient with b/l pleural effusions - mild loculated R effusion + 1.4cm paratracheal LN - seen by pulm - however family declined thoracentesis due to transfer to [facility]. Per pulm low suspicion for malignancy and most likely related to HF, recommended continued follow up with pulm here. Patient also with persistent aflutter with RVR, was initiated on digoxin, had supratherapeutic levels, held, now restarted. Was also bridged to coumadin, now on hold on heparin gtt in preparation for potential intervention by str heart..   TTE 8/20 showed EF 60-65%, severe MR, severe TR, moderate pulm HTN.  Here - patient saturating 99% on 2L, comfortable.    At baseline Patient lives at home alone and is independent.

## 2023-08-26 NOTE — H&P ADULT - PROBLEM SELECTOR PLAN 5
TSH elevated at [facility] however in setting of acute illness  c/w synthroid 88 and will need repeat TSH as outpatient

## 2023-08-26 NOTE — H&P ADULT - PROBLEM SELECTOR PLAN 6
Elevated Bilirubin with likely component of congestive hepatopathy  History of Acute Cholecystitis  -RUQ u/s showed cholelithiasis without evidence of cholecystitis or biliary obstruction, patient is asymptomatic  -Elevated total bili appears chronic, improved with diuresis  -Monitor for now

## 2023-08-27 NOTE — CONSULT NOTE ADULT - ASSESSMENT
# bilateral pleural effusions  Patient initially presented with significant volume overload in the setting of HFpEF and severe MR/TR. BNP at [place] [facility], 862 yesterday, still elevated.  CXR from 8/24 with mild, at most moderate, unilateral pleural effusion.  Bedside ultrasound ***   - patient on coumadin, last INR 3.25 yesterday      # 1.4cm R paratracheal LN      INCOMPLETE NOTE # bilateral pleural effusions  Patient initially presented with significant volume overload in the setting of HFpEF and severe MR/TR. BNP at [place] , 862 yesterday, still elevated.  CXR from  with mild, at most moderate, unilateral pleural effusion. No newer imaging.  Bedside ultrasound without minimal residual pleural effusions, right somewhat greater than left but no discernable fluid pocket that would be safe to puncture. Basal consolidative pattern with fixed air bronchograms suggestive of atelectasis.  - discussed that bl pleff likely related to fluid overload as they practically resolved with diuresis  - currently no need for thoracentesis. Please call back if the patient's condition worsens.      # 1.4cm R paratracheal LN  This is a new finding of unclear acuity. She will need follow up imaging.    Needs outpatient pulmonary follow up upon discharge at [address] [place] [address], [address] ([phone]).  Please e-mail [email or web address] to make an appointment for the patient.  Please include the name, , and a phone number for you and the patient. Appointment with Dr. Palafox or associates.   # bilateral pleural effusions  Patient initially presented with significant volume overload in the setting of HFpEF and severe MR/TR. BNP at [place] , 862 yesterday, still elevated.  CXR from  with mild, at most moderate, unilateral pleural effusion. No newer imaging.  Bedside ultrasound without minimal residual pleural effusions, right somewhat greater than left but no discernable fluid pocket that would be safe to puncture. Basal consolidative pattern with fixed air bronchograms suggestive of atelectasis.  - discussed that bl pleff likely related to fluid overload as they practically resolved with diuresis  - currently no need for thoracentesis. Please call back if the patient's condition worsens.      # 1.4cm R paratracheal LN  This is a new finding of unclear acuity. She will need follow up imaging.    Needs outpatient pulmonary follow up upon discharge at [address] [place] [address], [address] ([phone]).  Please e-mail [email or web address] to make an appointment for the patient.  Please include the name, , and a phone number for you and the patient. Appointment with Dr. Palafox.

## 2023-08-27 NOTE — CONSULT NOTE ADULT - SUBJECTIVE AND OBJECTIVE BOX
CHIEF COMPLAINT:    HPI:    89yo F w/ PMH of HFpEF, severe MR, TR, AFib, mechanical AVR on coumadin, CAD, HTN, hypothyroidism.  She presented to [facility] with several weeks of progressive dyspnea, where the severe MR/TR were diagnosed. Diuretic therapy limited by hypotensive episodes.  She was also follow by pulmonology while inpatient for bilateral pleural effusions, R>L with concern for mild loculation on the left, felt to be most likely related to heart failure.  Patient transferred to [facility] for ongoing care.    Pulmonology consulted for evaluation of pleural effusion.    PAST MEDICAL & SURGICAL HISTORY:  S/P AVR      Atrial fibrillation      Hypothyroidism      History of appendectomy      S/P AVR          FAMILY HISTORY:      SOCIAL HISTORY:  Smoking: [ ] Never Smoked [ ] Former Smoker (__ packs x ___ years) [ ] Current Smoker  (__ packs x ___ years)  Substance Use: [ ] Never Used [ ] Used ____  EtOH Use:  Marital Status: [ ] Single [ ]  [ ]  [ ]   Sexual History:   Occupation:  Recent Travel:  Country of Birth:  Advance Directives:    Allergies    penicillin (Rash)  sulfa drugs (Rash)    Intolerances        HOME MEDICATIONS:  Home Medications:  aspirin 81 mg oral tablet: 1 tab(s) orally once a day (26 Aug 2023 08:29)  atorvastatin 20 mg oral tablet: 1 tab(s) orally once a day (at bedtime) (26 Aug 2023 08:29)  digoxin 62.5 mcg (0.0625 mg) oral tablet: 1 tab(s) orally once a day (26 Aug 2023 08:29)  levothyroxine 88 mcg (0.088 mg) oral tablet: 1 tab(s) orally once a day (26 Aug 2023 08:29)  meclizine 12.5 mg oral tablet: 1 orally once a day as needed for  dizziness (26 Aug 2023 08:29)  metoprolol succinate 50 mg oral tablet, extended release: 1 tab(s) orally once a day (26 Aug 2023 08:29)  Zetia 10 mg oral tablet: 1 tab(s) orally once a day (26 Aug 2023 08:29)      REVIEW OF SYSTEMS:  Constitutional: [ ] negative [ ] fevers [ ] chills [ ] weight loss [ ] weight gain  HEENT: [ ] negative [ ] dry eyes [ ] eye irritation [ ] postnasal drip [ ] nasal congestion  CV: [ ] negative  [ ] chest pain [ ] orthopnea [ ] palpitations [ ] murmur  Resp: [ ] negative [ ] cough [ ] shortness of breath [ ] dyspnea [ ] wheezing [ ] sputum [ ] hemoptysis  GI: [ ] negative [ ] nausea [ ] vomiting [ ] diarrhea [ ] constipation [ ] abd pain [ ] dysphagia   : [ ] negative [ ] dysuria [ ] nocturia [ ] hematuria [ ] increased urinary frequency  Musculoskeletal: [ ] negative [ ] back pain [ ] myalgias [ ] arthralgias [ ] fracture  Skin: [ ] negative [ ] rash [ ] itch  Neurological: [ ] negative [ ] headache [ ] dizziness [ ] syncope [ ] weakness [ ] numbness  Psychiatric: [ ] negative [ ] anxiety [ ] depression  Endocrine: [ ] negative [ ] diabetes [ ] thyroid problem  Hematologic/Lymphatic: [ ] negative [ ] anemia [ ] bleeding problem  Allergic/Immunologic: [ ] negative [ ] itchy eyes [ ] nasal discharge [ ] hives [ ] angioedema  [ ] All other systems negative  [ ] Unable to assess ROS because ________    OBJECTIVE:  ICU Vital Signs Last 24 Hrs  T(C): 36.7 (27 Aug 2023 05:34), Max: 36.7 (26 Aug 2023 20:41)  T(F): 98.1 (27 Aug 2023 05:34), Max: 98.1 (27 Aug 2023 05:34)  HR: 88 (27 Aug 2023 12:31) (79 - 90)  BP: 122/63 (27 Aug 2023 12:31) (115/76 - 137/68)  BP(mean): --  ABP: --  ABP(mean): --  RR: 18 (27 Aug 2023 12:31) (18 - 18)  SpO2: 96% (27 Aug 2023 12:31) (95% - 97%)    O2 Parameters below as of 27 Aug 2023 12:31  Patient On (Oxygen Delivery Method): nasal cannula  O2 Flow (L/min): 2            08-26 @ 07:01  -  08-27 @ 07:00  --------------------------------------------------------  IN: 900 mL / OUT: 800 mL / NET: 100 mL      CAPILLARY BLOOD GLUCOSE          PHYSICAL EXAM:  General:   HEENT:   Lymph Nodes:  Neck:   Respiratory:   Cardiovascular:   Abdomen:   Extremities:   Skin:   Neurological:  Psychiatry:    LINES:     HOSPITAL MEDICATIONS:  Standing Meds:  aspirin enteric coated 81 milliGRAM(s) Oral daily  atorvastatin 20 milliGRAM(s) Oral at bedtime  digoxin     Tablet 62.5 MICROGram(s) Oral daily  heparin  Infusion. 600 Unit(s)/Hr IV Continuous <Continuous>  levothyroxine 88 MICROGram(s) Oral daily  metoprolol succinate ER 25 milliGRAM(s) Oral daily  polyethylene glycol 3350 17 Gram(s) Oral daily      PRN Meds:  acetaminophen     Tablet .. 650 milliGRAM(s) Oral every 6 hours PRN  heparin   Injectable 4500 Unit(s) IV Push every 6 hours PRN  heparin   Injectable 2000 Unit(s) IV Push every 6 hours PRN  melatonin 3 milliGRAM(s) Oral at bedtime PRN      LABS:                        12.3   8.01  )-----------( 253      ( 27 Aug 2023 06:59 )             37.0     Hgb Trend: 12.3<--, 12.9<--, 12.6<--, 12.7<--, 12.4<--  08-27    133<L>  |  92<L>  |  19  ----------------------------<  100<H>  4.3   |  28  |  1.02    Ca    10.1      27 Aug 2023 06:57  Phos  3.3     08-26  Mg     2.1     08-27    TPro  7.6  /  Alb  3.8  /  TBili  1.4<H>  /  DBili  x   /  AST  39  /  ALT  26  /  AlkPhos  82  08-27    Creatinine Trend: 1.02<--, 1.08<--, 0.98<--, 1.00<--, 1.16<--, 1.16<--  PT/INR - ( 26 Aug 2023 07:19 )   PT: 33.0 sec;   INR: 3.25 ratio         PTT - ( 27 Aug 2023 07:00 )  PTT:86.3 sec  Urinalysis Basic - ( 27 Aug 2023 06:57 )    Color: x / Appearance: x / SG: x / pH: x  Gluc: 100 mg/dL / Ketone: x  / Bili: x / Urobili: x   Blood: x / Protein: x / Nitrite: x   Leuk Esterase: x / RBC: x / WBC x   Sq Epi: x / Non Sq Epi: x / Bacteria: x        Venous Blood Gas:  08-26 @ 07:20  7.47/47/49/34/81.1  VBG Lactate: 1.2      MICROBIOLOGY:       RADIOLOGY:  [ ] Reviewed and interpreted by me    PULMONARY FUNCTION TESTS:    EKG:   CHIEF COMPLAINT:    HPI:    87yo F w/ PMH of HFpEF, severe MR, TR, AFib, mechanical AVR on coumadin, CAD, HTN, hypothyroidism.  She presented to [facility] with several weeks of progressive dyspnea, where the severe MR/TR were diagnosed. Diuretic therapy limited by hypotensive episodes.  She was also follow by pulmonology while inpatient for bilateral pleural effusions, R>L with concern for mild loculation on the left, felt to be most likely related to heart failure.  Patient transferred to [facility] for ongoing care.    Pulmonology consulted for evaluation of pleural effusion.    When seen, patient endorses overall wellbeing. Her dyspnea has improved significantly since being admitted to [facility]. She denies significant cough or other symptoms except for L hip muscle cramps.    PAST MEDICAL & SURGICAL HISTORY:  S/P AVR      Atrial fibrillation      Hypothyroidism      History of appendectomy      S/P AVR          FAMILY HISTORY:        Allergies    penicillin (Rash)  sulfa drugs (Rash)    Intolerances        HOME MEDICATIONS:  Home Medications:  aspirin 81 mg oral tablet: 1 tab(s) orally once a day (26 Aug 2023 08:29)  atorvastatin 20 mg oral tablet: 1 tab(s) orally once a day (at bedtime) (26 Aug 2023 08:29)  digoxin 62.5 mcg (0.0625 mg) oral tablet: 1 tab(s) orally once a day (26 Aug 2023 08:29)  levothyroxine 88 mcg (0.088 mg) oral tablet: 1 tab(s) orally once a day (26 Aug 2023 08:29)  meclizine 12.5 mg oral tablet: 1 orally once a day as needed for  dizziness (26 Aug 2023 08:29)  metoprolol succinate 50 mg oral tablet, extended release: 1 tab(s) orally once a day (26 Aug 2023 08:29)  Zetia 10 mg oral tablet: 1 tab(s) orally once a day (26 Aug 2023 08:29)      REVIEW OF SYSTEMS:  Constitutional: [ x ] negative [ ] fevers [ ] chills [ ] weight loss [ ] weight gain  HEENT: [ x negative [ ] dry eyes [ ] eye irritation [ ] postnasal drip [ ] nasal congestion  CV: [ x ] negative  [ ] chest pain [ ] orthopnea [ ] palpitations [ ] murmur  Resp: [ x ] negative [ ] cough [ ] shortness of breath [ ] dyspnea [ ] wheezing [ ] sputum [ ] hemoptysis  GI: [ ] negative [ ] nausea [ ] vomiting [ ] diarrhea [ ] constipation [ ] abd pain [ ] dysphagia   : [ ] negative [ ] dysuria [ ] nocturia [ ] hematuria [ ] increased urinary frequency  Musculoskeletal: [ ] negative [ ] back pain [ ] myalgias [ ] arthralgias [ ] fracture  Skin: [ ] negative [ ] rash [ ] itch  Neurological: [ ] negative [ ] headache [ ] dizziness [ ] syncope [ ] weakness [ ] numbness  Psychiatric: [ ] negative [ ] anxiety [ ] depression  Endocrine: [ ] negative [ ] diabetes [ ] thyroid problem  Hematologic/Lymphatic: [ ] negative [ ] anemia [ ] bleeding problem  Allergic/Immunologic: [ ] negative [ ] itchy eyes [ ] nasal discharge [ ] hives [ ] angioedema  [ x ] All other systems negative  [ ] Unable to assess ROS because ________    OBJECTIVE:  ICU Vital Signs Last 24 Hrs  T(C): 36.7 (27 Aug 2023 05:34), Max: 36.7 (26 Aug 2023 20:41)  T(F): 98.1 (27 Aug 2023 05:34), Max: 98.1 (27 Aug 2023 05:34)  HR: 88 (27 Aug 2023 12:31) (79 - 90)  BP: 122/63 (27 Aug 2023 12:31) (115/76 - 137/68)  BP(mean): --  ABP: --  ABP(mean): --  RR: 18 (27 Aug 2023 12:31) (18 - 18)  SpO2: 96% (27 Aug 2023 12:31) (95% - 97%)    O2 Parameters below as of 27 Aug 2023 12:31  Patient On (Oxygen Delivery Method): nasal cannula  O2 Flow (L/min): 2            08-26 @ 07:01  -  08-27 @ 07:00  --------------------------------------------------------  IN: 900 mL / OUT: 800 mL / NET: 100 mL      CAPILLARY BLOOD GLUCOSE        PHYSICAL EXAM:  General: Well appearing female sitting comfortably in bed  HEENT: Normal sclera  Lymph Nodes: No LAD  Respiratory: Faint crackles in bilateral bases Right worse than left  Cardiovascular: Regular rate and rhythm no m/r/g  Abdomen: Nontender nondistended  Extremities: No peripheral edema  Skin: No rashes  Neurological: No appreciable neurologic deficits  Psychiatry: Appropriate mood and affect    LINES:     HOSPITAL MEDICATIONS:  Standing Meds:  aspirin enteric coated 81 milliGRAM(s) Oral daily  atorvastatin 20 milliGRAM(s) Oral at bedtime  digoxin     Tablet 62.5 MICROGram(s) Oral daily  heparin  Infusion. 600 Unit(s)/Hr IV Continuous <Continuous>  levothyroxine 88 MICROGram(s) Oral daily  metoprolol succinate ER 25 milliGRAM(s) Oral daily  polyethylene glycol 3350 17 Gram(s) Oral daily      PRN Meds:  acetaminophen     Tablet .. 650 milliGRAM(s) Oral every 6 hours PRN  heparin   Injectable 4500 Unit(s) IV Push every 6 hours PRN  heparin   Injectable 2000 Unit(s) IV Push every 6 hours PRN  melatonin 3 milliGRAM(s) Oral at bedtime PRN      LABS:                        12.3   8.01  )-----------( 253      ( 27 Aug 2023 06:59 )             37.0     Hgb Trend: 12.3<--, 12.9<--, 12.6<--, 12.7<--, 12.4<--  08-27    133<L>  |  92<L>  |  19  ----------------------------<  100<H>  4.3   |  28  |  1.02    Ca    10.1      27 Aug 2023 06:57  Phos  3.3     08-26  Mg     2.1     08-27    TPro  7.6  /  Alb  3.8  /  TBili  1.4<H>  /  DBili  x   /  AST  39  /  ALT  26  /  AlkPhos  82  08-27    Creatinine Trend: 1.02<--, 1.08<--, 0.98<--, 1.00<--, 1.16<--, 1.16<--  PT/INR - ( 26 Aug 2023 07:19 )   PT: 33.0 sec;   INR: 3.25 ratio         PTT - ( 27 Aug 2023 07:00 )  PTT:86.3 sec  Urinalysis Basic - ( 27 Aug 2023 06:57 )    Color: x / Appearance: x / SG: x / pH: x  Gluc: 100 mg/dL / Ketone: x  / Bili: x / Urobili: x   Blood: x / Protein: x / Nitrite: x   Leuk Esterase: x / RBC: x / WBC x   Sq Epi: x / Non Sq Epi: x / Bacteria: x        Venous Blood Gas:  08-26 @ 07:20  7.47/47/49/34/81.1  VBG Lactate: 1.2      MICROBIOLOGY:       RADIOLOGY:  [ ] Reviewed and interpreted by me    PULMONARY FUNCTION TESTS:    EKG:

## 2023-08-27 NOTE — CONSULT NOTE ADULT - ASSESSMENT
Impression:  severe TR  moderate pHTN     Plan:  - Structural heart eval/consult tomorrow/Monday 88 year old female with past medical history of Afib and Mechanical AVR ([place] [facility]) on coumadin, CAD, HTN/HLD, hypothyroidism, history of cholelithiasis/acute cholecystitis (managed conservatively 8/2022), who initially presents from home with adHF, likely 2/2 valvular pathology.  OSH TTE with preserved EF but significant MR and TR.     Impression:  HFpEF  Afib/Flutter?, now fib with rate control  severe TR  moderate pHTN   Bilateral pleural effusions (R>L) w/ hypoxemic respiratory failure (off oxygen)    Plan:  - Repeat TTE now that patient is 1) euvolemic and 2) rates are improved   - Structural heart eval/consult tomorrow/Monday and additional CT imaging as requested by that service   - Regarding afib, which could be contributing to decompensation, would continue hep gtt bridging ([id number] = 5). DCCV can be considered (no interruption of AC) cited, but would wait on TTE. Would dc digoxin as no mortality benefit and uptitrate metoprolol as needed (can start with metoprolol tartrate 25mg bid)   - Lasix 40mg PO daily (maintenance) can be initiated tomorrow  88 year old female with past medical history of Afib and Mechanical AVR ([facility] [facility]) on coumadin, CAD, HTN/HLD, hypothyroidism, history of cholelithiasis/acute cholecystitis (managed conservatively 8/2022), who initially presents from home with adHF, likely 2/2 valvular pathology.  OSH TTE with preserved EF but significant MR and TR. Imaging with pleural effusions (mild loculation on the right) which is likely cardiac in etiology. She has since been diuresed to clinical euvolemia.     Impression:  HFpEF  Afib/Flutter?, now fib with rate control  severe TR  moderate pHTN   Bilateral pleural effusions (R>L) w/ hypoxemic respiratory failure (off oxygen)    Plan:  - Repeat TTE now that patient is 1) euvolemic and 2) rates are improved   - Structural heart eval/consult tomorrow/Monday and additional CT imaging as requested by that service   - Regarding afib, which could be contributing to decompensation, would continue hep gtt bridging ([id number] = 5). DCCV can be considered (no interruption of AC presumably), but would wait on TTE. Would dc digoxin as no mortality benefit and uptitrate metoprolol as needed (can start with metoprolol tartrate 25mg bid)   - Lasix 40mg PO daily (maintenance) can be initiated tomorrow  88 year old female with past medical history of Afib and Mechanical AVR ([facility] [facility]) on coumadin, CAD, HTN/HLD, hypothyroidism, history of cholelithiasis/acute cholecystitis (managed conservatively 8/2022), who initially presents from home with adHF, likely 2/2 valvular pathology.  OSH TTE with preserved EF but significant MR and TR. Imaging with pleural effusions (mild loculation on the right) which is likely cardiac in etiology. She has since been diuresed to clinical euvolemia.     Impression:  HFpEF  Afib/Flutter?, now fib with rate control  severe TR  moderate pHTN   Bilateral pleural effusions (R>L) w/ hypoxemic respiratory failure (off oxygen)    Plan:  - Repeat TTE now that patient is 1) euvolemic and 2) rates are improved   - Structural heart eval/consult tomorrow/Monday and additional CT imaging as requested by that service   - Regarding afib, which could be contributing to decompensation, would continue hep gtt bridging ([id number] = 5). DCCV can be considered (no interruption of AC presumably), but would wait on TTE.  Noted thyroid derangements that may preclude AAD w/ amio. Would dc digoxin as no mortality benefit and uptitrate metoprolol as needed (can start with metoprolol tartrate 25mg bid)   - Lasix 40mg PO daily (maintenance) can be initiated tomorrow    88 year old female with past medical history of Afib and Mechanical AVR ([facility]) on coumadin, CAD, HTN/HLD, hypothyroidism, history of cholelithiasis/acute cholecystitis (managed conservatively 8/2022), who initially presents from home with adHF, likely 2/2 valvular pathology.  OSH TTE with preserved EF but significant MR and TR. Imaging with pleural effusions (mild loculation on the right) which is likely cardiac in etiology. She has since been diuresed to clinical euvolemia.     Impression:  HFpEF  Afib/Flutter?, now fib with rate control  severe TR  moderate pHTN   Bilateral pleural effusions (R>L) w/ hypoxemic respiratory failure (off oxygen)    Plan:  - Please repeat ECG   - Repeat TTE now that patient is 1) euvolemic and 2) rates are improved   - Structural heart eval/consult tomorrow/Monday and additional CT imaging as requested by that service   - Regarding afib, which could be contributing to decompensation, would continue hep gtt bridging ([id number] = 5). DCCV can be considered (no interruption of AC presumably), but would wait on TTE.  Noted thyroid derangements that may preclude AAD w/ amio. Would dc digoxin as no mortality benefit and uptitrate metoprolol as needed (can start with metoprolol tartrate 25mg bid)   - Lasix 40mg PO daily (maintenance) can be initiated tomorrow

## 2023-08-27 NOTE — CONSULT NOTE ADULT - SUBJECTIVE AND OBJECTIVE BOX
CARDIOLOGY FELLOW CONSULT NOTE    HPI:  88 year old female with past medical history of Afib and Mechanical AVR ([facility]) on coumadin, CAD, HTN/HLD, hypothyroidism, history of cholelithiasis/acute cholecystitis (managed conservatively 8/2022), who initially presents from home with few week history of worsening shortness of breath. At [place] found to have acute on chronic diastolic heart failure due to severe MR/TR, NSTEMI type 2. Managed with IV lasix - BP did not tolerate bid, tx here on 40mg IV daily. Additionally, patient with b/l pleural effusions - mild loculated R effusion + 1.4cm paratracheal LN - seen by pulm - impression that bilateral pleural effusions were cardiac in origin and breathing improved on diuretics. Notably, with enlarged LN and partial loculated effusion on right side could be suggestive of underlying CA (small-moderate risk) for which follow up recommended. Meanwhile, family declined thoracentesis due to transfer to [place].   Patient also with persistent aflutter with RVR, was initiated on digoxin, had supratherapeutic levels so held and restarted. Was also bridged to coumadin, now on hold on heparin gtt in preparation for potential intervention by struc heart..  TTE 8/20 showed EF 60-65%, severe MR, severe TR, moderate pulm HTN.  Here - patient saturating 99% on 2L, comfortable.    VS HR 70s-90s BP 110s-130s/60s-70s RR 18 satting 95-99% 2L NC  133/4.3 | 92/28 | 19/1.02 < 100 Ca 10.1 Mg 2.1  39/26 | 82 | 1.4 | 7.6/3.8  8.01 < 12.3 > 253  aPTT 86.3  proBNP 862  Lactate 1.3    I was informed that family is requesting second opinion. She follows originally with Dr. Alonzo of [facility] cardiology [facility].    PMHx:   S/P AVR  Atrial fibrillation  Hypothyroidism    PSHx:   History of appendectomy    S/P AVR    Allergies:  penicillin (Rash)  sulfa drugs (Rash)    Current Medications:   acetaminophen     Tablet .. 650 milliGRAM(s) Oral every 6 hours PRN  aspirin enteric coated 81 milliGRAM(s) Oral daily  atorvastatin 20 milliGRAM(s) Oral at bedtime  digoxin     Tablet 62.5 MICROGram(s) Oral daily  heparin   Injectable 4500 Unit(s) IV Push every 6 hours PRN  heparin   Injectable 2000 Unit(s) IV Push every 6 hours PRN  heparin  Infusion. 600 Unit(s)/Hr IV Continuous <Continuous>  levothyroxine 88 MICROGram(s) Oral daily  melatonin 3 milliGRAM(s) Oral at bedtime PRN  metoprolol succinate ER 25 milliGRAM(s) Oral daily  polyethylene glycol 3350 17 Gram(s) Oral daily    FAMILY HISTORY:    Social History:  Smoking History:  Alcohol Use:  Drug Use:    REVIEW OF SYSTEMS:  CONSTITUTIONAL: No weakness, fevers or chills  EYES/ENT: No visual changes;  No dysphagia  NECK: No pain or stiffness  RESPIRATORY: No cough, wheezing, hemoptysis; No shortness of breath  CARDIOVASCULAR: No chest pain or palpitations; No lower extremity edema  GASTROINTESTINAL: No abdominal or epigastric pain. No nausea, vomiting, or hematemesis; No diarrhea or constipation. No melena or hematochezia.  BACK: No back pain  GENITOURINARY: No dysuria, frequency or hematuria  NEUROLOGICAL: No numbness or weakness  SKIN: No itching, burning, rashes, or lesions   All other review of systems is negative unless indicated above.    Physical Exam:  T(F): 98.1 (08-27), Max: 98.1 (08-27)  HR: 88 (08-27) (79 - 90)  BP: 122/63 (08-27) (115/76 - 137/68)  RR: 18 (08-27)  SpO2: 96% (08-27)  GENERAL: No acute distress, well-developed  HEAD:  Atraumatic, Normocephalic  ENT: EOMI, PERRLA, conjunctiva and sclera clear, Neck supple, No JVD, moist mucosa  CHEST/LUNG: Clear to auscultation bilaterally; No wheeze, equal breath sounds bilaterally   BACK: No spinal tenderness  HEART: Regular rate and rhythm; No murmurs, rubs, or gallops  ABDOMEN: Soft, Nontender, Nondistended; Bowel sounds present  EXTREMITIES:  No clubbing, cyanosis, or edema  PSYCH: Nl behavior, nl affect  NEUROLOGY: AAOx3, non-focal, cranial nerves intact  SKIN: Normal color, No rashes or lesions  LINES:    ECG GC : AF with rates in the 100s  New ECG has been requested    TTE OSH:    8/20 TTE  Summary:   1. Normal global left ventricular systolic function.   2. Left ventricular ejection fraction, by visual estimation, is 60 to  65%.   3. Abnormal septal motion consistent with post-operative status.   4. There is mild septal left ventricular hypertrophy.   5. The mitral in-flow pattern reveals no discernable A-wave, therefore  no comment on diastolic function can be made.   6. Left ventricle chordal calcification.   7. Normal right ventricular size and function.   8. Left atrial enlargement.   9. Right atrial enlargement.  10. Degenerative mitral valve.  11. Mild mitral annular calcification.  12. Moderate thickening and calcification of the anterior and posterior  mitral valve leaflets.  13. Severe mitral valve regurgitation.  14. Borderline elevated mitral valve mean gradient 5 mmHg at  BPM.  15. Severe tricuspid regurgitation.  16. There is a mechanical valve in the aortic position, appears well  seated with peak velocity within normal limits.  17. Moderate pleural effusion in both left and right lateral regions.  18. Estimated pulmonary artery systolic pressure is 50.5 mmHg assuming a  right atrial pressureof 15 mmHg, which is consistent with moderate  pulmonary hypertension.  19. There is no evidence of pericardial effusion.    Labs: Personally reviewed                        12.3   8.01  )-----------( 253      ( 27 Aug 2023 06:59 )             37.0     08-27    133<L>  |  92<L>  |  19  ----------------------------<  100<H>  4.3   |  28  |  1.02    Ca    10.1      27 Aug 2023 06:57  Phos  3.3     08-26  Mg     2.1     08-27    TPro  7.6  /  Alb  3.8  /  TBili  1.4<H>  /  DBili  x   /  AST  39  /  ALT  26  /  AlkPhos  82  08-27    PT/INR - ( 26 Aug 2023 07:19 )   PT: 33.0 sec;   INR: 3.25 ratio         PTT - ( 27 Aug 2023 07:00 )  PTT:86.3 sec    Thyroid Stimulating Hormone, Serum: 9.724 uIU/mL (08-20 @ 16:00) CARDIOLOGY FELLOW CONSULT NOTE    HPI:  88 year old female with past medical history of Afib and Mechanical AVR ([facility]) on coumadin, CAD, HTN/HLD, hypothyroidism, history of cholelithiasis/acute cholecystitis (managed conservatively 8/2022), who initially presents from home with few week history of worsening shortness of breath. On my history, it appears her decompensation happened slowly over the past several months starting in January, when her  passed away. She neglected her own care and minimized symptoms, including her worsening ANIYA, orthopnea, and decreased exercise tolerance (which used to be at least 2-3 blocks ambulatory to now about a 1/4th block). Denied any chest pain, overall compliant with Coumadin.   At [place] found to have acute on chronic diastolic heart failure due to severe MR/TR, NSTEMI type 2. Managed with IV lasix - BP did not tolerate bid, tx here on 40mg IV daily. Additionally, patient with b/l pleural effusions - mild loculated R effusion + 1.4cm paratracheal LN - seen by pulm - impression that bilateral pleural effusions were cardiac in origin and breathing improved on diuretics. Notably, with enlarged LN and partial loculated effusion on right side could be suggestive of underlying CA (small-moderate risk) for which follow up recommended. Meanwhile, family declined thoracentesis due to transfer to [place].   Patient also with persistent aflutter with RVR, was initiated on digoxin, had supratherapeutic levels so held and restarted. Was also bridged to coumadin, now on hold on heparin gtt in preparation for potential intervention by struc heart..  TTE 8/20 showed EF 60-65%, severe MR, severe TR, moderate pulm HTN.  Here - patient saturating 99% on 2L, comfortable.    VS HR 70s-90s BP 110s-130s/60s-70s RR 18 satting 95-99% 2L NC  133/4.3 | 92/28 | 19/1.02 < 100 Ca 10.1 Mg 2.1  39/26 | 82 | 1.4 | 7.6/3.8  8.01 < 12.3 > 253  aPTT 86.3  proBNP 862  Lactate 1.3    I was informed that family is requesting second opinion. She follows originally with Dr. Alonzo of [facility] cardiology [facility].    PMHx:   S/P AVR  Atrial fibrillation  Hypothyroidism    PSHx:   History of appendectomy    S/P AVR    Allergies:  penicillin (Rash)  sulfa drugs (Rash)    Current Medications:   acetaminophen     Tablet .. 650 milliGRAM(s) Oral every 6 hours PRN  aspirin enteric coated 81 milliGRAM(s) Oral daily  atorvastatin 20 milliGRAM(s) Oral at bedtime  digoxin     Tablet 62.5 MICROGram(s) Oral daily  heparin   Injectable 4500 Unit(s) IV Push every 6 hours PRN  heparin   Injectable 2000 Unit(s) IV Push every 6 hours PRN  heparin  Infusion. 600 Unit(s)/Hr IV Continuous <Continuous>  levothyroxine 88 MICROGram(s) Oral daily  melatonin 3 milliGRAM(s) Oral at bedtime PRN  metoprolol succinate ER 25 milliGRAM(s) Oral daily  polyethylene glycol 3350 17 Gram(s) Oral daily    REVIEW OF SYSTEMS:  CONSTITUTIONAL: No weakness, fevers or chills  EYES/ENT: No visual changes;  No dysphagia  NECK: No pain or stiffness  RESPIRATORY: No cough, wheezing, hemoptysis; No shortness of breath  CARDIOVASCULAR: No chest pain or palpitations; No lower extremity edema  GASTROINTESTINAL: No abdominal or epigastric pain. No nausea, vomiting, or hematemesis; No diarrhea or constipation. No melena or hematochezia.  BACK: No back pain  GENITOURINARY: No dysuria, frequency or hematuria  NEUROLOGICAL: No numbness or weakness  SKIN: No itching, burning, rashes, or lesions   All other review of systems is negative unless indicated above.    Physical Exam:  T(F): 98.1 (08-27), Max: 98.1 (08-27)  HR: 88 (08-27) (79 - 90)  BP: 122/63 (08-27) (115/76 - 137/68)  RR: 18 (08-27)  SpO2: 96% (08-27)  GENERAL: No acute distress, well-developed  HEAD:  Atraumatic, Normocephalic  ENT: EOMI, PERRLA, conjunctiva and sclera clear, Neck supple, No JVD, moist mucosa  CHEST/LUNG: Clear to auscultation bilaterally; No wheeze, equal breath sounds bilaterally   BACK: No spinal tenderness  HEART: Irregularly irregular. Aortic ejection click. 3/6 LOLI, holosystolic at apex   ABDOMEN: Soft, Nontender, Nondistended; Bowel sounds present  EXTREMITIES:  No clubbing, cyanosis, or edema  PSYCH: Nl behavior, nl affect  NEUROLOGY: AAOx3, non-focal, cranial nerves intact  SKIN: Normal color, No rashes or lesions    ECG GC : AF with rates in the 100s  New ECG has been requested    TTE OSH:    8/20 TTE  Summary:   1. Normal global left ventricular systolic function.   2. Left ventricular ejection fraction, by visual estimation, is 60 to  65%.   3. Abnormal septal motion consistent with post-operative status.   4. There is mild septal left ventricular hypertrophy.   5. The mitral in-flow pattern reveals no discernable A-wave, therefore  no comment on diastolic function can be made.   6. Left ventricle chordal calcification.   7. Normal right ventricular size and function.   8. Left atrial enlargement.   9. Right atrial enlargement.  10. Degenerative mitral valve.  11. Mild mitral annular calcification.  12. Moderate thickening and calcification of the anterior and posterior  mitral valve leaflets.  13. Severe mitral valve regurgitation.  14. Borderline elevated mitral valve mean gradient 5 mmHg at  BPM.  15. Severe tricuspid regurgitation.  16. There is a mechanical valve in the aortic position, appears well  seated with peak velocity within normal limits.  17. Moderate pleural effusion in both left and right lateral regions.  18. Estimated pulmonary artery systolic pressure is 50.5 mmHg assuming a  right atrial pressureof 15 mmHg, which is consistent with moderate  pulmonary hypertension.  19. There is no evidence of pericardial effusion.    Labs: Personally reviewed                        12.3   8.01  )-----------( 253      ( 27 Aug 2023 06:59 )             37.0     08-27    133<L>  |  92<L>  |  19  ----------------------------<  100<H>  4.3   |  28  |  1.02    Ca    10.1      27 Aug 2023 06:57  Phos  3.3     08-26  Mg     2.1     08-27    TPro  7.6  /  Alb  3.8  /  TBili  1.4<H>  /  DBili  x   /  AST  39  /  ALT  26  /  AlkPhos  82  08-27    PT/INR - ( 26 Aug 2023 07:19 )   PT: 33.0 sec;   INR: 3.25 ratio         PTT - ( 27 Aug 2023 07:00 )  PTT:86.3 sec    Thyroid Stimulating Hormone, Serum: 9.724 uIU/mL (08-20 @ 16:00) CARDIOLOGY FELLOW CONSULT NOTE    HPI:  88 year old female with past medical history of Afib and Mechanical AVR ([facility]) on coumadin, CAD, HTN/HLD, hypothyroidism, history of cholelithiasis/acute cholecystitis (managed conservatively 8/2022), who initially presents from home with few week history of worsening shortness of breath. On my history, it appears her decompensation happened slowly over the past several months starting in January, when her  passed away. She neglected her own care and minimized symptoms, including her worsening ANIYA, orthopnea, and decreased exercise tolerance (which used to be at least 2-3 blocks ambulatory to now about a 1/4th block). Denied any chest pain, overall compliant with Coumadin.   At [place] found to have acute on chronic diastolic heart failure due to severe MR/TR, NSTEMI type 2. Managed with IV lasix - BP did not tolerate bid, tx here on 40mg IV daily. Additionally, patient with b/l pleural effusions - mild loculated R effusion + 1.4cm paratracheal LN - seen by pulm - impression that bilateral pleural effusions were cardiac in origin and breathing improved on diuretics. Notably, with enlarged LN and partial loculated effusion on right side could be suggestive of underlying CA (small-moderate risk) for which follow up recommended. Meanwhile, family declined thoracentesis due to transfer to [place].   Patient also with persistent aflutter with RVR, was initiated on digoxin, had supratherapeutic levels so held and restarted. Was also bridged to coumadin, now on hold on heparin gtt in preparation for potential intervention by struc heart..  TTE 8/20 showed EF 60-65%, severe MR, severe TR, moderate pulm HTN.  Here - patient saturating 99% on 2L, comfortable.    VS HR 70s-90s BP 110s-130s/60s-70s RR 18 satting 95-99% 2L NC  133/4.3 | 92/28 | 19/1.02 < 100 Ca 10.1 Mg 2.1  39/26 | 82 | 1.4 | 7.6/3.8  8.01 < 12.3 > 253  aPTT 86.3  proBNP 862  Lactate 1.3    I was informed that family is requesting second opinion. She follows originally with Dr. Alonzo of [facility] cardiology [facility].    PMHx:   S/P AVR  Atrial fibrillation  Hypothyroidism    PSHx:   History of appendectomy    S/P AVR    Allergies:  penicillin (Rash)  sulfa drugs (Rash)    Current Medications:   acetaminophen     Tablet .. 650 milliGRAM(s) Oral every 6 hours PRN  aspirin enteric coated 81 milliGRAM(s) Oral daily  atorvastatin 20 milliGRAM(s) Oral at bedtime  digoxin     Tablet 62.5 MICROGram(s) Oral daily  heparin   Injectable 4500 Unit(s) IV Push every 6 hours PRN  heparin   Injectable 2000 Unit(s) IV Push every 6 hours PRN  heparin  Infusion. 600 Unit(s)/Hr IV Continuous <Continuous>  levothyroxine 88 MICROGram(s) Oral daily  melatonin 3 milliGRAM(s) Oral at bedtime PRN  metoprolol succinate ER 25 milliGRAM(s) Oral daily  polyethylene glycol 3350 17 Gram(s) Oral daily    REVIEW OF SYSTEMS:  CONSTITUTIONAL: No weakness, fevers or chills  EYES/ENT: No visual changes;  No dysphagia  NECK: No pain or stiffness  RESPIRATORY: No cough, wheezing, hemoptysis; No shortness of breath  CARDIOVASCULAR: No chest pain or palpitations; No lower extremity edema  GASTROINTESTINAL: No abdominal or epigastric pain. No nausea, vomiting, or hematemesis; No diarrhea or constipation. No melena or hematochezia.  BACK: No back pain  GENITOURINARY: No dysuria, frequency or hematuria  NEUROLOGICAL: No numbness or weakness  SKIN: No itching, burning, rashes, or lesions   All other review of systems is negative unless indicated above.    Physical Exam:  T(F): 98.1 (08-27), Max: 98.1 (08-27)  HR: 88 (08-27) (79 - 90)  BP: 122/63 (08-27) (115/76 - 137/68)  RR: 18 (08-27)  SpO2: 96% (08-27)  GENERAL: No acute distress, well-developed  HEAD:  Atraumatic, Normocephalic  ENT: EOMI, PERRLA, conjunctiva and sclera clear, Neck supple, No JVD, moist mucosa  CHEST/LUNG: Clear to auscultation bilaterally; No wheeze, equal breath sounds bilaterally   BACK: No spinal tenderness  HEART: Irregularly irregular. Aortic ejection click. 3/6 LOLI, holosystolic at apex   ABDOMEN: Soft, Nontender, Nondistended; Bowel sounds present  EXTREMITIES:  No clubbing, cyanosis, or edema  PSYCH: Nl behavior, nl affect  NEUROLOGY: AAOx3, non-focal, cranial nerves intact  SKIN: Normal color, No rashes or lesions    ECG GC : AF with rates in the 100s  New ECG has been requested    TTE OSH:    CT scan  Mildly loculated moderate right pleural effusion. Small layering left   pleural effusion. Atelectasis/consolidation of right middle lobe, basilar   right lower lobe and anterobasilar left lower lobe.    8/20 TTE  Summary:   1. Normal global left ventricular systolic function.   2. Left ventricular ejection fraction, by visual estimation, is 60 to  65%.   3. Abnormal septal motion consistent with post-operative status.   4. There is mild septal left ventricular hypertrophy.   5. The mitral in-flow pattern reveals no discernable A-wave, therefore  no comment on diastolic function can be made.   6. Left ventricle chordal calcification.   7. Normal right ventricular size and function.   8. Left atrial enlargement.   9. Right atrial enlargement.  10. Degenerative mitral valve.  11. Mild mitral annular calcification.  12. Moderate thickening and calcification of the anterior and posterior  mitral valve leaflets.  13. Severe mitral valve regurgitation.  14. Borderline elevated mitral valve mean gradient 5 mmHg at  BPM.  15. Severe tricuspid regurgitation.  16. There is a mechanical valve in the aortic position, appears well  seated with peak velocity within normal limits.  17. Moderate pleural effusion in both left and right lateral regions.  18. Estimated pulmonary artery systolic pressure is 50.5 mmHg assuming a  right atrial pressureof 15 mmHg, which is consistent with moderate  pulmonary hypertension.  19. There is no evidence of pericardial effusion.    Labs: Personally reviewed                        12.3   8.01  )-----------( 253      ( 27 Aug 2023 06:59 )             37.0     08-27    133<L>  |  92<L>  |  19  ----------------------------<  100<H>  4.3   |  28  |  1.02    Ca    10.1      27 Aug 2023 06:57  Phos  3.3     08-26  Mg     2.1     08-27    TPro  7.6  /  Alb  3.8  /  TBili  1.4<H>  /  DBili  x   /  AST  39  /  ALT  26  /  AlkPhos  82  08-27    PT/INR - ( 26 Aug 2023 07:19 )   PT: 33.0 sec;   INR: 3.25 ratio         PTT - ( 27 Aug 2023 07:00 )  PTT:86.3 sec    Thyroid Stimulating Hormone, Serum: 9.724 uIU/mL (08-20 @ 16:00)

## 2023-08-28 NOTE — PHYSICAL THERAPY INITIAL EVALUATION ADULT - ADDITIONAL COMMENTS
Patient lives alone in a private house with 3 steps to enter w/B/L hand rails. Patient reports, PTS, she was independent in ADLs & mobility w/o any AD; did not report any hx of fall 2/2 LOB.

## 2023-08-28 NOTE — DIETITIAN INITIAL EVALUATION ADULT - PERTINENT MEDS FT
MEDICATIONS  (STANDING):  acetaminophen     Tablet .. 650 milliGRAM(s) Oral every 6 hours  aspirin enteric coated 81 milliGRAM(s) Oral daily  atorvastatin 20 milliGRAM(s) Oral at bedtime  heparin  Infusion. 600 Unit(s)/Hr (6 mL/Hr) IV Continuous <Continuous>  levothyroxine 88 MICROGram(s) Oral daily  metoprolol tartrate 25 milliGRAM(s) Oral two times a day  polyethylene glycol 3350 17 Gram(s) Oral daily    MEDICATIONS  (PRN):  cyclobenzaprine 5 milliGRAM(s) Oral three times a day PRN Muscle Spasm  heparin   Injectable 4500 Unit(s) IV Push every 6 hours PRN For aPTT less than 40  heparin   Injectable 2000 Unit(s) IV Push every 6 hours PRN For aPTT between 40 - 57  melatonin 3 milliGRAM(s) Oral at bedtime PRN Insomnia

## 2023-08-28 NOTE — DIETITIAN INITIAL EVALUATION ADULT - ENTER TO (G/KG)
1.4 Tremfya Pregnancy And Lactation Text: The risk during pregnancy and breastfeeding is uncertain with this medication.

## 2023-08-28 NOTE — DIETITIAN INITIAL EVALUATION ADULT - PROBLEM SELECTOR PLAN 2
Currently in afib   Monitor on tele  on heparin gtt - was therapeutic on coumadin but now holding for potential intervention by struc heart  c/w dig and metoprolol 25mg - can increase dose  Cards eval pending - consider EP for ablation if aflutter    Mechanical AVR - c/w heparin

## 2023-08-28 NOTE — DIETITIAN INITIAL EVALUATION ADULT - REASON INDICATOR FOR ASSESSMENT
Pt seen for consult MST score 2/>. Information obtained from pt, RN, electronic medical record. Chart reviewed, events noted.

## 2023-08-28 NOTE — PHARMACOTHERAPY INTERVENTION NOTE - COMMENTS
Confirmed home medications with patient and pharmacy, updated in Outpatient Medication Review.    changed:  atorvastatin 20mg tab: one tablet by mouth everyday at bedtime to rosuvastatin 20mg tablet: one tablet by mouth everyday    added:   folic acid 1 mg oral tablet: 1 tab(s) orally once a day   warfarin 2.5 mg oral tablet: 1 tab(s) orally every other day; alternate with 3mg   warfarin 3 mg oral tablet: 1 tab(s) orally every other day; alternate with 2.5mg     removed:  digoxin 0.0625mg tablet: 1 tablet by mouth everyday  meclizine 12.5mg oral tablet: one tablet by mouth everyday as needed for dizziness    Cody Enciso, Pharm D. Candidate       Confirmed home medications with patient and pharmacy, updated in Outpatient Medication Review.    Changed:  atorvastatin 20mg tab: one tablet by mouth every day at bedtime to rosuvastatin 20mg tablet: one tablet by mouth every day    Added:   folic acid 1 mg oral tablet: 1 tab(s) orally once a day   warfarin 2.5 mg oral tablet: 1 tab(s) orally every other day; alternate with 3mg   warfarin 3 mg oral tablet: 1 tab(s) orally every other day; alternate with 2.5mg     Removed:  digoxin 0.0625mg tablet: 1 tablet by mouth every day  meclizine 12.5mg oral tablet: one tablet by mouth every day as needed for dizziness    Cody Enciso, Pharm D. Candidate    Dean WongD, [facility]  Clinical Pharmacy Specialist  [phone] or Teams

## 2023-08-28 NOTE — PROGRESS NOTE ADULT - PROBLEM SELECTOR PLAN 3
-CT showed mildly loculated moderate right pleural effusion, small layering left pleural effusion, atelectasis/consolidation of right middle lobe, basilar right lower lobe and anterobasilar left lower lobe; mildly enlarged R lower paratracheal lymph node 1.4cm   Seen by pulm at  -   -Repeat CXR 8/21 with improvement  Per  chart - pulm d/w daughter and patient on 8/23 that b.l pl effusions likely cardiogenic in origin and that her breathing now better with diuretics suggestive of cardiogenic in origin, Discussed having small loculation on CT is a non specific finding in a patient who had open heart surgery, however with having enlarged lymph node and partial loculated pleural effusion together on same side can be suggestive of underlying process's like cancer being present. Probability is small-moderate risk and would need follow up.  Discussed 2 paths forward thoracentesis while at  but will need to be off a/c vs repeat imaging in few weeks  Now transferred to [facility] - evaluated by pulm for possible thoracentesis. Per pulm: Bedside ultrasound showed minimal effusion present that would not be amenable to thoracentesis.  Patient will need follow-up Chest CT 6 weeks from the most recent.

## 2023-08-28 NOTE — PHYSICAL THERAPY INITIAL EVALUATION ADULT - GENERAL OBSERVATIONS, REHAB EVAL
Pt. rec'd in bed, NAD, +IV (Heparin) RT UE, +tele, daughter at the bedside, pt. agreeable to PT felisa.

## 2023-08-28 NOTE — DIETITIAN INITIAL EVALUATION ADULT - PROBLEM SELECTOR PLAN 1
Acute CHF 2/2 Severe MR/TR  Elevated Troponin likely NSTEMI type 2 in setting of afib with RVR and acute on chronic diastolic heart failure  -TTE 8/20 with EF 60-65%, severe MR and TR, moderate pulm HTN  -Will hold Lasix today 8/26 - patient dry, no ANIYA, no JVD, per daughter with dizziness, standing up.   Will need to restart lasix - was getting 40mg IVP daily, (when increased from daily to BID 8/21 BP did not tolerate). Consider every other day.   -Strict in's and out's, daily weights    Structural Heart Eval pending     AVR - c/w heparin

## 2023-08-28 NOTE — PHYSICAL THERAPY INITIAL EVALUATION ADULT - MANUAL MUSCLE TESTING RESULTS, REHAB EVAL
B/L 3+/5, RLE 3/5; LT ankle 3+/5, hip/knee unable to formally test 2/2 pain aggravation & dec participation 2/2 pain/grossly assessed due to
stated

## 2023-08-28 NOTE — PHYSICAL THERAPY INITIAL EVALUATION ADULT - GAIT DEVIATIONS NOTED, PT EVAL
decreased dedra/decreased velocity of limb motion/decreased step length/decreased weight-shifting ability

## 2023-08-28 NOTE — DIETITIAN INITIAL EVALUATION ADULT - ADD RECOMMEND
-- Recommend multivitamins/minerals, pending no medical contraindications, for micronutrient support.   -- Monitor PO intake, GI tolerance, skin integrity, labs, weight, and bowel movement regularity.   -- Will continue to honor food and beverage preferences within diet restriction of patient in an effort to maximize level of nutrient intake.  -- Assist with meals PRN and encourage PO intake.  -- Malnutrition alert placed in chart.

## 2023-08-28 NOTE — DIETITIAN NUTRITION RISK NOTIFICATION - TREATMENT: THE FOLLOWING DIET HAS BEEN RECOMMENDED
Diet, Regular:   Low Sodium  Supplement Feeding Modality:  Oral  Ensure Plus High Protein Cans or Servings Per Day:  1       Frequency:  Daily (08-28-23 @ 17:07) [Pending Verification By Attending]  Diet, DASH/TLC:   Sodium & Cholesterol Restricted  Supplement Feeding Modality:  Oral  Ensure Plus High Protein Cans or Servings Per Day:  1       Frequency:  Daily (08-26-23 @ 07:48) [Active]

## 2023-08-28 NOTE — DIETITIAN INITIAL EVALUATION ADULT - PERTINENT LABORATORY DATA
08-28    128<L>  |  91<L>  |  20  ----------------------------<  106<H>  4.2   |  27  |  1.03    Ca    9.7      28 Aug 2023 06:13  Mg     2.1     08-27    TPro  7.6  /  Alb  3.8  /  TBili  1.4<H>  /  DBili  x   /  AST  39  /  ALT  26  /  AlkPhos  82  08-27

## 2023-08-28 NOTE — PHYSICAL THERAPY INITIAL EVALUATION ADULT - GAIT PATTERN USED, PT EVAL
LV 12/04/20    Received fax requesting refill for levocetrizine dihydrochloride tabs 5mg  90 day supply TBA 3-point gait

## 2023-08-28 NOTE — PHYSICAL THERAPY INITIAL EVALUATION ADULT - PLANNED THERAPY INTERVENTIONS, PT EVAL
GOAL: Patient will be able to negotiate 3 steps in 2 weeks/bed mobility training/gait training/strengthening/transfer training

## 2023-08-28 NOTE — DIETITIAN INITIAL EVALUATION ADULT - DIET TYPE
recommend to d/c DASH, liberalize diet to low Na to encourage PO intake. RD remains available to adjust diet as needed./low sodium

## 2023-08-28 NOTE — CHART NOTE - NSCHARTNOTEFT_GEN_A_CORE
Pt told RN that she had not urinated in 24hrs, a bladder scan was done and showed >600cc, Straight cath was done and 600cc was drained.    A/P:  Monitor for UOP  Make sure patient is given opportunity to use the bathroom  Bladder scan Q 8 x3 and straight cath PRN

## 2023-08-28 NOTE — DIETITIAN INITIAL EVALUATION ADULT - OTHER INFO
-- Pt is on Metoprolol, Atorvastatin, Synthroid, Miralax.   -- s/p Lasix.  -- Pt w/ hyponatremia, current lab on 8/28 revealed abnormal Na 128L. Will continue to monitor electrolytes.

## 2023-08-28 NOTE — CONSULT NOTE ADULT - SUBJECTIVE AND OBJECTIVE BOX
Structural Heart Team    HPI:  88 year old female with past medical  A.fib and Mechanical AVR ([facility]) on coumadin, CAD, HTN/HLD, hypothyroidism, history of cholelithiasis/acute cholecystitis (managed conservatively 2022), who initially presents from home with few week history of worsening shortness of breath. At [place] found to have acute on chronic diastolic heart failure due to severe MR/TR, NSTEMI type 2. Managed with IV lasix - BP did not tolerate bid, tx here on 40mg IV daily. Additionally, patient with b/l pleural effusions - mild loculated R effusion + 1.4cm paratracheal LN - seen by pulm - however family declined thoracentesis due to transfer to [place]. Per pulm low suspicion for malignancy and most likely related to HF, recommended continued follow up with pulm here. Patient also with persistent aflutter with RVR, was initiated on digoxin, had supratherapeutic levels, held, now restarted. Was also bridged to coumadin, now on hold on heparin gtt in preparation for potential intervention by [facility]..   TTE  showed EF 60-65%, severe MR, severe TR, moderate pulm HTN.  Here - patient saturating 99% on 2L, comfortable.    At baseline Patient lives at home alone and is independent.     Today she is resting in bed, stating she feels tired. She reports that since January she has had Pedal edema, and over the past few weeks she has been fatigued and having no energy. She denies any CP but will get palpitations on occasion. She lives by herself, and is dependent in her ADL's. She has had a loss of appetite of late, and has not been very active.             @ 07:  -   @ 07:00  --------------------------------------------------------  IN: 760 mL / OUT: 0 mL / NET: 760 mL        PAST MEDICAL & SURGICAL HISTORY:  S/P AVR      Atrial fibrillation      Hypothyroidism      History of appendectomy      S/P AVR          FAMILY HISTORY:            penicillin (Rash)  sulfa drugs (Rash)    acetaminophen     Tablet .. 650 milliGRAM(s) Oral every 6 hours  aspirin enteric coated 81 milliGRAM(s) Oral daily  atorvastatin 20 milliGRAM(s) Oral at bedtime  heparin  Infusion. 600 Unit(s)/Hr IV Continuous <Continuous>  levothyroxine 88 MICROGram(s) Oral daily  metoprolol tartrate 25 milliGRAM(s) Oral two times a day  polyethylene glycol 3350 17 Gram(s) Oral daily      T(C): 36.4 (23 @ 11:36), Max: 36.7 (23 @ 19:27)  HR: 75 (23 @ 11:36) (75 - 91)  BP: 117/75 (23 @ 11:36) (107/67 - 128/77)  RR: 18 (23 @ 11:36) (16 - 18)  SpO2: 94% (23 @ 11:36) (94% - 97%)  Wt(kg): --      Review of Symptoms:  General: Alert, Follows commands  Respiratory:  + SOB  Cardiac: Denies CP, + Palpitations, Denies Palpitations  Gastrointestinal: Denies Pain, Denies N/V  Extremities: + Pedal Edema, No Joint pain  Vascular: Negative  Neurological: Negative  Endocrine: negative      Physical Exam:  Gen: A/Ox3, NAD  HEENT: No JVD, Neck Supple, Trachea midline, No masses  Pulmonary: CTAB,  No accessory muscle use for respiration  Cardiac: S1S2, RRR, II/VI LOLI    No Gallops/Rubs  ECG: AFib  Gastrointestinal: Soft, NT/ND, + Bowel Sounds  Extremities: Trace Edema,  No joint pain or swelling +PMSx4  Vascular: 1+ pulses B/L, No Bruits  Neurological: Non Focal, = motor and sensory      Laboratory:                        11.4   9.17  )-----------( 254      ( 28 Aug 2023 06:12 )             33.9    08-    128<L>  |  91<L>  |  20  ----------------------------<  106<H>  4.2   |  27  |  1.03    Ca    9.7      28 Aug 2023 06:13  Mg     2.1         TPro  7.6  /  Alb  3.8  /  TBili  1.4<H>  /  DBili  x   /  AST  39  /  ALT  26  /  AlkPhos  82  08-27   PT/INR - ( 28 Aug 2023 13:00 )   PT: 25.7 sec;   INR: 2.51 ratio         PTT - ( 28 Aug 2023 13:00 )  PTT:52.7 sec    Pro-BNP:        Transthoracic Echo:  < from: TTE Echo Complete w/o Contrast w/ Doppler (23 @ 10:34) >  LV EF (2D):              57 %    (>55%)  Relative Wall Thickness  0.48    (<0.42)    LV DIASTOLIC FUNCTION:  MV Peak E: 1.37 m/s Decel Time: 184 msec    SPECTRAL DOPPLER ANALYSIS (where applicable):  Mitral Valve:  MV P1/2 Time: 53.48 msec  MV Area, PHT: 4.11 cm²    Aortic Valve: AoV Max Lei: 1.83 m/s AoV Peak P.4 mmHg AoV Mean P.6 mmHg    LVOT Vmax: 0.38 m/s LVOT VTI: 0.091 m LVOT Diameter: 2.00 cm    AoV Area, Vmax: 0.66 cm² AoV Area, VTI: 0.93 cm² AoV Area, Vmn: 0.65 cm²  Ao VTI: 0.309  Tricuspid Valve and PA/RV Systolic Pressure: TR Max Velocity: 2.98 m/s RA   Pressure: 15 mmHg RVSP/PASP: 50.5 mmHg      PHYSICIAN INTERPRETATION:  Left Ventricle: The left ventricular internal cavity size is normal.  Global LV systolic function was normal. Left ventricular ejection   fraction, by visual estimation, is 60 to 65%. Abnormal (paradoxical)   septal motion consistent with post-operative status. The mitral in-flow   pattern reveals no discernable A-wave, therefore no comment on diastolic   function can be made.  Left ventricle chordal calcification.  Right Ventricle: Normal right ventricular size and function.  Left Atrium: Left atrial enlargement.  Right Atrium: Right atrial enlargement.  Pericardium:There is no evidence of pericardial effusion. There is a   moderate pleural effusion in both left and right lateral regions.  Mitral Valve: The mitral valve is degenerative in appearance. Moderate   thickening and calcification of the anterior and posterior mitral valve   leaflets. There is mild mitral annular calcification. Severe mitral valve   regurgitation is seen. Borderline elevated mitral valve mean gradient    5 mmHg at  BPM.  Tricuspid Valve: Severe tricuspid regurgitation is visualized. Estimated   pulmonary artery systolic pressure is 50.5 mmHg assuming a right atrial   pressure of 15 mmHg, which is consistent with moderate pulmonary   hypertension.  Aortic Valve: Trivial aortic valve regurgitation is seen. There is a   mechanical valve in the aortic position, appears well seated with peak   velocity within normal limits.  Pulmonic Valve: The pulmonic valve is normal.  Aorta: Aortic root measured at Sinus of Valsalva is normal.  Venous: The inferior vena cava was dilated,with respiratory size   variation less than 50%, consistent with elevated right atrial pressure.      Summary:   1. Normal global left ventricular systolic function.   2. Left ventricular ejection fraction, by visual estimation, is 60 to   65%.   3. Abnormal septal motion consistent with post-operative status.   4. There is mild septal left ventricular hypertrophy.   5. The mitral in-flow pattern reveals no discernable A-wave, therefore   no comment on diastolic function can be made.   6. Left ventricle chordal calcification.   7. Normal right ventricular size and function.   8. Left atrial enlargement.   9. Right atrial enlargement.  10. Degenerative mitral valve.  11. Mild mitral annular calcification.  12. Moderate thickening and calcification of the anterior and posterior   mitral valve leaflets.  13. Severe mitral valve regurgitation.  14. Borderline elevated mitral valve mean gradient    5 mmHg at  BPM.  15. Severe tricuspid regurgitation.  16. There is a mechanical valve in the aortic position, appears well   seated with peak velocity within normal limits.  17. Moderate pleural effusion in both left and right lateral regions.  18. Estimated pulmonary artery systolic pressure is 50.5 mmHg assuming a   right atrial pressureof 15 mmHg, which is consistent with moderate   pulmonary hypertension.  19. There is no evidence of pericardial effusion.    [id number] Gunjan Pruitt MD Electronically signed on   2023 at 1:15:43 PM      < end of copied text >

## 2023-08-28 NOTE — DIETITIAN INITIAL EVALUATION ADULT - ORAL INTAKE PTA/DIET HISTORY
Pt endorses decreased PO intake for ~ 1 month due to SOB and weakness causing decreased PO intake. Pt states she does not add salt to foods otherwise.  Confirms NKFA/intolerance  Micronutrient/Other supplementation: none  Protein-energy supplementation: none

## 2023-08-28 NOTE — CONSULT NOTE ADULT - ASSESSMENT
Ms Gillespie has gone into CHF during the past 6 weeks apparently due to mitral regurgitation.  She had only mild MR on prior echo's and her symptoms and worsening PA systolic pressure along with lack of change in her LV dimensions suggest this is a relatively acute change.  She has also never been in persistent atrial fibrillation in the past and is unclear how much this is contributing to her deterioration.     Needs evaluation including FAREED by structural heart.      Think need to try to get her back in NSR to see what effect it has on situation.  Would load with amiodarone and cardiovert.    Her Na is getting pretty low.  She is comfortable at present and would hold diuretics for day or two till improved.     LANE Alonzo  [phone]

## 2023-08-28 NOTE — DIETITIAN INITIAL EVALUATION ADULT - ENERGY INTAKE
Pt states her appetite ranges from poor to fair depends on how she feel while in house. Flowsheets documents PO ranges from poor to good. Pt is aware of menu ordering procedure in house w/ menu provided, encourage pt to order preferred foods to optimize PO intake while in house. States she will drink Ensure 1x daily, does not want additional supplement when offered. Food preferences updated and will add to pt's tray.

## 2023-08-28 NOTE — DIETITIAN INITIAL EVALUATION ADULT - NS FNS DIET ORDER
Diet, DASH/TLC:   Sodium & Cholesterol Restricted  Supplement Feeding Modality:  Oral  Ensure Plus High Protein Cans or Servings Per Day:  1       Frequency:  Daily (08-26-23 @ 07:48) [Active]

## 2023-08-28 NOTE — PHYSICAL THERAPY INITIAL EVALUATION ADULT - PERTINENT HX OF CURRENT PROBLEM, REHAB EVAL
88 year old female with past medical  A.fib and Mechanical AVR ([facility]) on coumadin, CAD, HTN/HLD, hypothyroidism, history of cholelithiasis/acute cholecystitis (managed conservatively 8/2022), who initially presents from home with few week history of worsening shortness of breath. At [place] found to have acute on chronic diastolic heart failure due to severe MR/TR, NSTEMI type 2. Managed with IV lasix - BP did not tolerate bid, tx here on 40mg IV daily. Additionally, patient with b/l pleural effusions - mild loculated R effusion + 1.4cm paratracheal LN - seen by pulm - however family declined thoracentesis due to transfer to [place]. Per pulm low suspicion for malignancy and most likely related to HF, recommended continued follow up with pulm here. Patient also with persistent aflutter with RVR, was initiated on digoxin, had supra therapeutic levels, held, now restarted. Was also bridged to coumadin, now on hold on heparin gtt in preparation for potential intervention by [facility].. Hospital course:

## 2023-08-28 NOTE — DIETITIAN INITIAL EVALUATION ADULT - OTHER CALCULATIONS
Fluid needs deferred to team. Energy and protein needs based on dosing wt of 56.7kg in consideration of malnutrition, increased nutrient needs for dx CHF.

## 2023-08-28 NOTE — PHYSICAL THERAPY INITIAL EVALUATION ADULT - ACTIVE RANGE OF MOTION EXAMINATION, REHAB EVAL
Unable to performed passive LT knee/hip flexion 2/2 pain aggravation/bilateral upper extremity Active ROM was WFL (within functional limits)/Right LE Active ROM was WFL (within functional limits)/deficits as listed below

## 2023-08-28 NOTE — DIETITIAN INITIAL EVALUATION ADULT - NSFNSGIIOFT_GEN_A_CORE
Pt confirms Ht 64"  Dosing wt: 56.7kg/ 125lb (8/26)   UBW: ~125lb, pt dx CHF s/p Lasix might cause wt fluctuation   Wt from current admission (standing/bedscale): 108lb (8/28 bedscale), 123.8lb (8/27 standing), 124.7lb (8/26 standing)   RD will continue to monitor wt trends as available/able.   Wt history from previous admission: 125lb (8/20/23)  [facility] no [facility]

## 2023-08-28 NOTE — PATIENT PROFILE ADULT - FALL HARM RISK - RISK INTERVENTIONS

## 2023-08-28 NOTE — CONSULT NOTE ADULT - SUBJECTIVE AND OBJECTIVE BOX
Patient seen and evaluated @   Chief Complaint:     HPI:  88 year old female with past medical  A.fib and Mechanical AVR ([facility]) on coumadin, CAD, HTN/HLD, hypothyroidism, history of cholelithiasis/acute cholecystitis (managed conservatively 2022), who initially presents from home with few week history of worsening shortness of breath. At [place] found to have acute on chronic diastolic heart failure due to severe MR/TR, NSTEMI type 2. Managed with IV lasix - BP did not tolerate bid, tx here on 40mg IV daily. Additionally, patient with b/l pleural effusions - mild loculated R effusion + 1.4cm paratracheal LN - seen by pulm - however family declined thoracentesis due to transfer to NS. Per pulm low suspicion for malignancy and most likely related to HF, recommended continued follow up with pulm here. Patient also with persistent aflutter with RVR, was initiated on digoxin, had supratherapeutic levels, held, now restarted. Was also bridged to coumadin, now on hold on heparin gtt in preparation for potential intervention by [facility]..   TTE  showed EF 60-65%, severe MR, severe TR, moderate pulm HTN.  Here - patient saturating 99% on 2L, comfortable.    At baseline Patient lives at home alone and is independent.   She was last seen us in April.  She last had an echo on  at which time she had MAC with mild stenosis and insufficiency, moderate TR and a PAS estimated at 36.  She has a history of paroxsymal atrial fibrillation in the past and reports she thinks she has been in it continuously for the past few weeks    She is comfortable at present,except for pain in her left groin which prevents her from walking.    (26 Aug 2023 08:21)    PMH:   S/P AVR    Atrial fibrillation    Hypothyroidism      PSH:   History of appendectomy    S/P AVR      Medications:   acetaminophen     Tablet .. 650 milliGRAM(s) Oral every 6 hours  aspirin enteric coated 81 milliGRAM(s) Oral daily  atorvastatin 20 milliGRAM(s) Oral at bedtime  cyclobenzaprine 5 milliGRAM(s) Oral three times a day PRN  heparin   Injectable 4500 Unit(s) IV Push every 6 hours PRN  heparin   Injectable 2000 Unit(s) IV Push every 6 hours PRN  heparin  Infusion. 600 Unit(s)/Hr IV Continuous <Continuous>  levothyroxine 88 MICROGram(s) Oral daily  melatonin 3 milliGRAM(s) Oral at bedtime PRN  metoprolol tartrate 25 milliGRAM(s) Oral two times a day  polyethylene glycol 3350 17 Gram(s) Oral daily    Allergies:  penicillin (Rash)  sulfa drugs (Rash)    FAMILY HISTORY:    Social History:  Smoking:  Alcohol:  Drugs:    Review of Systems:  Constitutional: [ ] Fever [ ] Chills [ ] Fatigue [ ] Weight change   HEENT: [ ] Blurred vision [ ] Eye Pain [ ] Headache [ ] Runny nose [ ] Sore Throat   Respiratory: [ ] Cough [ ] Wheezing [ ] Shortness of breath  Cardiovascular: [ ] Chest Pain [ ] Palpitations [ ] FERRARO [ ] PND x[ ] Orthopnea  Gastrointestinal: [ ] Abdominal xPain [ ] Diarrhea [ ] Constipation [ ] Hemorrhoids [ ] Nausea [ ] Vomiting  Genitourinary: [ ] Nocturia [ ] Dysuria [ ] Incontinence  Extremities: [ ] Swelling [x ] Joint Pain  Neurologic: [ ] Focal deficit [ ] Paresthesias [ ] Syncope  Lymphatic: [ ] Swelling [ ] Lymphadenopathy   Skin: [ ] Rash [ ] Ecchymoses [ ] Wounds [ ] Lesions  Psychiatry: [ ] Depression [ ] Suicidal/Homicidal Ideation [ ] Anxiety [ ] Sleep Disturbances  [x ] 10 point review of systems is otherwise negative except as mentioned above            [ ]Unable to obtain    Physical Exam:  T(C): 36.4 (23 @ 05:24), Max: 36.7 (23 @ 19:27)  HR: 81 (23 @ 05:24) (81 - 91)  BP: 128/77 (23 @ 05:24) (107/67 - 128/77)  RR: 16 (23 @ 05:24) (16 - 18)  SpO2: 97% (23 @ 05:24) (96% - 97%)  Wt(kg): --     @ 07:01  -   @ 07:00  --------------------------------------------------------  IN: 760 mL / OUT: 0 mL / NET: 760 mL      Daily     Daily Weight in k (28 Aug 2023 05:24)    Appearance: [ ] Normal [ ] NAD  Eyes: [ ] PERRL [ ] EOMI  HENT: [ ] Normal oral mus[place]a [ ]NC/AT  Cardiovascular: [irregular, systolic apical murmur  Procedural Access Site: [ ] No hematoma [ ] Non-tender to palpation [ ] 2+ pulse [ ] No bruit [ ] No Ecchymosis  Respiratory: [x ] Clear to auscultation bilaterally  Gastrointestinal: [x ] Soft [ ] Non-tender [ ] Non-distended [ ] BS+  Musculoskeletal: [ ] No clubbing [ ] No joint deformity   Neurologic: [ ] Non-focal  Lymphatic: [ ] No lymphadenopathy  Psychiatry: [ ] AAOx3 [ ] Mood & affect appropriate  Skin: [ ] No rashes [ ] No ecchymoses [ ] No cyanosis    Cardiovascular Diagnostic Testing:  ECG:    Echo:    Stress Testing:    Cath:    Interpretation of Telemetry:    Imaging:    Labs:                        11.4   9.17  )-----------( 254      ( 28 Aug 2023 06:12 )             33.9     08-28    128<L>  |  91<L>  |  20  ----------------------------<  106<H>  4.2   |  27  |  1.03    Ca    9.7      28 Aug 2023 06:13  Mg     2.1     08-    TPro  7.6  /  Alb  3.8  /  TBili  1.4<H>  /  DBili  x   /  AST  39  /  ALT  26  /  AlkPhos  82  08-27    PTT - ( 28 Aug 2023 06:14 )  PTT:121.2 sec

## 2023-08-28 NOTE — CONSULT NOTE ADULT - NS ATTEND AMEND GEN_ALL_CORE FT
The Structural Heart team was asked to evaluate Mrs Gillespie in the setting of MR, TR, and acute diastolic heart failure. She was living independently prior to admission (she drove herself to [facility]) but notes at least 6 months of progressive fatigue, FERRARO, and overall functional decline. She notes her  passed away in January after which she began to pay more attention to her own symptoms. She had a TTE at  which I reviewed with Dr Aure Hoyt in detail; impressions as follows: normal LV size and function, normal RV size with mild to moderate reduced RV function, calcific mitral valve disease with bulky MAC and leaflet calcification with severe MR, severe functional TR, bilateral pleural effusions, and a dilated IVC suggesting she is volume up. I have discussed these findings with her and her daughter at the bedside in great detail. As I explained, the anatomy as described is not optimal for M-BITA, but she will need a FAREED to definitively determine if M-BITA is feasible. I would prefer to do the FAREED when she is euvolemic to dry, as this will be best to assess if her anatomy is suitable for catheter-based intervention. If OK with Dr Alonzo and the Medicine team, I would suggest restarting 20mg of IV Lasix tomorrow (if her sodium and BP can handle it). I would then suggest the FAREED for Wednesday, after which we will reconvene and discuss options. If her anatomy can be treated with M-BITA, I would plan on a left and right cardiac catheterization later this week. If it is not amenable to M-BITA, her only option (other than surgery) would be to consider TMVR as part of a clinical trial--but, as I explained to her, severe TR is an exclusion for the trial, so her TR severity would also need to be assessed by the FAREED. I have answered all of her and her daughters questions in detail. She is also quite deconditioned after more than a week in the hospital, for which I would suggest daily PT to maintain her functional status.  Jim Garcia MD

## 2023-08-29 NOTE — CONSULT NOTE ADULT - ASSESSMENT
88 year old female with with PMH of A.fib and Mechanical AVR ([facility]) on coumadin, CAD, HTN/HLD, hypothyroidism, history of cholelithiasis/acute cholecystitis (managed conservatively 8/2022) with worsening hyponatremia.

## 2023-08-29 NOTE — CHART NOTE - NSCHARTNOTEFT_GEN_A_CORE
Spoke to nephrology team. Advised to give sodium tablets 2g BID for hyponatremia.  Notified by nurse that EP called stating they may not be able to do the FAREED tomorrow if sodium is too low.  Advised by nephrology team to follow up BMP in AM and consider giving lasix tomorrow in the morning.  Attending notified.      Pilar Barnes PA-C

## 2023-08-29 NOTE — CONSULT NOTE ADULT - SUBJECTIVE AND OBJECTIVE BOX
[facility] DIVISION OF KIDNEY DISEASES AND HYPERTENSION -- [phone]  -- INITIAL CONSULT NOTE  --------------------------------------------------------------------------------  HPI:  88 year old female with PMH of A.fib and Mechanical AVR ([facility]) on coumadin, CAD, HTN/HLD, hypothyroidism, history of cholelithiasis/acute cholecystitis (managed conservatively 8/2022), who initially presents from home with few week history of worsening shortness of breath.   At [place] found to have acute on chronic diastolic heart failure due to severe MR/TR, NSTEMI type 2. Additionally, patient with b/l pleural effusions - mild loculated R effusion + 1.4cm paratracheal LN - seen by pulm suspicion for related to HF. Patient also with persistent aflutter with RVR, was initiated on digoxin, had supratherapeutic levels, held, now restarted.   TTE 8/20 showed EF 60-65%, severe MR, severe TR, moderate pulm HTN.    Patient admitted 3 days ago, noticed to have worsening hyponatremia (on admission 134; now 126). Nephrology consulted for recs.     PAST HISTORY  --------------------------------------------------------------------------------  PAST MEDICAL & SURGICAL HISTORY:  S/P AVR      Atrial fibrillation      Hypothyroidism      History of appendectomy      S/P AVR        FAMILY HISTORY:    PAST SOCIAL HISTORY:    ALLERGIES & MEDICATIONS  --------------------------------------------------------------------------------  Allergies    penicillin (Rash)  sulfa drugs (Rash)    Intolerances      Standing Inpatient Medications  acetaminophen     Tablet .. 650 milliGRAM(s) Oral every 6 hours  aspirin enteric coated 81 milliGRAM(s) Oral daily  atorvastatin 20 milliGRAM(s) Oral at bedtime  heparin  Infusion. 600 Unit(s)/Hr IV Continuous <Continuous>  levothyroxine 88 MICROGram(s) Oral daily  lidocaine   4% Patch 1 Patch Transdermal daily  metoprolol tartrate 25 milliGRAM(s) Oral two times a day  multivitamin 1 Tablet(s) Oral daily  polyethylene glycol 3350 17 Gram(s) Oral daily    PRN Inpatient Medications  cyclobenzaprine 5 milliGRAM(s) Oral three times a day PRN  heparin   Injectable 4500 Unit(s) IV Push every 6 hours PRN  heparin   Injectable 2000 Unit(s) IV Push every 6 hours PRN  melatonin 3 milliGRAM(s) Oral at bedtime PRN      REVIEW OF SYSTEMS  --------------------------------------------------------------------------------  Gen: No fevers/chills  Head/Eyes/Ears: No HA  Respiratory: No dyspnea, cough  CV: No chest pain  GI: No abdominal pain, diarrhea  : No dysuria, hematuria  MSK: No  edema  Skin: No rashes  Heme: No easy bruising or bleeding    All other systems were reviewed and are negative, except as noted.    VITALS/PHYSICAL EXAM  --------------------------------------------------------------------------------  T(C): 36.8 (08-29-23 @ 05:00), Max: 36.8 (08-29-23 @ 05:00)  HR: 78 (08-29-23 @ 05:00) (75 - 81)  BP: 113/70 (08-29-23 @ 05:00) (96/59 - 117/75)  RR: 16 (08-29-23 @ 05:00) (16 - 18)  SpO2: 98% (08-29-23 @ 05:00) (94% - 98%)  Wt(kg): --        08-28-23 @ 07:01  -  08-29-23 @ 07:00  --------------------------------------------------------  IN: 512 mL / OUT: 1075 mL / NET: -563 mL        Physical Exam:  	Gen: NAD  	HEENT: Anicteric  	Pulm: CTA B/L  	CV: S1S2+  	Abd: Soft, +BS            Transplant site: RLQ non tender, well healed surgical scar.  	Ext: No LE edema B/L  	Neuro: Awake          : Rios+ with clear urine in the bag  	Skin: Warm and dry  	Dialysis access:     LABS/STUDIES  --------------------------------------------------------------------------------              10.7   11.84 >-----------<  260      [08-29-23 @ 03:09]              31.0     126  |  90  |  16  ----------------------------<  109      [08-29-23 @ 03:10]  4.0   |  25  |  0.86        Ca     9.4     [08-29-23 @ 03:10]      PT/INR: PT 25.7 , INR 2.51       [08-28-23 @ 13:00]  PTT: 75.3       [08-29-23 @ 02:54]    CK 1269      [08-29-23 @ 03:10]  Serum Osmolality 278      [08-28-23 @ 20:33]    Creatinine Trend:  SCr 0.86 [08-29 @ 03:10]  SCr 0.84 [08-28 @ 20:33]  SCr 1.03 [08-28 @ 06:13]  SCr 1.02 [08-27 @ 06:57]  SCr 1.08 [08-26 @ 14:11]    Urinalysis - [08-29-23 @ 03:10]      Color  / Appearance  / SG  / pH       Gluc 109 / Ketone   / Bili  / Urobili        Blood  / Protein  / Leuk Est  / Nitrite       RBC  / WBC  / Hyaline  / Gran  / Sq Epi  / Non Sq Epi  / Bacteria     Urine Osmolality 554      [08-28-23 @ 13:58]        Tacrolimus  Cyclosporine  Sirolimus  Mycophenolate  BK PCR  CMV PCR  Parvo PCR  EBV PCR [facility] DIVISION OF KIDNEY DISEASES AND HYPERTENSION -- [phone]  -- INITIAL CONSULT NOTE  --------------------------------------------------------------------------------  HPI:  88 year old female with PMH of A.fib and Mechanical AVR ([facility]) on coumadin, CAD, HTN/HLD, hypothyroidism, history of cholelithiasis/acute cholecystitis (managed conservatively 8/2022), who initially presents from home with few week history of worsening shortness of breath.   At [place] found to have acute on chronic diastolic heart failure due to severe MR/TR, NSTEMI type 2. Additionally, patient with b/l pleural effusions - mild loculated R effusion + 1.4cm paratracheal LN - seen by pulm suspicion for related to HF. Patient also with persistent aflutter with RVR, was initiated on digoxin, had supratherapeutic levels, held, now restarted.   TTE 8/20 showed EF 60-65%, severe MR, severe TR, moderate pulm HTN.    Patient admitted 3 days ago, noticed to have worsening hyponatremia (on admission 134; now 126). Endorses generalized weakness for last few weeks. Denies nausea, vomiting, fever, chills, diarrhea, increased PO water intake. Nephrology consulted for recs.     PAST HISTORY  --------------------------------------------------------------------------------  PAST MEDICAL & SURGICAL HISTORY:  S/P AVR      Atrial fibrillation      Hypothyroidism      History of appendectomy      S/P AVR        FAMILY HISTORY:    PAST SOCIAL HISTORY:    ALLERGIES & MEDICATIONS  --------------------------------------------------------------------------------  Allergies    penicillin (Rash)  sulfa drugs (Rash)    Intolerances      Standing Inpatient Medications  acetaminophen     Tablet .. 650 milliGRAM(s) Oral every 6 hours  aspirin enteric coated 81 milliGRAM(s) Oral daily  atorvastatin 20 milliGRAM(s) Oral at bedtime  heparin  Infusion. 600 Unit(s)/Hr IV Continuous <Continuous>  levothyroxine 88 MICROGram(s) Oral daily  lidocaine   4% Patch 1 Patch Transdermal daily  metoprolol tartrate 25 milliGRAM(s) Oral two times a day  multivitamin 1 Tablet(s) Oral daily  polyethylene glycol 3350 17 Gram(s) Oral daily    PRN Inpatient Medications  cyclobenzaprine 5 milliGRAM(s) Oral three times a day PRN  heparin   Injectable 4500 Unit(s) IV Push every 6 hours PRN  heparin   Injectable 2000 Unit(s) IV Push every 6 hours PRN  melatonin 3 milliGRAM(s) Oral at bedtime PRN      REVIEW OF SYSTEMS  --------------------------------------------------------------------------------  Gen: No fevers/chills  Head/Eyes/Ears: No HA  Respiratory: No dyspnea, cough  CV: No chest pain  GI: No abdominal pain, diarrhea  : No dysuria, hematuria  MSK: No  edema  Skin: No rashes  Heme: No easy bruising or bleeding    All other systems were reviewed and are negative, except as noted.    VITALS/PHYSICAL EXAM  --------------------------------------------------------------------------------  T(C): 36.8 (08-29-23 @ 05:00), Max: 36.8 (08-29-23 @ 05:00)  HR: 78 (08-29-23 @ 05:00) (75 - 81)  BP: 113/70 (08-29-23 @ 05:00) (96/59 - 117/75)  RR: 16 (08-29-23 @ 05:00) (16 - 18)  SpO2: 98% (08-29-23 @ 05:00) (94% - 98%)  Wt(kg): --        08-28-23 @ 07:01  -  08-29-23 @ 07:00  --------------------------------------------------------  IN: 512 mL / OUT: 1075 mL / NET: -563 mL        Physical Exam:  	Gen: NAD  	HEENT: Anicteric  	Pulm: CTA B/L  	CV: S1S2+  	Abd: Soft, +BS    	Ext: No LE edema B/L  	Neuro: Awake  	Skin: Warm and dry  	Dialysis access: none    LABS/STUDIES  --------------------------------------------------------------------------------              10.7   11.84 >-----------<  260      [08-29-23 @ 03:09]              31.0     126  |  90  |  16  ----------------------------<  109      [08-29-23 @ 03:10]  4.0   |  25  |  0.86        Ca     9.4     [08-29-23 @ 03:10]      PT/INR: PT 25.7 , INR 2.51       [08-28-23 @ 13:00]  PTT: 75.3       [08-29-23 @ 02:54]    CK 1269      [08-29-23 @ 03:10]  Serum Osmolality 278      [08-28-23 @ 20:33]    Creatinine Trend:  SCr 0.86 [08-29 @ 03:10]  SCr 0.84 [08-28 @ 20:33]  SCr 1.03 [08-28 @ 06:13]  SCr 1.02 [08-27 @ 06:57]  SCr 1.08 [08-26 @ 14:11]    Urinalysis - [08-29-23 @ 03:10]      Color  / Appearance  / SG  / pH       Gluc 109 / Ketone   / Bili  / Urobili        Blood  / Protein  / Leuk Est  / Nitrite       RBC  / WBC  / Hyaline  / Gran  / Sq Epi  / Non Sq Epi  / Bacteria     Urine Osmolality 554      [08-28-23 @ 13:58]        Tacrolimus  Cyclosporine  Sirolimus  Mycophenolate  BK PCR  CMV PCR  Parvo PCR  EBV PCR

## 2023-08-30 NOTE — PROGRESS NOTE ADULT - TIME BILLING
reviewing documentation/labs/imaging, interviewing and examining patient, documentation, coordinating care with ACP/CM/Specialists

## 2023-08-30 NOTE — PROGRESS NOTE ADULT - PROBLEM SELECTOR PLAN 9
Hypotonic Hyponatremia ADH mediated likely 2/2 pain (U Na 50, Uosm 554).   -Renal following  -Salt tabs 2gm TID   -F/u repeat PM BMP

## 2023-08-30 NOTE — CHART NOTE - NSCHARTNOTEFT_GEN_A_CORE
-Informed by Dr. Lira of finding of CT Abdomen/Pelvis Non- contrast performed this evening     < from: CT Abdomen and Pelvis No Cont (08.30.23 @ 18:03) >    PROCEDURE DATE:  08/30/2023    ******PRELIMINARY REPORT******      ******PRELIMINARY REPORT******     INTERPRETATION:  retroperitoneal hematoma measuring approximately 20.7 x   8.7 x. 5.2 cm extending from the left posterior pararenal space into the   left iliacus and anterior proximal left thigh.  no other acute findings.  interval resolution of bilateral pleural effusions since 8/19/23.    Follow up final report.    Vital Signs Last 24 Hrs  T(C): 36.7 (30 Aug 2023 20:03), Max: 36.9 (30 Aug 2023 18:00)  T(F): 98 (30 Aug 2023 20:03), Max: 98.5 (30 Aug 2023 18:00)  HR: 96 (30 Aug 2023 20:03) (63 - 98)  BP: 106/69 (30 Aug 2023 20:03) (98/65 - 117/66)  BP(mean): --  RR: 19 (30 Aug 2023 20:03) (16 - 20)  SpO2: 98% (30 Aug 2023 20:03) (96% - 99%)    Parameters below as of 30 Aug 2023 20:03  Patient On (Oxygen Delivery Method): room air    -Pt seen at bedside, is resting in NAD, remains hemodynamically stable.   On exam, pt has Lt. thigh pain, and unable to elevate leg due to pain (per pt. not new)   She has minimal Lt. flank pain on palpation                          9.0    13.92 )-----------( 262      ( 30 Aug 2023 20:19 ) ; Hgb/Hct downtrending from 9.9/28.8 earlier this AM             26.3       08-30    132<L>  |  95<L>  |  17  ----------------------------<  114<H>  4.5   |  24  |  0.90    Ca    9.2      30 Aug 2023 21:49  Mg     1.9     08-30      -Plan per discussion with Dr. Lira:    >VS q 4 hrs.  >CBC q 4 hrs.   >Continue Heparin gtt, pt. specific, goal 50-70 in light of mechanical AV   >Surgical consult - await recs.  >CT Abdomen/Pelvis with IV contrast to evaluate extent of RP hematoma and extravasation of contrast      -Pending results, will consult IR for recs.   >Strict bedrest.  >Pt informed of result of initial CT results; pt's daughter Rachel also informed of result, and plan as outlined  >Primary RN informed of plan

## 2023-08-30 NOTE — CONSULT NOTE ADULT - SUBJECTIVE AND OBJECTIVE BOX
HPI:  88 year old female with past medical  A.fib and Mechanical AVR ([facility]) on coumadin, CAD, HTN/HLD, hypothyroidism, history of cholelithiasis/acute cholecystitis (managed conservatively 8/2022), who initially presents from home with few week history of worsening shortness of breath. At [place] found to have acute on chronic diastolic heart failure due to severe MR/TR, NSTEMI type 2. Managed with IV lasix - BP did not tolerate bid, tx here on 40mg IV daily. Additionally, patient with b/l pleural effusions - mild loculated R effusion + 1.4cm paratracheal LN - seen by pulm - however family declined thoracentesis due to transfer to [place]. Per pulm low suspicion for malignancy and most likely related to HF, recommended continued follow up with pulm here. Patient also with persistent aflutter with RVR, was initiated on digoxin, had supratherapeutic levels, held, now restarted. Was also bridged to coumadin, now on hold on heparin gtt in preparation for potential intervention by str heart..   TTE 8/20 showed EF 60-65%, severe MR, severe TR, moderate pulm HTN.  Here - patient saturating 99% on 2L, comfortable.    Patient was admitted on 8/26, seen today, daughter at bedside. Patient complains of left hip, groin pain, worse with standing, comfortable at rest. Pain started at [facility], denies falls.     REVIEW OF SYSTEMS  Constitutional - No fever, No weight loss, No fatigue  HEENT - No eye pain, No visual disturbances, No difficulty hearing, No tinnitus, No vertigo, No neck pain  Respiratory - No cough, No wheezing, No shortness of breath  Cardiovascular - No chest pain, No palpitations  Gastrointestinal - No abdominal pain, No nausea, No vomiting, No diarrhea, No constipation  Genitourinary - No dysuria, No frequency, No hematuria, No incontinence  Psychiatric - No depression, No anxiety    VITALS  T(C): 36.7 (08-30-23 @ 13:56), Max: 36.9 (08-29-23 @ 20:33)  HR: 83 (08-30-23 @ 13:56) (63 - 87)  BP: 102/56 (08-30-23 @ 13:56) (98/61 - 117/66)  RR: 16 (08-30-23 @ 13:56) (16 - 16)  SpO2: 98% (08-30-23 @ 13:56) (97% - 99%)  Wt(kg): --    PAST MEDICAL & SURGICAL HISTORY  S/P AVR    Atrial fibrillation    Hypothyroidism    History of appendectomy    S/P AVR        SOCIAL HISTORY  Smoking - Denied  EtOH - Denied   Drugs - Denied    FUNCTIONAL HISTORY  Lives alone, 3 steps to enter  Independent AMB and ADLs PTA     CURRENT FUNCTIONAL STATUS  8/29 PT  bed mobility min assist  transfers min assist with RW  gait min assist bed to chair with RW  limited by pain     FAMILY HISTORY   no pertinent history in first degree relatives     RECENT LABS/IMAGING  CBC Full  -  ( 30 Aug 2023 06:00 )  WBC Count : 13.01 K/uL  RBC Count : 3.25 M/uL  Hemoglobin : 9.9 g/dL  Hematocrit : 28.8 %  Platelet Count - Automated : 256 K/uL  Mean Cell Volume : 88.6 fl  Mean Cell Hemoglobin : 30.5 pg  Mean Cell Hemoglobin Concentration : 34.4 gm/dL  Auto Neutrophil # : x  Auto Lymphocyte # : x  Auto Monocyte # : x  Auto Eosinophil # : x  Auto Basophil # : x  Auto Neutrophil % : x  Auto Lymphocyte % : x  Auto Monocyte % : x  Auto Eosinophil % : x  Auto Basophil % : x    08-30    126<L>  |  90<L>  |  15  ----------------------------<  96  4.2   |  24  |  0.81    Ca    9.4      30 Aug 2023 06:05    TPro  7.1  /  Alb  3.3  /  TBili  1.1  /  DBili  0.2  /  AST  79<H>  /  ALT  29  /  AlkPhos  70  08-30    Urinalysis Basic - ( 30 Aug 2023 06:05 )    Color: x / Appearance: x / SG: x / pH: x  Gluc: 96 mg/dL / Ketone: x  / Bili: x / Urobili: x   Blood: x / Protein: x / Nitrite: x   Leuk Esterase: x / RBC: x / WBC x   Sq Epi: x / Non Sq Epi: x / Bacteria: x    < from: Xray Pelvis AP only (08.29.23 @ 09:40) >    IMPRESSION:  No hip fractures or dislocations.    Intact pelvic andobturator rings and symmetrically aligned and spaced SI   joints and pubic symphysis.    Lower lumbar spine degenerative change again apparent. Preserved   bilateral hip joint spaces. No gross radiographic evidence for AVN. Mild   enthesopathic change along bilateral peripheral iliac crest and greater   trochanter margins.    Generalized osteopenia otherwise no discrete lytic or blastic lesions.    External urine catch device overlies vulvar region.      < end of copied text >          ALLERGIES  penicillin (Rash)  sulfa drugs (Rash)      MEDICATIONS   acetaminophen     Tablet .. 650 milliGRAM(s) Oral every 6 hours  aspirin enteric coated 81 milliGRAM(s) Oral daily  cyclobenzaprine 5 milliGRAM(s) Oral three times a day PRN  heparin   Injectable 4500 Unit(s) IV Push every 6 hours PRN  heparin   Injectable 2000 Unit(s) IV Push every 6 hours PRN  heparin  Infusion. 600 Unit(s)/Hr IV Continuous <Continuous>  levothyroxine 88 MICROGram(s) Oral daily  lidocaine   4% Patch 1 Patch Transdermal daily  melatonin 3 milliGRAM(s) Oral at bedtime PRN  metoprolol tartrate 25 milliGRAM(s) Oral two times a day  multivitamin 1 Tablet(s) Oral daily  polyethylene glycol 3350 17 Gram(s) Oral daily  senna 2 Tablet(s) Oral at bedtime  sodium chloride 2 Gram(s) Oral three times a day  traMADol 25 milliGRAM(s) Oral every 8 hours PRN      ----------------------------------------------------------------------------------------  PHYSICAL EXAM  Constitutional - NAD, Comfortable in bed   Chest - Breathing comfortably, room air   Cardiovascular - S1S2   Abdomen - Soft   Extremities - No C/C/E, No calf tenderness   Neurologic Exam -                    Cognitive - Awake, Alert, AAO to self, place, date, year, situation     Communication - Fluent, No dysarthria        Motor -                     LEFT    UE - ShAB 5/5, EF 5/5, EE 5/5, WE 5/5,  5/5                    RIGHT UE - ShAB 5/5, EF 5/5, EE 5/5, WE 5/5,  5/5                    LEFT    LE - DF 5/5, PF 5/5 limited by pain unable to lift off bed                     RIGHT LE - HF 5/5, KE 5/5, DF 5/5, PF 5/5      Sensory - Intact to LT    Psychiatric - Mood stable, Affect WNL  ----------------------------------------------------------------------------------------  ASSESSMENT/PLAN  88yFemale h/o afib, avr on coumadin with functional deficits due to hip/groin pain  xray negative, unable to stand on left LE, would order MRI hip  CHF, cardiology following, awaiting FAREED   Pain - Tylenol tramadol, flexeril  DVT PPX - SCDs heparin   Rehab -   continue bedside therapy, requires min assist due to hip pain    awaiting imaging results, CT ABD/pelvis ordered to r/o bleed     Will continue to follow for ongoing rehab needs and recommendations.

## 2023-08-31 NOTE — PROGRESS NOTE ADULT - NS ATTEND AMEND GEN_ALL_CORE FT
Patient seen and examined by me on AM rounds.   Reports mild abdominal pain. No other complaints.   Remains hemodynamically stable.   Hb 10.4 from 7/6 after 1 U RBC.     Case discussed with IR. Plan for embolization today as she is unable be off AC due to mechanical valve.   NPO/IVF.

## 2023-08-31 NOTE — PROGRESS NOTE ADULT - PROBLEM SELECTOR PLAN 9
Hypotonic Hyponatremia ADH mediated likely 2/2 pain (U Na 50, Uosm 554).   -Renal following  -Salt tabs 2gm TID   -Monitor BMP

## 2023-08-31 NOTE — RAPID RESPONSE TEAM SUMMARY - NSSITUATIONBACKGROUNDRRT_GEN_ALL_CORE
88 year old female with past medical A.fib and Mechanical AVR ([facility]) on coumadin, CAD, HTN/HLD, hypothyroidism, history of cholelithiasis/acute cholecystitis (managed conservatively 8/2022), who initially presented to  from home with few week history of worsening shortness of breath. At [place] found to have acute hypoxic respiratory failure due to acute on chronic diastolic heart failure due to severe MR/TR, NSTEMI type 2, persistent aflutter with RVR, and b/l pleural effusions - mild loculated R effusion + 1.4cm paratracheal LN. Transferred to NS for structural heart eval and possible cardioversion. Patient was placed on hep gtt, found to have RP bleed s/p pelvic angiogram and IR coil embolization of left lumbar branch on 8/31. RRT called for hypotension, hypoxia and worsening lethargy. Per IR nurse at bedside, patient was hypotensive to SBP mid 70s post procedure, thought to be 2/2 sedation. Given 500 cc bolus and tiffany stick x2 with improvement to SBP 90s, patient desatted to 85%, and was more lethargic at which the RRT was called.  88 year old female with past medical A.fib and Mechanical AVR ([facility]) on coumadin, CAD, HTN/HLD, hypothyroidism, history of cholelithiasis/acute cholecystitis (managed conservatively 8/2022), who initially presented to  from home with few week history of worsening shortness of breath. At [place] found to have acute hypoxic respiratory failure due to acute on chronic diastolic heart failure due to severe MR/TR, NSTEMI type 2, persistent aflutter with RVR, and b/l pleural effusions - mild loculated R effusion + 1.4cm paratracheal LN. Transferred to NS for structural heart eval and possible cardioversion. Patient was placed on hep gtt, found to have RP bleed s/p pelvic angiogram and IR coil embolization of left lumbar branch on 8/31. RRT called for hypotension, hypoxia and worsening lethargy. Per IR nurse at bedside, patient was hypotensive to SBP mid 70s post procedure, thought to be 2/2 sedation. Given 500 cc bolus and tiffany stick x2 with improvement to SBP 90s, patient desatted to 85%, and was more lethargic at which point the RRT was called.

## 2023-08-31 NOTE — CONSULT NOTE ADULT - SUBJECTIVE AND OBJECTIVE BOX
88 year old female with past medical  A.fib and Mechanical AVR ([facility]) on coumadin, CAD, HTN/HLD, hypothyroidism, history of cholelithiasis/acute cholecystitis (managed conservatively 8/2022), who initially presents from home with few week history of worsening shortness of breath. At [place] found to have acute on chronic diastolic heart failure due to severe MR/TR, NSTEMI type 2. Managed with IV lasix - BP did not tolerate bid, tx here on 40mg IV daily. Additionally, patient with b/l pleural effusions - mild loculated R effusion + 1.4cm paratracheal LN - seen by pulm - however family declined thoracentesis due to transfer to NS. Per pulm low suspicion for malignancy and most likely related to HF, recommended continued follow up with pulm here. Patient also with persistent aflutter with RVR, was initiated on digoxin, had supratherapeutic levels, held, now restarted. Was also bridged to coumadin, now on hold on heparin gtt in preparation for potential intervention. Surgery was consulted for  retroperitoneal hematoma measuring approximately 20.7 x 8.7 x. 5.2 cm extending from the left posterior pararenal space into the left iliacus and anterior proximal left thigh.    Objective    Vitals  Vital Signs Last 24 Hrs  T(C): 36.6 (08-31-23 @ 00:19), Max: 36.9 (08-30-23 @ 18:00)  T(F): 97.9 (08-31-23 @ 00:19), Max: 98.5 (08-30-23 @ 18:00)  HR: 98 (08-31-23 @ 00:19) (63 - 98)  BP: 93/55 (08-31-23 @ 00:19) (93/55 - 117/66)  BP(mean): --  RR: 19 (08-31-23 @ 00:19) (16 - 20)  SpO2: 94% (08-31-23 @ 00:19) (94% - 99%)    Orthostatic VS  08-29-23 @ 16:54  Lying BP: 117/77 HR: 85  Sitting BP: 115/69 HR: 88  Standing BP: --/-- HR: --  Site: upper left arm  Mode: electronic    Physical Exam  General: Comfortable in bed  HEENT: moist mucus membranes  Neuro: AOx4  Resp: Saturating well on RA  Abdomen: soft, NTND  Extremities: warm, bilateral palpable pulses    Laboratory                        9.0    13.92 )-----------( 262      ( 30 Aug 2023 20:19 )             26.3       08-30    132<L>  |  95<L>  |  17  ----------------------------<  114<H>  4.5   |  24  |  0.90    Ca    9.2      30 Aug 2023 21:49  Mg     1.9     08-30    TPro  7.1  /  Alb  3.3  /  TBili  1.1  /  DBili  0.2  /  AST  79<H>  /  ALT  29  /  AlkPhos  70  08-30              PT/INR - ( 30 Aug 2023 21:49 )   PT: 19.0 sec;   INR: 1.84 ratio         PTT - ( 30 Aug 2023 21:49 )  PTT:65.8 sec

## 2023-08-31 NOTE — PROCEDURE NOTE - PLAN
May restart heparin at previous rate, NO BOLUS.  4 hours of bedrest with right lower extremity extended.

## 2023-08-31 NOTE — RAPID RESPONSE TEAM SUMMARY - NSOTHERINTERVENTIONSRRT_GEN_ALL_CORE
patient has hoffmann, continue with strict I+O  diuresis as BP tolerates  CBC q4h until stable  wean NRB as tolerated  Can consider BiPAP for flash pulmonary edema, however patient's hypotension may be a limiting factor for NIPPV  follow up cardiac enzymes per primary team

## 2023-08-31 NOTE — CHART NOTE - NSCHARTNOTEFT_GEN_A_CORE
-----------------------------------------------------------  Interventional Radiology Brief Consult Note  -----------------------------------------------------------    Reason for Referral: RP hematoma    Clinical Summary: 88 y.o. F with slowly downtrending h&h on ac for mechanical valve found to have an RP hematoma, with a small focus of active arterial extravasation on CTA and decreased h&h. IR consulted for possible embolization.     Vitals:  T(F): 98 (08-31-23 @ 02:03), Max: 98.5 (08-30-23 @ 18:00)  HR: 99 (08-31-23 @ 02:03) (63 - 99)  BP: 95/61 (08-31-23 @ 02:03) (93/55 - 117/66)  RR: 18 (08-31-23 @ 02:03) (16 - 20)  SpO2: 94% (08-31-23 @ 02:03) (94% - 99%)    Labs:           7.6  13.76)-----(268     (08-31-23 @ 00:47)         22.8     132 | 95 | 17  --------------------< 114     (08-30-23 @ 21:49)  4.5 | 24 | 0.90       PT: 19.0<H> 08-30-23 @ 21:49  aPTT: 65.8<H> 08-30-23 @ 21:49   INR: 1.84<H> 08-30-23 @ 21:49    Imaging: CTA abdomen pelvis with a small focus of arterial extravasation     Assessment and plan:    88 y.o. F with slowly downtrending h&h on ac for mechanical valve found to have an RP hematoma, with a small focus of active arterial extravasation on CTA and decreased h&h. IR consulted for possible embolization.   Recommend resuscitation and repeat cbc to evaluate for response. If patient does not appropriately respond, please reach IR back.   Additionally recommend discontinuation of ac if possible, given RP hematoma is likely due to patient's prior supratherapeutic INR and ac use.   IR following.     Note not finalized until attending attestation. -----------------------------------------------------------  Interventional Radiology Brief Consult Note  -----------------------------------------------------------    Reason for Referral: RP hematoma    Clinical Summary: 88 y.o. F with slowly downtrending h&h on ac for mechanical valve found to have an RP hematoma, with a small focus of active arterial extravasation on CTA and decreased h&h. IR consulted for possible embolization.     Vitals:  T(F): 98 (08-31-23 @ 02:03), Max: 98.5 (08-30-23 @ 18:00)  HR: 99 (08-31-23 @ 02:03) (63 - 99)  BP: 95/61 (08-31-23 @ 02:03) (93/55 - 117/66)  RR: 18 (08-31-23 @ 02:03) (16 - 20)  SpO2: 94% (08-31-23 @ 02:03) (94% - 99%)    Labs:           7.6  13.76)-----(268     (08-31-23 @ 00:47)         22.8     132 | 95 | 17  --------------------< 114     (08-30-23 @ 21:49)  4.5 | 24 | 0.90       PT: 19.0<H> 08-30-23 @ 21:49  aPTT: 65.8<H> 08-30-23 @ 21:49   INR: 1.84<H> 08-30-23 @ 21:49    Imaging: CTA abdomen pelvis with a small focus of arterial extravasation     Assessment and plan:    88 y.o. F with slowly downtrending h&h on ac for mechanical valve found to have an RP hematoma, with a small focus of active arterial extravasation on CTA and decreased h&h. IR consulted for possible embolization.   Recommend resuscitation and repeat cbc to evaluate for response. If patient does not appropriately respond, please reach IR back.   Additionally recommend discontinuation of ac if possible, given RP hematoma is likely due to patient's prior supratherapeutic INR and ac use.   An abdominal binder may also help control any small bleeding.   IR following.     Note not finalized until attending attestation.

## 2023-08-31 NOTE — PROGRESS NOTE ADULT - PROBLEM SELECTOR PLAN 3
#Acute blood loss anemia   #RP bleed   Drop in Hgb while on heparin gtt. CTA AP:  left retroperitoneal hematoma with predominant intramuscular component within the iliacus muscle. left psoas intramuscular hematoma with an arterially enhancing focus, suggestive of active hemorrhage.   -S/p 1u pRBC  -IR consulted overnight - plan for possible embolization this morning.   -D/w cardiology re: holding AC for now (a-fib, mechanical AVR)  -D/w patient and daughter at bedside - while being off AC, there is risk for cardioembolic event, however at this time decompensation from bleeding risk is higher given drop in H/H, soft BP, and moderate sized RP bleed seen on CT. Pt and family in agreement of temporarily holding heparin gtt.   -Post embolization d/w IR re: timing of resuming hep gtt  -CBC q6h, coags #Acute blood loss anemia   #RP bleed   Drop in Hgb while on heparin gtt. CTA AP:  left retroperitoneal hematoma with predominant intramuscular component within the iliacus muscle. left psoas intramuscular hematoma with an arterially enhancing focus, suggestive of active hemorrhage.   -S/p 1u pRBC  -IR consulted overnight - plan for possible embolization this morning.   -D/w cardiology re: holding AC for now (a-fib, mechanical AVR)  -D/w patient and daughter at bedside - while being off AC, there is risk for cardioembolic event, however at this time decompensation from bleeding is higher given drop in H/H and moderate sized RP bleed seen on CT. Pt and family in agreement of temporarily holding heparin gtt.   -Post embolization d/w IR re: timing of resuming hep gtt  -CBC q6h, coags

## 2023-08-31 NOTE — CHART NOTE - NSCHARTNOTEFT_GEN_A_CORE
Patient was placed on hep gtt, found to have RP bleed on 8/30 s/p pelvic angiogram and IR coil embolization of left lumbar branch on 8/31. RRT called after procedure for hypotension, hypoxia and worsening lethargy.  Rapid response team called.  (See rapid response note for more information).  Daughter and son decided on DNR/DNI.  MOLST in chart.  Patient was then transferred back to [facility].        CT chest/abdomen/pelvis******PRELIMINARY REPORT******           INTERPRETATION:  Stable size of left retroperitoneal hematoma. There are   new internal areas of hyperdensity, likely representing extravasated   contrast from exam done on 8/30/23, when there was a focus of active   hemorrhage. Cannot exclude current active bleed. Discussed with Dr. Davis.    New bilateral pleural effusions. Groundglass opacities in the bilateral   uppe and lower lobes. Interlobular septal thickening compatible with   pulmonary edema.New lingular nodular opacities since 8/19/23. Likely   infectious etiology.    Follow up final report.      On nonrebreather satting at 97%, will wean as tolerated.    Lasix 20mg IV x1 for pleural effusions.  If increasing oxygen requirements, give an additional dose of lasix if blood pressure allows.  Opacities on chest CT: started on Levofloxacin and pending blood cultures and MRSA PCR.   Troponin 223, will trend q6h to peak.   IV tylenol x1 for pain.  Morphine 2mg IV q6h PRN if blood pressure allows.   CBC q6H, if 2 stable CBC 6 hours apart, can restart heparin gtt with lower aptt goal (50-70)  DNR/DNI, MOLST in chart.   Palliative consult in the AM  Will relay to the night team    Pilar Barnes PA-C  Medicine Patient was placed on hep gtt, found to have RP bleed on 8/30 s/p pelvic angiogram and IR coil embolization of left lumbar branch on 8/31. RRT called after procedure for hypotension, hypoxia and worsening lethargy.  Rapid response team called.  (See rapid response note for more information).  Daughter and son decided on DNR/DNI.  MOLST in chart.  Patient was then transferred back to [facility].        CT chest/abdomen/pelvis******PRELIMINARY REPORT******           INTERPRETATION:  Stable size of left retroperitoneal hematoma. There are   new internal areas of hyperdensity, likely representing extravasated   contrast from exam done on 8/30/23, when there was a focus of active   hemorrhage. Cannot exclude current active bleed. Discussed with Dr. Davis.    New bilateral pleural effusions. Groundglass opacities in the bilateral   uppe and lower lobes. Interlobular septal thickening compatible with   pulmonary edema.New lingular nodular opacities since 8/19/23. Likely   infectious etiology.    Follow up final report.      On nonrebreather satting at 97%, will wean as tolerated.    Lasix 20mg IV x1 for pleural effusions.  If increasing oxygen requirements, give an additional dose of lasix if blood pressure allows.  Opacities on chest CT: started on Levofloxacin and pending blood cultures and MRSA PCR.   Troponin 223, will trend q6h to peak.   IV tylenol x1 for pain.  Morphine 2mg IV q6h PRN if blood pressure allows.   CBC q6H, if 2 stable CBC 6 hours apart, can restart heparin gtt with lower aptt goal (50-70)  DNR/DNI, MOLST in chart.   Palliative consult in the AM  Attending notified  Will relay to the night team    Pilar Barnes PA-C  Medicine

## 2023-08-31 NOTE — CONSULT NOTE ADULT - ASSESSMENT
88 year old female with past medical  A.fib and Mechanical AVR ([facility]) on coumadin, CAD, HTN/HLD, hypothyroidism, history of cholelithiasis/acute cholecystitis (managed conservatively 8/2022), surgery was consulted for a retroperitoneal hematoma.    Recommendations:  - No acute surgical intervention at this time.  - Trend CBC q6  - CTA  - IR consult if there is an active bleeding    ACS  [id number]

## 2023-08-31 NOTE — RAPID RESPONSE TEAM SUMMARY - NSADDTLFINDINGSRRT_GEN_ALL_CORE
Vitals on initial assessment: 96.2F, 92/59, , RR 21, satting 89% on NRB. . Patient was AAO3 and mentating appropriately. Unofficial POCUS with diffuse anterior b-lines, pleff unable to be appreciated, and abnormal cardiac squeeze. FAST exam negative. femoral site without ecchymosis, or hematoma. EKG with new ST depression in inferolateral leads (lead I, II, V4, V5, V6) and ST elevation in V1 and aVR.  Vitals on initial assessment: 96.2F, 92/59, , RR 21, satting 89% on NRB. . Patient was AAO3 and mentating appropriately. Unofficial POCUS with diffuse anterior b-lines, pleff unable to be appreciated, and abnormal cardiac squeeze. FAST exam negative. femoral site without ecchymosis, or hematoma. EKG with new ST depression in inferolateral leads (lead I, II, V4, V5, V6) and ST elevation in V1 and aVR. Cardiology was called, likely due to demand ischemia. CXR with pulmonary edema.  Vitals on initial assessment: 96.2F, 92/59, , RR 21, satting 89% on NRB. . Patient was AAO3 and mentating appropriately. Unofficial POCUS with diffuse anterior b-lines, pleff unable to be appreciated, and abnormal cardiac squeeze. FAST exam negative. femoral site without ecchymosis, or hematoma. EKG with new ST depression in inferolateral leads (lead I, II, V4, V5, V6) and ST elevation in V1 and aVR. Cardiology was called, symptoms likely due to demand ischemia. CXR with pulmonary edema. CBC, CMP/Mg/Phos, ABG with lytes, cardiac panel ordered Vitals on initial assessment: 96.2F, 92/59, , RR 21, satting 89% on NRB. . Patient was AAO3 and mentating appropriately. Unofficial POCUS with diffuse anterior b-lines, pleff unable to be appreciated, and abnormal cardiac squeeze. FAST exam negative. femoral site without ecchymosis, or hematoma. EKG with new ST depression in inferolateral leads (lead I, II, V4, V5, V6) and ST elevation in V1 and aVR. Cardiology was called, symptoms likely due to demand ischemia. CXR with pulmonary edema. CBC, CMP/Mg/Phos, ABG with lytes, cardiac panel ordered. CT CAP ordered, with new b/l pleff c/f pulmonary edema, stable hematoma size. Patient likely suffered from flash pulmonary edema from demand ischemia for hypoxia.  Vitals on initial assessment: 96.2F, 92/59, , RR 21, satting 89% on NRB. . Patient was AAO3 and mentating appropriately. Unofficial POCUS with diffuse anterior b-lines, and grossly abnormal cardiac squeeze. FAST exam negative. femoral site without ecchymosis, or hematoma. EKG with new ST depression in inferolateral leads (lead I, II, V4, V5, V6) and ST elevation in V1 and aVF. Symptoms likely due to demand ischemia or ACS. Cardiology was called. CXR with pulmonary edema. CBC, CMP/Mg/Phos, ABG with lytes, cardiac panel, coag, type and screen ordered. ABG wnl, Hb stable. CT CAP ordered, with new b/l pleff c/f pulmonary edema, stable hematoma size. For hypoxia, patient likely suffered from flash pulmonary edema from cardiac ischemia. For the hypotension, likely hemorrhagic/hypovolemic vs cardiogenic. Less likely obstructive given pleff. Not likely septic. Patient's blood pressure remained stable in the SBP mid 90s to low 100s, and patient was mentating appropriately throughout the rapid. IV pressors were not started. Per conversation with daughter at bedside, GOC clarified to DNR/DNI no pressors, Okay with blood transfusions and IV fluids. Transferred to DSU on NRB.  Vitals on initial assessment: 96.2F, 92/59, , RR 21, satting 89% on NRB. . Patient was AAO3 and mentating appropriately, reports chest pain. Unofficial POCUS with diffuse anterior b-lines, and grossly abnormal cardiac squeeze. FAST exam negative. femoral site without ecchymosis, or hematoma. EKG with new ST depression in inferolateral leads (lead I, II, V4, V5, V6) and ST elevation in V1 and aVF. Symptoms likely due to demand ischemia or ACS. Cardiology called, likely demand ischemia. CXR with pulmonary edema. CBC, CMP/Mg/Phos, ABG with lytes, cardiac panel, coag, type and screen ordered. ABG wnl, Hb stable. CT CAP ordered, with new b/l pleff c/f pulmonary edema, stable hematoma size. For hypoxia, patient likely suffered from flash pulmonary edema from cardiac ischemia. For the hypotension, likely hemorrhagic/hypovolemic vs cardiogenic. Less likely obstructive given pleff. Not likely septic. Patient's blood pressure remained stable in the SBP mid 90s to low 100s, and patient was mentating appropriately throughout the rapid. IV pressors were not started. Per conversation with daughter at bedside, C clarified to DNR/DNI no pressors, Okay with blood transfusions and IV fluids. Transferred to DSU on NRB.

## 2023-08-31 NOTE — PRE PROCEDURE NOTE - PRE PROCEDURE EVALUATION
------------------------------------------------------------  Interventional Radiology Pre-Procedure Note  ------------------------------------------------------------    Indication: 88y Female with downtrending h&h found to have an RP bleed on ac for mechanical valve s/p 1u prbc presents for angiogram and possible embolization today.     Past Medical History:  S/P AVR    Atrial fibrillation    Hypothyroidism        Allergies: penicillin (Rash)  sulfa drugs (Rash)      Medications:  aspirin enteric coated: 81 milliGRAM(s) Oral (08-30-23 @ 12:01)  furosemide    Tablet: 20 milliGRAM(s) Oral (08-30-23 @ 09:44)  heparin  Infusion: 6 mL/Hr IV Continuous (08-30-23 @ 21:56)  heparin  Infusion.: 600 Unit(s)/Hr IV Continuous (08-29-23 @ 13:54)  metoprolol tartrate: 25 milliGRAM(s) Oral (08-30-23 @ 18:03)      Vital Signs:   T(F): 98 (10:47), Max: 98.6 (08:22)  HR: 102 (10:47)  BP: 99/61 (10:47)  RR: 18 (10:47)  SpO2: 93% (10:47)    Labs:           10.4  12.70)-----(246     (08-31-23 @ 06:21)         30.9     131 | 95 | 17  --------------------< 99     (08-31-23 @ 06:21)  4.2 | 22 | 0.87       PT: -- 08-31-23 @ 06:21  aPTT: 29.4 08-31-23 @ 06:21   INR: -- 08-31-23 @ 06:21    Imaging: CTA abdomen pelvis 8/31/23 was reviewed with concern for a small area of active extravasation in the left RP hematoma    Consent: Risks/benefits/alternatives were explained and informed written consent was obtained.     Procedure Plan: Plan for pelvic angiogram and possible embolization.

## 2023-08-31 NOTE — PROGRESS NOTE ADULT - NS ATTEND OPT1 GEN_ALL_CORE
SUBJECTIVE:   Kimmy Lindsey is a 59 year old female who presents to clinic today for the following health issues:    Vaginal Symptoms      Duration: 9 days    Description  vaginal discharge - yellow    Intensity:  mild, moderate    Accompanying signs and symptoms (fever/dysuria/abdominal or back pain): None    History  Sexually active: yes, single partner, contraception - none and postmenopausal  Possibility of pregnancy: No  Recent antibiotic use: no     Precipitating or alleviating factors: None    Therapies tried and outcome: none      Discharge began rather suddenly.  No associated itching, irritation or odor.  Has been a significant volume.  Some external irritation now due to moisture.  Has some vaginal dryness, uses over the counter lubricant.  No new products.  No new partners.  Has other wise been feeling well.  No vaginal bleeding.    Problem list and histories reviewed & adjusted, as indicated.  Additional history: as documented    Reviewed and updated as needed this visit by clinical staffTobacco  Allergies  Meds  Med Hx  Surg Hx  Fam Hx  Soc Hx      Reviewed and updated as needed this visit by Provider  Tobacco  Med Hx  Surg Hx  Fam Hx  Soc Hx        ROS: Remainder of Constitutional, CV, Respiratory, GI,  negative with exception of that mentioned above    PE:  VS as above   Gen:  WN/WD/WH female in NAD   Abd: soft, postive bowel sounds, NT/ND, no HSM, no rebounding/guarding/ridigity   :  normal appearing external female gentalia without lesions, vaginal and cervical atrophy with punctate hemorrhages noted, no lesions.  Copious thin yellow discharge present.  Bimanual exam reveals normally shaped and sized uterus, no cervical motion or adnexal tenderness    Wet prep negative    A/P:      ICD-10-CM    1. Vaginitis and vulvovaginitis N76.0 Wet prep     US Pelvic Complete with Transvaginal   2. Screening for cervical cancer Z12.4 Pap imaged thin layer screen with HPV - recommended age 30  - 65 years (select HPV order below)     HPV High Risk Types DNA Cervical     Patient Instructions   The swab we did today for you today was negative for any bacterial overgrowth causing the discharge.      We'll go ahead with an ultrasound and make sure that is normal and can consider a vaginal estrogen as perhaps this is coming from some atrophy if the tissues.    You can call [phone] to schedule the ultrasound    Unclear etiology.  Possibly all atrophy related but significant volume noted.  Plan u/s as ordered for eval.         I attest my time as attending is greater than 50% of the total combined time spent on qualifying patient care activities by the PA/NP and attending.

## 2023-09-01 NOTE — PROGRESS NOTE ADULT - PROBLEM SELECTOR PROBLEM 7
Stage 3 chronic kidney disease
Stage 3 chronic kidney disease
Elevated bilirubin
Stage 3 chronic kidney disease
PNA (pneumonia)
Anemia

## 2023-09-01 NOTE — PROGRESS NOTE ADULT - ASSESSMENT
88 female with Severe MR, acute on Chronic HFpEF1.) Severe Mitral Regurgitation and TR: Patient was admitted with SOB and fluid overload at [facility] and was transferred here for further evaluation of her MR. Anemic yesterday, found to have RP bleed on abdominal CT.     Now POD #1 s/p IR pelvic angiogram with successful coil embolization of extravasation from a left lumbar branch  FAREED for structural evaluation of MR scheduled yesterday was cancelled due to anemia.   At this time, patient prefers a conservative approach with continued medical management of her CHF symptoms. She will follow up in our office as an outpatient once recovered to discuss further testing and possible treatment options.   No further structural inpatient workup at this time.     BRITTNEY Blair NP  [id number]  Available on TEAMS
88 year old female with past medical  A.fib and Mechanical AVR ([facility]) on coumadin, CAD, HTN/HLD, hypothyroidism, history of cholelithiasis/acute cholecystitis (managed conservatively 8/2022), who initially presented to  from home with few week history of worsening shortness of breath. At [place] found to have acute hypoxic respiratory failure due to acute on chronic diastolic heart failure due to severe MR/TR, NSTEMI type 2, persistent aflutter with RVR, and b/l pleural effusions - mild loculated R effusion + 1.4cm paratracheal LN. Tx to NS for structural heart eval and possible cardioversion
Comfortable and no dyspneic at rest.  Getting FAREED today.  TTE here does not show as severe mitral and tricuspid regurgitation as the echo done at [place].    Na and leg pain continue to be problems.  If FAREED does not show severe enough MR to account for CHF exacerbation, would be particularly interested in trying to get her back in NSR.  Can continue with present for now.   LANE Alonzo  [phone]
Per above notes had type II myocardial infarct yesterday after she became hypotensive during embolization to lumbar arteries to stop retroperitoneal bleed.  Had troponin of 646 and MBCPK of 126 and has new wall motion abnormality on echo.  Is not having pain at present and ST changes noted at the time have now resolved.  She is not in any overt CHF, but BP's remain little low.   (She had known CAD in the past.  Her right coronary artery was closed at the time of her valve replacement, but was not bypassed.)    Is on heparin and metoprolol currently and think can continue as is.  Would continue to hold diuretics unless she becomes SOB.  Workup for valvular heart disease on hold for the moment.     LANE Alonzo  [phone]
88 year old female with past medical  A.fib and Mechanical AVR ([facility]) on coumadin, CAD, HTN/HLD, hypothyroidism, history of cholelithiasis/acute cholecystitis (managed conservatively 8/2022), surgery was consulted for a retroperitoneal hematoma. CT A/P with evidence of active bleeding now s/p IR embolization of lumbar arteries x 2.  RRT called in IR PACU. Repeat H/H stable. CT A/P noncon with unchanged size of L RP hematoma    Recommendations:  - No acute surgical intervention at this time  - No surgical contraindication to advancing diet  - Low concern for continued bleeding  - Pain control  - Care per primary  - Please repage as needed    ACS  [id number]      
88 year old female with past medical  A.fib and Mechanical AVR ([facility]) on coumadin, CAD, HTN/HLD, hypothyroidism, history of cholelithiasis/acute cholecystitis (managed conservatively 8/2022), who initially presented to  from home with few week history of worsening shortness of breath. At [place] found to have acute hypoxic respiratory failure due to acute on chronic diastolic heart failure due to severe MR/TR, NSTEMI type 2, persistent aflutter with RVR, and b/l pleural effusions - mild loculated R effusion + 1.4cm paratracheal LN. Tx to NS for structural heart eval and possible cardioversion
88 year old female with with PMH of A.fib and Mechanical AVR ([facility]) on coumadin, CAD, HTN/HLD, hypothyroidism, history of cholelithiasis/acute cholecystitis (managed conservatively 8/2022) with worsening hyponatremia.
88 year old female with past medical  A.fib and Mechanical AVR ([facility]) on coumadin, CAD, HTN/HLD, hypothyroidism, history of cholelithiasis/acute cholecystitis (managed conservatively 8/2022), surgery was consulted for a retroperitoneal hematoma.    Recommendations:  - No acute surgical intervention at this time.  - Pt given 1 unit PRBC, rpt H/H 10.4 this am. Continue to Trend CBC q6  - CT A/P w/ Contrast shows- " Tiny hyperdense focus seen within the previously described retroperitoneal hematoma on arterial phase sequence not seen on non-contrast phase, concerning for active bleed".   - Recommend IR consult    -Will continue to monitor closely.     ACS  [id number]      
Hypotenatremia
88 year old female with past medical  A.fib and Mechanical AVR ([facility]) on coumadin, CAD, HTN/HLD, hypothyroidism, history of cholelithiasis/acute cholecystitis (managed conservatively 8/2022), who initially presented to  from home with few week history of worsening shortness of breath. At [place] found to have acute hypoxic respiratory failure due to acute on chronic diastolic heart failure due to severe MR/TR, NSTEMI type 2, persistent aflutter with RVR, and b/l pleural effusions - mild loculated R effusion + 1.4cm paratracheal LN. Tx to NS for structural heart eval and possible cardioversion
88 year old female with past medical  A.fib and Mechanical AVR ([facility]) on coumadin, CAD, HTN/HLD, hypothyroidism, history of cholelithiasis/acute cholecystitis (managed conservatively 8/2022), who initially presented to  from home with few week history of worsening shortness of breath. At [place] found to have acute hypoxic respiratory failure due to acute on chronic diastolic heart failure due to severe MR/TR, NSTEMI type 2, persistent aflutter with RVR, and b/l pleural effusions - mild loculated R effusion + 1.4cm paratracheal LN. Tx to NS for structural heart eval and possible cardioversion
88 year old female with past medical  A.fib and Mechanical AVR ([facility]) on coumadin, CAD, HTN/HLD, hypothyroidism, history of cholelithiasis/acute cholecystitis (managed conservatively 8/2022), who initially presented to  from home with few week history of worsening shortness of breath. At [place] found to have acute hypoxic respiratory failure due to acute on chronic diastolic heart failure due to severe MR/TR, NSTEMI type 2, persistent aflutter with RVR, and b/l pleural effusions - mild loculated R effusion + 1.4cm paratracheal LN. Tx to NS for structural heart eval and possible cardioversion
88 year old female with past medical  A.fib and Mechanical AVR ([facility]) on coumadin, CAD, HTN/HLD, hypothyroidism, history of cholelithiasis/acute cholecystitis (managed conservatively 8/2022), who initially presented to  from home with few week history of worsening shortness of breath. At [place] found to have acute hypoxic respiratory failure due to acute on chronic diastolic heart failure due to severe MR/TR, NSTEMI type 2, persistent aflutter with RVR, and b/l pleural effusions - mild loculated R effusion + 1.4cm paratracheal LN. Tx to NS for structural heart eval and possible cardioversion

## 2023-09-01 NOTE — CONSULT NOTE ADULT - PROBLEM SELECTOR RECOMMENDATION 5
HCP- pt is  with 2 adult children who are closely involved, Sandrita is taking the lead in participating in conversations in the hospital  Code status- DNR/I, MOLST completed  GOC: monitor for clinical recovery before making further decisions about next steps

## 2023-09-01 NOTE — OCCUPATIONAL THERAPY INITIAL EVALUATION ADULT - PERTINENT HX OF CURRENT PROBLEM, REHAB EVAL
88 year old female with past medical  A.fib and Mechanical AVR ([facility]) on coumadin, CAD, HTN/HLD, hypothyroidism, history of cholelithiasis/acute cholecystitis (managed conservatively 8/2022), who initially presented to  from home with few week history of worsening shortness of breath. At [place] found to have acute hypoxic respiratory failure due to acute on chronic diastolic heart failure due to severe MR/TR, NSTEMI type 2, persistent aflutter with RVR, and b/l pleural effusions - mild loculated R effusion + 1.4cm paratracheal LN. Tx to NS for structural heart eval and possible cardioversion

## 2023-09-01 NOTE — CONSULT NOTE ADULT - TIME BILLING
Review of medical records, labs, imaging and coordination of care.
Symptom assessment and management, supportive counseling, coordination of care

## 2023-09-01 NOTE — CONSULT NOTE ADULT - ATTENDING COMMENTS
ATTENDING ATTESTATION  I have seen and examined this patient with the resident housestaftf. I have reviewed all labs, imaging and reports. I have participated in formulating the plan, and have read and agree with the history, ROS, exam, assessment and plan as stated above.     Dx: spontaneous retroperitoneal hematoma  CT scan performed 8/30 for decreasing Hg shows a large RP hematoma (non-con, cannot assess for contrast extrav).  Likely related to supratherapeutic INR (>4) this admission. Now normalized, on heparin gtt.   Hg 12-->9 over the course of 3 days. No transfusions.   Based on this trend, I doubt that there is arterial extravasation.   However, I recognize that this patient needs continued AC due to her mechanical heart valve. Therefore, I recommend continuing the heparin gtt for now, transfuse as needed, and obtaining a CTA (with venous phase) to look for extrav that may be amenable to IR intervention.     Total time spent in the care of this patient today (excluding critical care, teaching, & procedures): 55 min                 Over 50% of the total time was spent on counseling and coordination of care.     Chelsea House M.D., M.S.  Dept of Trauma, Emergency General Surgery and Critical Care
Asked to see this patient for management of atrial fibrillation.  Patient seen by Dr. Alonzo.    Afib is currently rate controlled on metoprolol 25 mg bid.   Recent RP bleed requiring IR embolization, restart anticoagulation for mechanical AVR and Afib goal 2-3, when deemed acceptable bleeding risk.    1. AFib   2. ADHF  3. Severe MR  4. Severe TR
87yo F w/ PMH of HFpEF, severe MR, TR, AFib, mechanical AVR on coumadin, CAD, HTN, hypothyroidism transferred from [facility] where was being managed for CHF exacerbation. Imaging since admission to OSH has shown improvement in B/L pleural effusions.  Pulmonary consulted for evaluation of effusions and enlarged lymph node. Bedside ultrasound showed minimal effusion present that would not be amenable to thoracentesis.  Patient will need follow-up Chest CT 6 weeks from the most recent.  Should follow-up with me at [address] [place] [address] as outpatient.    Analia Palafox MD
# Hypotonic Hyponatremia - ADH-mediated (Urine Osm high). Urine sodium 50, clinically euvolemic. 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 Start salt tabs 2g BID.       The rest of the recommendations as per fellow's note.    Radha Simons MD  Attending Nephrologist  [phone] or via [employer]

## 2023-09-01 NOTE — CONSULT NOTE ADULT - PROVIDER SPECIALTY LIST ADULT
Cardiology
Pulmonology
Nephrology-Cardiorenal
Surgery
Rehab Medicine
Cardiology
Cardiology
Structural Heart
Palliative Care

## 2023-09-01 NOTE — RAPID RESPONSE TEAM SUMMARY - NSSITUATIONBACKGROUNDRRT_GEN_ALL_CORE
88 year old female with past medical  A.fib and Mechanical AVR ([facility]) on coumadin, CAD, HTN/HLD, hypothyroidism, history of cholelithiasis/acute cholecystitis (managed conservatively 8/2022), surgery was consulted for a retroperitoneal hematoma. CT A/P with evidence of active bleeding now s/p IR embolization of lumbar arteries     RRT was called for hypotension. Upon arrival patient appeared lethargic and vitals reveal BP of 60s/40s and HR in 100s. Patient stated she was tired and lightheaded. Ordered 500cc bolus initially and upon reviewing charts, patient has history of structural heart disease resulting in heart failure, recent RP bleed s/p embolization and patient now on heparin gtt. Ordered CBC, CMP, trops, CKMB, VBG, and ekg. Upon discussion with daughter who is the HCP, the daughter stated that her goal is to ensure a good quality of life rather than prolonging quantity. I discussed the risk and benefits of starting vasopressors and at this time the daughter stated she would not want to prolong suffering and wishes to transition patient to comfort care instead. MOLST form signed at bedside and palliative team witnessed and signed as well. Primary team to ensure palliative orders are placed. Blood work was discarded given updated GOC.     Primary team can consider the following orders for symptomatic control: Order IV dilaudid 1 mg q1 prn dyspnea/pain, IV ativan 0.5 mg q1 prn agitation/anxiety, glycopyrrolate 0.4 mg q6 prn secretions.     88 year old female with past medical  A.fib and Mechanical AVR ([facility]) on coumadin, CAD, HTN/HLD, hypothyroidism, history of cholelithiasis/acute cholecystitis (managed conservatively 8/2022), surgery was consulted for a retroperitoneal hematoma. CT A/P with evidence of active bleeding now s/p IR embolization of lumbar arteries     RRT was called for hypotension. Upon arrival patient appeared lethargic and vitals reveal BP of 60s/40s and HR in 100s. Patient stated she was tired and lightheaded. Ordered 500cc bolus initially and upon reviewing charts, patient has history of structural heart disease resulting in heart failure, recent RP bleed s/p embolization and patient now on heparin gtt. Ordered CBC, CMP, trops, CKMB, VBG, and ekg. Noted recent beta blocker use at 6pm. Exam revealed lethargic patient, breath sounds clear b/l, tachycardiac but S1/S2 present, no obvious bruising noted in back, neg cullens and grey messina sign. Upon discussion with daughter who is the HCP, the daughter stated that her goal is to ensure a good quality of life rather than prolonging quantity. I discussed the risk and benefits of starting vasopressors and at this time the daughter stated she would not want to prolong suffering and wishes to transition patient to comfort care instead. MOLST form signed at bedside and palliative team witnessed and signed as well. Primary team to ensure palliative orders are placed(DC lab checks/vital checks/if possible PCU in AM). Blood work was discarded given updated GOC.     Primary team can consider the following orders for symptomatic control: Order IV dilaudid 1 mg q1 prn dyspnea/pain, IV ativan 0.5 mg q1 prn agitation/anxiety, glycopyrrolate 0.4 mg q6 prn secretions.     88 year old female with past medical  A.fib and Mechanical AVR ([facility]) on coumadin, CAD, HTN/HLD, hypothyroidism, history of cholelithiasis/acute cholecystitis (managed conservatively 8/2022), surgery was consulted for a retroperitoneal hematoma. CT A/P with evidence of active bleeding now s/p IR embolization of lumbar arteries     RRT was called for hypotension. Upon arrival patient appeared lethargic and vitals reveal BP of 60s/40s and HR in 100s. Patient stated she was tired and lightheaded. Ordered 500cc bolus initially and upon reviewing charts, patient has history of structural heart disease resulting in heart failure, recent RP bleed s/p embolization and patient now on heparin gtt. Ordered CBC, CMP, trops, CKMB, VBG, and ekg. Noted recent beta blocker use at 6pm. Exam revealed lethargic patient, breath sounds clear b/l, tachycardiac but S1/S2 present, no obvious bruising noted in back, neg cullens and grey messina sign. Upon discussion with daughter who is the HCP, the daughter stated that her goal is to ensure a good quality of life rather than prolonging quantity. I discussed the risk and benefits of starting vasopressors and at this time the daughter stated she would not want to prolong suffering and wishes to transition patient to comfort care instead. MOLST form signed at bedside and primary team witnessed and signed as well. Primary team to ensure palliative orders are placed(DC lab checks/vital checks/if possible PCU in AM). Blood work was discarded given updated GOC. Differential remain wide given patient's complex history from distributive/hemorrhagic/obstructive/cardiogenic shock but patient's goc has changed.    Primary team can consider the following orders for symptomatic control: Order IV dilaudid 1 mg q1 prn dyspnea/pain, IV ativan 0.5 mg q1 prn agitation/anxiety, glycopyrrolate 0.4 mg q6 prn secretions.     88 year old female with past medical  A.fib and Mechanical AVR ([facility]) on coumadin, CAD, HTN/HLD, hypothyroidism, history of cholelithiasis/acute cholecystitis (managed conservatively 8/2022), surgery was consulted for a retroperitoneal hematoma. CT A/P with evidence of active bleeding now s/p IR embolization of lumbar arteries     RRT was called for hypotension. Upon arrival patient appeared lethargic and vitals reveal BP of 60s/40s and HR in 100s. Patient stated she was tired and lightheaded. Ordered 500cc bolus initially and upon reviewing charts, patient has history of structural heart disease resulting in heart failure, recent RP bleed s/p embolization and patient now on heparin gtt. Ordered CBC, CMP, trops, CKMB, VBG, and ekg. Noted recent beta blocker use at 6pm. Exam revealed lethargic patient, breath sounds clear b/l, tachycardiac but S1/S2 present, no obvious bruising noted in back, neg cullens and grey messina sign. Upon discussion with daughter who is the HCP, the daughter stated that her goal is to ensure a good quality of life rather than prolonging quantity. I discussed the risk and benefits of starting vasopressors and at this time the daughter stated she would not want to prolong suffering and wishes to transition patient to comfort care instead as patient has gone through RRTs prior and is presenting similarly again. Daughter stated she does not want to see her mom going through the same thing over and over again, and at this time would want to ensure patient has highest quality of life over prolong quantity. MOLST form signed at bedside and primary team witnessed and signed as well. Primary team to ensure palliative orders are placed(DC lab checks/vital checks/if possible PCU in AM). Blood work was discarded given updated GOC. Differential remain wide given patient's complex history from distributive/hemorrhagic/obstructive/cardiogenic shock but patient's goc has changed.    Primary team can consider the following orders for symptomatic control: Order IV dilaudid 1 mg q1 prn dyspnea/pain, IV ativan 0.5 mg q1 prn agitation/anxiety, glycopyrrolate 0.4 mg q6 prn secretions.     88 year old female with past medical  A.fib and Mechanical AVR ([facility]) on coumadin, CAD, HTN/HLD, hypothyroidism, history of cholelithiasis/acute cholecystitis (managed conservatively 8/2022), surgery was consulted for a retroperitoneal hematoma. CT A/P with evidence of active bleeding now s/p IR embolization of lumbar arteries     RRT was called for hypotension. Upon arrival patient appeared lethargic and vitals reveal BP of 60s/40s and HR in 100s. Patient stated she was tired and lightheaded. Ordered 500cc bolus initially and upon reviewing charts, patient has history of structural heart disease resulting in heart failure, recent RP bleed s/p embolization and patient now on heparin gtt. Ordered CBC, CMP, trops, CKMB, VBG, and ekg. Noted recent beta blocker use at 6pm. Heparin gtt was held during RRT. Exam revealed lethargic patient, breath sounds clear b/l, tachycardiac but S1/S2 present, no obvious bruising noted in back, neg cullens and grey messina sign. Upon discussion with daughter who is the HCP, the daughter stated that her goal is to ensure a good quality of life rather than prolonging quantity. I discussed the risk and benefits of starting vasopressors and at this time the daughter stated she would not want to prolong suffering and wishes to transition patient to comfort care instead as patient has gone through RRTs prior and is presenting similarly again. Daughter stated she does not want to see her mom going through the same thing over and over again, and at this time would want to ensure patient has highest quality of life over prolong quantity. MOLST form signed at bedside and primary team witnessed and signed as well. Primary team to ensure palliative orders are placed(DC lab checks/vital checks/DC any meds not improving patient's quality of life/if possible PCU in AM). Blood work was discarded given updated GOC. Differential remain wide given patient's complex history from distributive/hemorrhagic/obstructive/cardiogenic shock but patient's goc has changed.    Primary team can consider the following orders for symptomatic control: Order IV dilaudid 1 mg q1 prn dyspnea/pain, IV ativan 0.5 mg q1 prn agitation/anxiety, glycopyrrolate 0.4 mg q6 prn secretions.

## 2023-09-01 NOTE — PROGRESS NOTE ADULT - ATTENDING COMMENTS
# Hypotonic Hyponatremia - ADH-mediated (Urine Osm high). Urine sodium 50, clinically euvolemic. 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     Sodium has not gone up.     Plan - increase salt tab to 2g from BID to TID.  Give furosemide 20mg po daily to help excrete electrolyte-free water.     The rest of the recommendations as per fellow's note.    Radha Simons MD  Attending Nephrologist  [phone] or via [employer] .
1. Hypotonic hyponatremia  Secondary to SIADH - triggered by pain.   Serum sodium now reached a plateau of 134.    Continue salt tabs 2g TID for now. No furosemide.         Discussed plans with primary team.      The rest of the recommendations as per fellow's note.    Radha Simons MD  Attending Nephrologist  [phone] or via [employer]

## 2023-09-01 NOTE — CHART NOTE - NSCHARTNOTEFT_GEN_A_CORE
-Cardiac enzymes noted to be uptrending     Tn 232-->655  CK 1905-->2352  MB 34.3-->122.8    EKG done revealed AFlutter with variable AV block @ 109  bpm with resolving ST depressions noted on EKG done during RRT yesterday    Pt seen at bedside, sleeping comfortably, in NAD    Vital Signs Last 24 Hrs  T(C): 36.6 (31 Aug 2023 20:54), Max: 37 (31 Aug 2023 08:22)  T(F): 97.8 (31 Aug 2023 20:54), Max: 98.6 (31 Aug 2023 08:22)  HR: 113 (31 Aug 2023 23:46) (78 - 117)  BP: 90/61 (31 Aug 2023 23:46) (76/56 - 121/72)  BP(mean): 74 (31 Aug 2023 16:28) (63 - 75)  RR: 18 (31 Aug 2023 23:46) (12 - 24)  SpO2: 97% (31 Aug 2023 23:46) (82% - 99%)    Parameters below as of 31 Aug 2023 23:46  Patient On (Oxygen Delivery Method): room air      < from: TTE W or WO Ultrasound Enhancing Agent (08.31.23 @ 17:30) >     CONCLUSIONS:      1. Normal left ventricular cavity size. Left ventricular systolic function is mildly to moderately decreased. There are regional wall motion abnormalities.   2. Entire apex, mid and apical anterior septum, basal and mid inferior septum, and basal anteroseptal segment are abnormal.   3. Compared to the transthoracic echocardiogram performed on 8/28/2023 wall motion abnormalities are new.      A/P:  88 year old female with past medical  A.fib and Mechanical AVR ([facility]) on coumadin, CAD, HTN/HLD, hypothyroidism, history of cholelithiasis/acute cholecystitis (managed conservatively 8/2022), who initially presented to  from home with few week history of worsening shortness of breath. At [place] found to have acute hypoxic respiratory failure due to acute on chronic diastolic heart failure due to severe MR/TR, NSTEMI type 2, persistent aflutter with RVR, and b/l pleural effusions - mild loculated R effusion + 1.4cm paratracheal LN. Tx to NS for structural heart eval and possible cardioversion    Yesterday, pt underwent pelvic angiography and embolization of a Left lumbar branch in IR.  While in recovery area, pt became hypotensive and hypoxic, RRT called, and pt. stabilized.  EKG post RRT revealed ST depressions in the inferlateral leads with elevated Tn 232 Ck 1905, and recommendation was to trend.  Tn level milena to 655 Ck 2352; on EKG ST depressions are resolving, and pt remains asymptomatic.  Pt with known recent NSTEMI type II, due to acute on chronic diastolic heart failure.   Pt now with -Cardiac enzymes noted to be uptrending     Tn 232-->655  CK 1905-->2352  MB 34.3-->122.8    EKG done revealed AFlutter with variable AV block @ 109  bpm with resolving ST depressions noted on EKG done during RRT yesterday    Pt seen at bedside, sleeping comfortably, in NAD    Vital Signs Last 24 Hrs  T(C): 36.6 (31 Aug 2023 20:54), Max: 37 (31 Aug 2023 08:22)  T(F): 97.8 (31 Aug 2023 20:54), Max: 98.6 (31 Aug 2023 08:22)  HR: 113 (31 Aug 2023 23:46) (78 - 117)  BP: 90/61 (31 Aug 2023 23:46) (76/56 - 121/72)  BP(mean): 74 (31 Aug 2023 16:28) (63 - 75)  RR: 18 (31 Aug 2023 23:46) (12 - 24)  SpO2: 97% (31 Aug 2023 23:46) (82% - 99%)    Parameters below as of 31 Aug 2023 23:46  Patient On (Oxygen Delivery Method): room air      < from: TTE W or WO Ultrasound Enhancing Agent (08.31.23 @ 17:30) >     CONCLUSIONS:      1. Normal left ventricular cavity size. Left ventricular systolic function is mildly to moderately decreased. There are regional wall motion abnormalities.   2. Entire apex, mid and apical anterior septum, basal and mid inferior septum, and basal anteroseptal segment are abnormal.   3. Compared to the transthoracic echocardiogram performed on 8/28/2023 wall motion abnormalities are new.      A/P:  88 year old female with past medical  A.fib and Mechanical AVR ([facility]) on coumadin, CAD, HTN/HLD, hypothyroidism, history of cholelithiasis/acute cholecystitis (managed conservatively 8/2022), who initially presented to  from home with few week history of worsening shortness of breath. At [place] found to have acute hypoxic respiratory failure due to acute on chronic diastolic heart failure due to severe MR/TR, NSTEMI type 2, persistent aflutter with RVR, and b/l pleural effusions - mild loculated R effusion + 1.4cm paratracheal LN. Tx to NS for structural heart eval and possible cardioversion    Yesterday, pt underwent pelvic angiography and embolization of a Left lumbar branch in IR.  While in recovery area, pt became hypotensive and hypoxic, RRT called, and pt. stabilized.  EKG post RRT revealed ST depressions in the inferolateral leads with elevated Tn 232 Ck 1905, and recommendation was to trend.  Tn level milena to 655 Ck 2352; on EKG ST depressions are resolving, and pt remains asymptomatic.  Pt with known recent NSTEMI type II, due to acute on chronic diastolic heart failure.   Up trending cardiac enzymes likely in setting of hypotensive episode post IR procedure.   As discussed with Cards Fellow Dr. Romero this AM, will continue to trend cardiac enzymes, and to cotinue monitoring pt for change in clinical status.    Will endorse to day team

## 2023-09-01 NOTE — PROGRESS NOTE ADULT - NSPROGADDITIONALINFOA_GEN_ALL_CORE
D/w ACP   D/w daughter at bedside
D/w ACP  D/w daughter Sandrita via phone
D/w ACP
D/w ACP  D/w daughter Sandrita at bedside
D/w ACP  D/w daughter at bedside
The necessity of the time spent during the encounter on this date of service was due to:   - Ordering, reviewing, and interpreting labs, testing, and imaging.  - Independently obtaining a review of systems and performing a physical exam  - Reviewing prior hospitalization and where necessary, outpatient records.  - Counselling and educating patient and family regarding interpretation of aforementioned items and plan of care.    Time-based billing (NON-critical care). Total minutes spent: 54

## 2023-09-01 NOTE — PROGRESS NOTE ADULT - PROBLEM SELECTOR PLAN 3
#Acute blood loss anemia   #RP bleed   Drop in Hgb while on heparin gtt. CTA AP:  left retroperitoneal hematoma with predominant intramuscular component within the iliacus muscle. left psoas intramuscular hematoma with an arterially enhancing focus, suggestive of active hemorrhage.   -S/p 1u pRBC  -S/p embolization with coil w/ IR 8/31. Per IR, ok to resume AC.   -Started back on heparin gtt overnight; closely monitor CBC. CBC q6h, coags

## 2023-09-01 NOTE — PROGRESS NOTE ADULT - NUTRITIONAL ASSESSMENT
This patient has been assessed with a concern for Malnutrition and has been determined to have a diagnosis/diagnoses of Moderate protein-calorie malnutrition.    This patient is being managed with:   Diet NPO-  Entered: Aug 31 2023  3:22AM    Diet NPO after Midnight-     NPO Start Date: 30-Aug-2023   NPO Start Time: 23:59  Entered: Aug 30 2023 11:35AM  
This patient has been assessed with a concern for Malnutrition and has been determined to have a diagnosis/diagnoses of Moderate protein-calorie malnutrition.    This patient is being managed with:   Diet NPO after Midnight-     NPO Start Date: 31-Aug-2023   NPO Start Time: 23:59  Entered: Aug 31 2023  2:29PM    Diet NPO-  Entered: Aug 31 2023  3:22AM    Diet NPO after Midnight-     NPO Start Date: 30-Aug-2023   NPO Start Time: 23:59  Entered: Aug 30 2023 11:35AM  
This patient has been assessed with a concern for Malnutrition and has been determined to have a diagnosis/diagnoses of Moderate protein-calorie malnutrition.    This patient is being managed with:   Diet NPO after Midnight-     NPO Start Date: 29-Aug-2023   NPO Start Time: 23:59  Entered: Aug 29 2023 11:38AM    Diet Regular-  Low Sodium  Supplement Feeding Modality:  Oral  Ensure Plus High Protein Cans or Servings Per Day:  1       Frequency:  Daily  Entered: Aug 28 2023  5:07PM  
This patient has been assessed with a concern for Malnutrition and has been determined to have a diagnosis/diagnoses of Moderate protein-calorie malnutrition.    This patient is being managed with:   Diet NPO after Midnight-     NPO Start Date: 30-Aug-2023   NPO Start Time: 23:59  Entered: Aug 30 2023 11:35AM    Diet Regular-  Low Sodium  Supplement Feeding Modality:  Oral  Ensure Plus High Protein Cans or Servings Per Day:  1       Frequency:  Daily  Entered: Aug 28 2023  5:07PM  
This patient has been assessed with a concern for Malnutrition and has been determined to have a diagnosis/diagnoses of Moderate protein-calorie malnutrition.    This patient is being managed with:   Diet Regular-  Supplement Feeding Modality:  Oral  Ensure Plus High Protein Cans or Servings Per Day:  1       Frequency:  Daily  Entered: Sep  1 2023  9:05AM

## 2023-09-01 NOTE — PROGRESS NOTE ADULT - PROBLEM SELECTOR PLAN 4
c/w ASA, lipitor, bb
-CT showed mildly loculated moderate right pleural effusion, small layering left pleural effusion, atelectasis/consolidation of right middle lobe, basilar right lower lobe and anterobasilar left lower lobe; mildly enlarged R lower paratracheal lymph node 1.4cm   Seen by pulm at  -   -Repeat CXR 8/21 with improvement  Per  chart - pulm d/w daughter and patient on 8/23 that b.l pl effusions likely cardiogenic in origin and that her breathing now better with diuretics suggestive of cardiogenic in origin, Discussed having small loculation on CT is a non specific finding in a patient who had open heart surgery, however with having enlarged lymph node and partial loculated pleural effusion together on same side can be suggestive of underlying process's like cancer being present. Probability is small-moderate risk and would need follow up.  Discussed 2 paths forward thoracentesis while at  but will need to be off a/c vs repeat imaging in few weeks  Now transferred to [facility] - evaluated by pulm for possible thoracentesis. Per pulm: Bedside ultrasound showed minimal effusion present that would not be amenable to thoracentesis.  Patient will need follow-up Chest CT 6 weeks from the most recent.
-CT showed mildly loculated moderate right pleural effusion, small layering left pleural effusion, atelectasis/consolidation of right middle lobe, basilar right lower lobe and anterobasilar left lower lobe; mildly enlarged R lower paratracheal lymph node 1.4cm   Seen by pulm at  -   -Repeat CXR 8/21 with improvement  Per  chart - pulm d/w daughter and patient on 8/23 that b.l pl effusions likely cardiogenic in origin and that her breathing now better with diuretics suggestive of cardiogenic in origin, Discussed having small loculation on CT is a non specific finding in a patient who had open heart surgery, however with having enlarged lymph node and partial loculated pleural effusion together on same side can be suggestive of underlying process's like cancer being present. Probability is small-moderate risk and would need follow up.  Discussed 2 paths forward thoracentesis while at  but will need to be off a/c vs repeat imaging in few weeks  Now transferred to [facility] - evaluated by pulm for possible thoracentesis. Per pulm: Bedside ultrasound showed minimal effusion present that would not be amenable to thoracentesis.  Patient will need follow-up Chest CT 6 weeks from the most recent.

## 2023-09-01 NOTE — CONSULT NOTE ADULT - SUBJECTIVE AND OBJECTIVE BOX
Cardiology Consult Note   [Please check [email or web address] password: [email or web address]" for cardiology service schedule and contact information]    Cardiology consult service requested per family and primary team to assist in management of her afib.     History of Present Illness:   Cardiology consult   88 year old female with  Mhx of afib on couamdin,  CAD, HTN/HLD, hypothyroidism, history of cholelithiasis/acute cholecystitis (managed conservatively 8/2022), who initially presents from home with few weeks history of worsening shortness of breath. At [facility] found to have acute decompensated heart failure due to severe MR/TR, transferred for further evaluation. Her course was complicated by afib with RVR, found to have RP bleed s/p IR embolization and was hypotensive s/p PRBC and had transient elevations in HStrops. At present time, patient denies shortness of breath, chest pain, light headedness or dizziness. SHe occasionally will fell some palpitations.     Of note, being evaluated by structural cardiology.     PAST MEDICAL & SURGICAL HISTORY:  S/P AVR      Atrial fibrillation      Hypothyroidism      History of appendectomy      S/P AVR        FAMILY HISTORY:    SOCIAL HISTORY:  unchanged    MEDICATIONS:  heparin  Infusion 600 Unit(s)/Hr IV Continuous <Continuous>  metoprolol tartrate 25 milliGRAM(s) Oral two times a day    levoFLOXacin IVPB          acetaminophen     Tablet .. 650 milliGRAM(s) Oral every 6 hours  cyclobenzaprine 5 milliGRAM(s) Oral three times a day PRN  melatonin 3 milliGRAM(s) Oral at bedtime PRN  morphine  - Injectable 2 milliGRAM(s) IV Push every 6 hours PRN    polyethylene glycol 3350 17 Gram(s) Oral daily  senna 2 Tablet(s) Oral at bedtime    levothyroxine 88 MICROGram(s) Oral daily    lidocaine   4% Patch 1 Patch Transdermal daily  multivitamin 1 Tablet(s) Oral daily  sodium chloride 2 Gram(s) Oral three times a day      REVIEW OF SYSTEMS:  CV: chest pain (-), palpitation (-), orthopnea (-), PND (-), edema (-)  PULM: SOB (-), cough (-), wheezing (-), hemoptysis (-).   CONST: fever (-), chills (-) or fatigue (-)  GI: abdominal distension (-), abdominal pain (-) , nausea/vomiting (-), hematemesis, (-), melena (-), hematochezia (-)  : dysuria (-), frequency (-), hematuria (-).   NEURO: numbness (-), weakness (-), dizziness (-)  SKIN: itching (-), rash (-)  HEENT:  visual changes (-); vertigo or throat pain (-);  neck stiffness (-)     All other review of systems is negative unless indicated above.   -------------------------------------------------------------------------------------------  PHYSICAL EXAM:  T(C): 36.3 (09-01-23 @ 12:15), Max: 36.9 (08-31-23 @ 17:54)  HR: 119 (09-01-23 @ 12:15) (85 - 134)  BP: 99/61 (09-01-23 @ 12:15) (76/56 - 121/72)  RR: 18 (09-01-23 @ 12:15) (12 - 24)  SpO2: 98% (09-01-23 @ 12:15) (82% - 99%)  Wt(kg): --  I&O's Summary    31 Aug 2023 07:01  -  01 Sep 2023 07:00  --------------------------------------------------------  IN: 0 mL / OUT: 1000 mL / NET: -1000 mL    01 Sep 2023 07:01  -  01 Sep 2023 14:25  --------------------------------------------------------  IN: 600 mL / OUT: 0 mL / NET: 600 mL        General: No acute distress. Awake and conversant.   Eyes: Normal conjunctiva, anicteric. Round symmetric pupils.   ENT: Hearing grossly intact. No nasal discharge.   Neck: Neck is supple. No masses or thyromegaly.   Respiratory: Respirations are non-labored. No wheezing.   Skin: Warm. No rashes or ulcers.   Psych: Alert and oriented. Cooperative, Appropriate mood and affect, Normal judgment.   CV: No lower extremity edema.   MSK: Normal ambulation. No clubbing or cyanosis.   Neuro: Sensation and CN II-XII grossly normal.      -------------------------------------------------------------------------------------------  LABS:                          10.3   14.51 )-----------( 344      ( 01 Sep 2023 12:33 )             31.3     09-01    134<L>  |  97  |  24<H>  ----------------------------<  114<H>  4.3   |  21<L>  |  1.12    Ca    9.1      01 Sep 2023 06:33  Mg     1.9     08-30    TPro  6.7  /  Alb  3.0<L>  /  TBili  1.3<H>  /  DBili  x   /  AST  193<H>  /  ALT  39  /  AlkPhos  66  09-01    PT/INR - ( 31 Aug 2023 16:40 )   PT: 16.8 sec;   INR: 1.55 ratio         PTT - ( 01 Sep 2023 06:33 )  PTT:36.5 sec  CARDIAC MARKERS ( 01 Sep 2023 06:33 )  646 ng/L / x     / x     / 2476 U/L / x     / 126.7 ng/mL  CARDIAC MARKERS ( 01 Sep 2023 00:10 )  655 ng/L / x     / x     / 2352 U/L / x     / 122.8 ng/mL  CARDIAC MARKERS ( 31 Aug 2023 16:40 )  232 ng/L / x     / x     / 1905 U/L / x     / 34.3 ng/mL  CARDIAC MARKERS ( 29 Aug 2023 03:10 )  x     / x     / x     / 1269 U/L / x     / x          levoFLOXacin IVPB          acetaminophen     Tablet .. 650 milliGRAM(s) Oral every 6 hours  cyclobenzaprine 5 milliGRAM(s) Oral three times a day PRN  melatonin 3 milliGRAM(s) Oral at bedtime PRN  morphine  - Injectable 2 milliGRAM(s) IV Push every 6 hours PRN    -------------------------------------------------------------------------------------------  Meds:  polyethylene glycol 3350 17 Gram(s) Oral daily  senna 2 Tablet(s) Oral at bedtime    -------------------------------------------------------------------------------------------  Cardiovascular Diagnostic Testing:    ECG: afib and aflutter with variable block     Echo:   Referring Physician:    [id number] Jyotsna Melgoza  Interpreting Physician: Ev Raymond  Primary Sonographer:    Johnny Becerra [facility]    CPT:                ECHO TTE WITH CON COMP W DOPP - .m;DEFINITY ECHO                      CONTRAST PER ML - .m;DEFINITY ECHO CONTRAST PER ML                      WASTED - .m  Indication(s):      Heart failure, unspecified - I50.9  Procedure:          Transthoracic echocardiogram with 2-D, M-mode and complete                      spectral and color flow Doppler.  Ordering Location:  [facility]  Contrast Injection: Verbal consentwas obtained for injection of Ultrasonic                      Enhancing Agent following a discussion of risks and                      benefits.                      Endocardial visualization enhanced with 3 ml of Definity                      Ultrasound enhancing agent (Lot#:[id number] Exp.Date:09/01/2024                      Discarded Dose:7ml).  UEA Reaction:       Patient had no adverse reaction after injection of                      Ultrasound Enhancing Agent.  Study Information:  Image quality for this study is technically difficult.    _______________________________________________________________________________________     CONCLUSIONS:      1. Technically difficult image quality.   2. Left ventricular systolic function is normal with an ejection fraction of 58 % by Rae's method of disks.   3. There is normal LV mass and concentric remodeling.   4. Normal right ventricular systolic function.   5. There is calcification of the mitral valve annulus.   6. Moderate mitral regurgitation.   7. There is mild mitral valve stenosis.   8. Moderate tricuspid regurgitation.   9. No prior echocardiogram is available for comparison.    ________________________________________________________________________________________  FINDINGS:     Left Ventricle:  Left ventricular systolic function is normal with a calculated ejection fraction of 58 % by the Rae's biplane method of disks. There is normal LV mass and concentric remodeling.     Right Ventricle:  The right ventricle is not well visualized. Normal right ventricular systolic function. Tricuspid annular plane systolic excursion (TAPSE) is 1.0 cm (normal >=1.7 cm). Tricuspid annular tissue Doppler S' is 7.5 cm/s (normal >10 cm/s).     Left Atrium:  The left atrium is moderately dilated in size with an indexed volume of 55.19 ml/m².     Right Atrium:  The right atrium is normal in size with an indexed volume of 26.84 ml/m².     Aortic Valve:  A mechanical valve replacement present in the aortic position. The prosthetic valve is well seated with normal function. There is trace intravalvular regurgitation. There is moderate calcification of the aortic valve leaflets. There is moderate aortic stenosis.     Mitral Valve:  There is calcification of the mitral valve annulus. There is severe leaflet calcification. There is mild mitral valve stenosis. There is moderate mitral regurgitation.     Tricuspid Valve:  There is moderate tricuspid regurgitation. Estimated pulmonary artery systolic pressure is 26 mmHg, consistent withnormal pulmonary artery pressure.     Pulmonic Valve:  Structurally normal pulmonic valve with normal leaflet excursion. There is mild pulmonic regurgitation.     Systemic Veins:  The inferior vena cava is normal in size measuring 0.60 cm in diameter, (normal <2.1cm) with abnormal inspiratory collapse (abnormal <50%) consistent with mildly elevated right atrial pressure (~8, range 5-10mmHg).  ____________________________________________________________________  Quantitative Data:  Left Ventricle Measurements: (Indexed to BSA)     IVSd (2D):   0.8 cm  LVPWd (2D):  0.8 cm  LVIDd (2D):  3.9 cm  LVIDs (2D):  2.7 cm  LV Mass:     94 g   58.6 g/m²  BiPlane LV EF%: 58 %     MV E Vmax:    1.46 m/s  e' lateral:   9.79 cm/s  e' medial:    6.74 cm/s  E/e' lateral: 14.91  E/e' medial:  21.66  E/e' Average: 17.66    Aorta Measurements: (normal range) (Indexed to BSA)     Sinuses of Valsalva: 3.00 cm (2.7 - 3.3 cm)cm       Left Atrium Measurements: (Indexed to BSA)  LA Diam 2D: 6.10 cm    Right Ventricle Measurements: Right Atrial Measurements:     TAPSE:           1.0 cm       RA Vol:       43.00 ml  TV Gabby. S':      7.51 cm/s    RA Vol Index: 26.84 ml/m²  RV Base (RVID1): 3.0 cm  RV Mid (RVID2):  2.0 cm       LVOT / RVOT/ Qp/Qs Data: (Indexed to BSA)  LVOT Diameter: 2.10 cm  LVOT Vmax:     0.65 m/s  LVOT VTI:      14.20 cm  LVOT SV:       49.2 ml  30.70 ml/m²    Aortic Valve Measurements:  AV Vmax:           2.1 m/s  AV Peak Gradient:  18.3 mmHg  AV Mean Gradient:  10.0 mmHg  AV VTI:  37.6 cm  AV VTI Ratio:      0.38  AoV EOA, Contin:   1.31 cm²  AoV EOA, Contin i: 0.82 cm²/m²    Mitral Valve Measurements:     MV Vmax:      1.43 m/s     MR Vmax:          5.52 m/s  MV Mean Grad: 3.00 mmHg    MR VTI:           160.00 cm  MV Peak Grad: 8.2 mmHg     MR Mean Gradient: 83.0 mmHg  MV E Vmax:    1.5 m/s      MR Peak Gradient: 121.9 mmHg          Tricuspid Valve Measurements:     TR Vmax:          2.1 m/s  TR Peak Gradient: 17.6 mmHg  RA Pressure:      8 mmHg  PASP:             26 mmHg    ________________________________________________________________________________________        -------------------------------------------------------------------------------------------  Assessment and Plan:   1.afib  2.RP Bleed    Recs  BP stable, patient not symptomatic from the afib at this time  -ChadsVasc =6, AC recommended to reduce risk of stroke, Risk Vs benefit per primary team, on heparin gtt.   -continue to address underlying causes of tachycardia including acute blood loss anemia from RP bleed  -Goal HR <110 if asymptomatic and <80 if symptomatic   -c/w metoprolol 25mg PO BID  -can try digoxin if HR uncontrolled and a/w hypotension  -Would try to avoid chemical or electrocardioversion until patient proves stable on anticoagulation and able to get FAREED.     Sheldon Celestin, Cardiology Fellow, F-2    For all New Consults and Questions:  [email or web address]   Login: [email or web address]    *** Note not final until signed by attending Cardiology Consult Note   [Please check [email or web address] password: [email or web address]" for cardiology service schedule and contact information]    Cardiology consult service requested per family and primary team to assist in management of her afib.     History of Present Illness:   Cardiology consult   88 year old female with  Mhx of afib on couamdin,  CAD, HTN/HLD, hypothyroidism, history of cholelithiasis/acute cholecystitis (managed conservatively 8/2022), who initially presents from home with few weeks history of worsening shortness of breath. At [facility] found to have acute decompensated heart failure due to severe MR/TR, transferred for further evaluation. Her course was complicated by afib with RVR, found to have RP bleed s/p IR embolization and was hypotensive s/p PRBC and had transient elevations in HStrops. At present time, patient denies shortness of breath, chest pain, light headedness or dizziness. SHe occasionally will fell some palpitations.     Of note, being evaluated by structural cardiology.     PAST MEDICAL & SURGICAL HISTORY:  S/P AVR      Atrial fibrillation      Hypothyroidism      History of appendectomy      S/P AVR        FAMILY HISTORY:    SOCIAL HISTORY:  unchanged    MEDICATIONS:  heparin  Infusion 600 Unit(s)/Hr IV Continuous <Continuous>  metoprolol tartrate 25 milliGRAM(s) Oral two times a day    levoFLOXacin IVPB          acetaminophen     Tablet .. 650 milliGRAM(s) Oral every 6 hours  cyclobenzaprine 5 milliGRAM(s) Oral three times a day PRN  melatonin 3 milliGRAM(s) Oral at bedtime PRN  morphine  - Injectable 2 milliGRAM(s) IV Push every 6 hours PRN    polyethylene glycol 3350 17 Gram(s) Oral daily  senna 2 Tablet(s) Oral at bedtime    levothyroxine 88 MICROGram(s) Oral daily    lidocaine   4% Patch 1 Patch Transdermal daily  multivitamin 1 Tablet(s) Oral daily  sodium chloride 2 Gram(s) Oral three times a day      REVIEW OF SYSTEMS:  CV: chest pain (-), palpitation (-), orthopnea (-), PND (-), edema (-)  PULM: SOB (-), cough (-), wheezing (-), hemoptysis (-).   CONST: fever (-), chills (-) or fatigue (-)  GI: abdominal distension (-), abdominal pain (-) , nausea/vomiting (-), hematemesis, (-), melena (-), hematochezia (-)  : dysuria (-), frequency (-), hematuria (-).   NEURO: numbness (-), weakness (-), dizziness (-)  SKIN: itching (-), rash (-)  HEENT:  visual changes (-); vertigo or throat pain (-);  neck stiffness (-)     All other review of systems is negative unless indicated above.   -------------------------------------------------------------------------------------------  PHYSICAL EXAM:  T(C): 36.3 (09-01-23 @ 12:15), Max: 36.9 (08-31-23 @ 17:54)  HR: 119 (09-01-23 @ 12:15) (85 - 134)  BP: 99/61 (09-01-23 @ 12:15) (76/56 - 121/72)  RR: 18 (09-01-23 @ 12:15) (12 - 24)  SpO2: 98% (09-01-23 @ 12:15) (82% - 99%)  Wt(kg): --  I&O's Summary    31 Aug 2023 07:01  -  01 Sep 2023 07:00  --------------------------------------------------------  IN: 0 mL / OUT: 1000 mL / NET: -1000 mL    01 Sep 2023 07:01  -  01 Sep 2023 14:25  --------------------------------------------------------  IN: 600 mL / OUT: 0 mL / NET: 600 mL        General: No acute distress. Awake and conversant.   Eyes: Normal conjunctiva, anicteric. Round symmetric pupils.   ENT: Hearing grossly intact. No nasal discharge.   Neck: Neck is supple. No masses or thyromegaly.   Respiratory: Respirations are non-labored. No wheezing.   Skin: Warm. No rashes or ulcers.   Psych: Alert and oriented. Cooperative, Appropriate mood and affect, Normal judgment.   CV: No lower extremity edema.   MSK: Normal ambulation. No clubbing or cyanosis.   Neuro: Sensation and CN II-XII grossly normal.      -------------------------------------------------------------------------------------------  LABS:                          10.3   14.51 )-----------( 344      ( 01 Sep 2023 12:33 )             31.3     09-01    134<L>  |  97  |  24<H>  ----------------------------<  114<H>  4.3   |  21<L>  |  1.12    Ca    9.1      01 Sep 2023 06:33  Mg     1.9     08-30    TPro  6.7  /  Alb  3.0<L>  /  TBili  1.3<H>  /  DBili  x   /  AST  193<H>  /  ALT  39  /  AlkPhos  66  09-01    PT/INR - ( 31 Aug 2023 16:40 )   PT: 16.8 sec;   INR: 1.55 ratio         PTT - ( 01 Sep 2023 06:33 )  PTT:36.5 sec  CARDIAC MARKERS ( 01 Sep 2023 06:33 )  646 ng/L / x     / x     / 2476 U/L / x     / 126.7 ng/mL  CARDIAC MARKERS ( 01 Sep 2023 00:10 )  655 ng/L / x     / x     / 2352 U/L / x     / 122.8 ng/mL  CARDIAC MARKERS ( 31 Aug 2023 16:40 )  232 ng/L / x     / x     / 1905 U/L / x     / 34.3 ng/mL  CARDIAC MARKERS ( 29 Aug 2023 03:10 )  x     / x     / x     / 1269 U/L / x     / x          levoFLOXacin IVPB          acetaminophen     Tablet .. 650 milliGRAM(s) Oral every 6 hours  cyclobenzaprine 5 milliGRAM(s) Oral three times a day PRN  melatonin 3 milliGRAM(s) Oral at bedtime PRN  morphine  - Injectable 2 milliGRAM(s) IV Push every 6 hours PRN    -------------------------------------------------------------------------------------------  Meds:  polyethylene glycol 3350 17 Gram(s) Oral daily  senna 2 Tablet(s) Oral at bedtime    -------------------------------------------------------------------------------------------  Cardiovascular Diagnostic Testing:    ECG: afib and aflutter with variable block     Echo:   Referring Physician:    [id number] Jyotsna Melgoza  Interpreting Physician: Ev Raymond  Primary Sonographer:    Johnny Becerra [facility]    CPT:                ECHO TTE WITH CON COMP W DOPP - .m;DEFINITY ECHO                      CONTRAST PER ML - .m;DEFINITY ECHO CONTRAST PER ML                      WASTED - .m  Indication(s):      Heart failure, unspecified - I50.9  Procedure:          Transthoracic echocardiogram with 2-D, M-mode and complete                      spectral and color flow Doppler.  Ordering Location:  [facility]  Contrast Injection: Verbal consentwas obtained for injection of Ultrasonic                      Enhancing Agent following a discussion of risks and                      benefits.                      Endocardial visualization enhanced with 3 ml of Definity                      Ultrasound enhancing agent (Lot#:[id number] Exp.Date:09/01/2024                      Discarded Dose:7ml).  UEA Reaction:       Patient had no adverse reaction after injection of                      Ultrasound Enhancing Agent.  Study Information:  Image quality for this study is technically difficult.    _______________________________________________________________________________________     CONCLUSIONS:      1. Technically difficult image quality.   2. Left ventricular systolic function is normal with an ejection fraction of 58 % by Rae's method of disks.   3. There is normal LV mass and concentric remodeling.   4. Normal right ventricular systolic function.   5. There is calcification of the mitral valve annulus.   6. Moderate mitral regurgitation.   7. There is mild mitral valve stenosis.   8. Moderate tricuspid regurgitation.   9. No prior echocardiogram is available for comparison.    ________________________________________________________________________________________  FINDINGS:     Left Ventricle:  Left ventricular systolic function is normal with a calculated ejection fraction of 58 % by the Rae's biplane method of disks. There is normal LV mass and concentric remodeling.     Right Ventricle:  The right ventricle is not well visualized. Normal right ventricular systolic function. Tricuspid annular plane systolic excursion (TAPSE) is 1.0 cm (normal >=1.7 cm). Tricuspid annular tissue Doppler S' is 7.5 cm/s (normal >10 cm/s).     Left Atrium:  The left atrium is moderately dilated in size with an indexed volume of 55.19 ml/m².     Right Atrium:  The right atrium is normal in size with an indexed volume of 26.84 ml/m².     Aortic Valve:  A mechanical valve replacement present in the aortic position. The prosthetic valve is well seated with normal function. There is trace intravalvular regurgitation. There is moderate calcification of the aortic valve leaflets. There is moderate aortic stenosis.     Mitral Valve:  There is calcification of the mitral valve annulus. There is severe leaflet calcification. There is mild mitral valve stenosis. There is moderate mitral regurgitation.     Tricuspid Valve:  There is moderate tricuspid regurgitation. Estimated pulmonary artery systolic pressure is 26 mmHg, consistent withnormal pulmonary artery pressure.     Pulmonic Valve:  Structurally normal pulmonic valve with normal leaflet excursion. There is mild pulmonic regurgitation.     Systemic Veins:  The inferior vena cava is normal in size measuring 0.60 cm in diameter, (normal <2.1cm) with abnormal inspiratory collapse (abnormal <50%) consistent with mildly elevated right atrial pressure (~8, range 5-10mmHg).  ____________________________________________________________________  Quantitative Data:  Left Ventricle Measurements: (Indexed to BSA)     IVSd (2D):   0.8 cm  LVPWd (2D):  0.8 cm  LVIDd (2D):  3.9 cm  LVIDs (2D):  2.7 cm  LV Mass:     94 g   58.6 g/m²  BiPlane LV EF%: 58 %     MV E Vmax:    1.46 m/s  e' lateral:   9.79 cm/s  e' medial:    6.74 cm/s  E/e' lateral: 14.91  E/e' medial:  21.66  E/e' Average: 17.66    Aorta Measurements: (normal range) (Indexed to BSA)     Sinuses of Valsalva: 3.00 cm (2.7 - 3.3 cm)cm       Left Atrium Measurements: (Indexed to BSA)  LA Diam 2D: 6.10 cm    Right Ventricle Measurements: Right Atrial Measurements:     TAPSE:           1.0 cm       RA Vol:       43.00 ml  TV Gabby. S':      7.51 cm/s    RA Vol Index: 26.84 ml/m²  RV Base (RVID1): 3.0 cm  RV Mid (RVID2):  2.0 cm       LVOT / RVOT/ Qp/Qs Data: (Indexed to BSA)  LVOT Diameter: 2.10 cm  LVOT Vmax:     0.65 m/s  LVOT VTI:      14.20 cm  LVOT SV:       49.2 ml  30.70 ml/m²    Aortic Valve Measurements:  AV Vmax:           2.1 m/s  AV Peak Gradient:  18.3 mmHg  AV Mean Gradient:  10.0 mmHg  AV VTI:  37.6 cm  AV VTI Ratio:      0.38  AoV EOA, Contin:   1.31 cm²  AoV EOA, Contin i: 0.82 cm²/m²    Mitral Valve Measurements:     MV Vmax:      1.43 m/s     MR Vmax:          5.52 m/s  MV Mean Grad: 3.00 mmHg    MR VTI:           160.00 cm  MV Peak Grad: 8.2 mmHg     MR Mean Gradient: 83.0 mmHg  MV E Vmax:    1.5 m/s      MR Peak Gradient: 121.9 mmHg          Tricuspid Valve Measurements:     TR Vmax:          2.1 m/s  TR Peak Gradient: 17.6 mmHg  RA Pressure:      8 mmHg  PASP:             26 mmHg    ________________________________________________________________________________________        -------------------------------------------------------------------------------------------  Assessment and Plan:   1.afib  2.RP Bleed    Recs  BP stable, patient not symptomatic from the afib at this time  -ChadsVasc =6, AC recommended to reduce risk of stroke, Risk Vs benefit per primary team, on heparin gtt.   -continue to address underlying causes of tachycardia including acute blood loss anemia from RP bleed and infection   -Goal HR <110 if asymptomatic and <80 if symptomatic   -c/w metoprolol 25mg PO BID  -can try digoxin if HR uncontrolled and a/w hypotension  -Would try to avoid chemical or electrocardioversion until patient proves stable on anticoagulation and able to get FAREED.     Sheldon Celestin, Cardiology Fellow, F-2    For all New Consults and Questions:  [email or web address]   Login: [email or web address]    *** Note not final until signed by attending

## 2023-09-01 NOTE — PROGRESS NOTE ADULT - PROBLEM SELECTOR PROBLEM 5
Hypothyroidism
Hypothyroidism
CAD (coronary artery disease)
CAD (coronary artery disease)
Hypothyroidism
Hypothyroidism

## 2023-09-01 NOTE — PROGRESS NOTE ADULT - PROBLEM SELECTOR PLAN 10
PRN meclizine
Hypotonic Hyponatremia ADH mediated likely 2/2 pain (U Na 50, Uosm 554).   -Renal following  -Salt tabs 2gm TID   -Monitor BMP
PRN meclizine
c/w ensure

## 2023-09-01 NOTE — CONSULT NOTE ADULT - CONSULT REQUESTED DATE/TIME
01-Sep-2023 14:25
29-Aug-2023 10:28
30-Aug-2023 17:11
28-Aug-2023 11:11
28-Aug-2023 14:30
27-Aug-2023 12:32
27-Aug-2023 14:42
31-Aug-2023
01-Sep-2023

## 2023-09-01 NOTE — PROGRESS NOTE ADULT - PROBLEM SELECTOR PROBLEM 1
Hyponatremia
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

## 2023-09-01 NOTE — PROGRESS NOTE ADULT - PROBLEM SELECTOR PROBLEM 3
Anemia
Loculated pleural effusion
Anemia
Loculated pleural effusion

## 2023-09-01 NOTE — PROGRESS NOTE ADULT - PROBLEM SELECTOR PLAN 6
Elevated Bilirubin with likely component of congestive hepatopathy  History of Acute Cholecystitis  -RUQ u/s showed cholelithiasis without evidence of cholecystitis or biliary obstruction, patient is asymptomatic  -Elevated total bili appears chronic, improved with diuresis  -Monitor for now
Elevated Bilirubin with likely component of congestive hepatopathy  History of Acute Cholecystitis  -RUQ u/s showed cholelithiasis without evidence of cholecystitis or biliary obstruction, patient is asymptomatic  -Elevated total bili appears chronic, improved with diuresis  -Monitor for now
TSH elevated at [facility] however in setting of acute illness  c/w synthroid 88 and will need repeat TSH as outpatient
Elevated Bilirubin with likely component of congestive hepatopathy  History of Acute Cholecystitis  -RUQ u/s showed cholelithiasis without evidence of cholecystitis or biliary obstruction, patient is asymptomatic  -Elevated total bili appears chronic, improved with diuresis  -Monitor for now
Elevated Bilirubin with likely component of congestive hepatopathy  History of Acute Cholecystitis  -RUQ u/s showed cholelithiasis without evidence of cholecystitis or biliary obstruction, patient is asymptomatic  -Elevated total bili appears chronic, improved with diuresis  -Monitor for now
TSH elevated at [facility] however in setting of acute illness  c/w synthroid 88 and will need repeat TSH as outpatient

## 2023-09-01 NOTE — PROGRESS NOTE ADULT - CONVERSATION DETAILS
MOLST filled out yesterday with RRT team. Confirmed DNR/DNI status with patient and daughter at bedside.

## 2023-09-01 NOTE — CONSULT NOTE ADULT - PROBLEM SELECTOR RECOMMENDATION 9
We were asked to evaluate the patient for possible Mitral BITA for her Severe MR. She was transferred here from [facility]. She also has severe TR on her TTE. I will review the images with Dr. Garcia, and the rest of the Structural Heart Team. She will need a Cardiac Catheterization also as part of her workup. Once all tesating is complete, we will review to see if she is a candidate for Mitral BITA, and timing. She also has Severe TR, which would be re-evaluated after her Mitral Intervention.
Worsening hyponatremia (on admission 134; now 126 within 4 days) possibly 2' increased ADH 2' left hip pain. Not currently on diuretics, no recent volume depletion. Currently seems euvolemic on physical exam.     SOsm 278.  Urine Osm 554.     Please obtain urine Na. Monitor Na. As currently pt is asymptomatic with moderate hyponatremia, will wait for urine Na before any management.     Thank you for this consult.  Sage Ashby  Nephrology Fellow  Please contact me on TEAMS  After 5 pm please contact the on-call Fellow.
Severe MR and TR noted on echo, the plan was for FAREED to consider if pt is a candidate for procedural intervention, however pt with hyponatremia and bleeding/anemia as barriers to this  - Case discussed with structural heart team, pt may not be a candidate for procedural intervention given calcifications seen on TTE, and now given complicated hospital course, no plans for further inpatient work up at this time. Pt should recuperate/transition to rehab before any consideration of next steps  - Without definitive intervention, pt is at risk for further decline/medical complications in the near future, which was outlined by the team as well as myself

## 2023-09-01 NOTE — PROGRESS NOTE ADULT - PROBLEM SELECTOR PLAN 2
Currently in afib   Monitor on tele  on heparin gtt - was therapeutic on coumadin but now holding for potential intervention by struc heart  c/w dig and metoprolol 25mg - can increase dose  Cards eval pending - consider EP for ablation if aflutter    Mechanical AVR - c/w heparin
Acute drop in Hb due to retroperitoneal bleed. Heparin discontinued and patient received blood transfusion.     Getting IR procedure for embolization.            Radha Simons MD  Attending Nephrologist  [phone] or via [employer]
Currently in afib   Monitor on tele  on heparin gtt - was therapeutic on coumadin but now holding for potential intervention by struc heart  c/w dig and metoprolol 25mg   Cardiology following     Mechanical AVR - c/w heparin
Retroperitoneal hematoma, s/p embolization. Transfuse PRN.       Thank you for this consult.  Sage Ashby  Nephrology Fellow  Please contact me on TEAMS  After 5 pm please contact the on-call Fellow.
Currently in afib   Monitor on tele  on heparin gtt - was therapeutic on coumadin but now holding for potential intervention by struc heart  c/w dig and metoprolol 25mg   Cardiology following     Mechanical AVR - c/w heparin
Currently in afib   Monitor on tele  on heparin gtt - was therapeutic on coumadin but now holding for potential intervention by struc heart  c/w dig and metoprolol 25mg - can increase dose  Cards eval pending - consider EP for ablation if aflutter    Mechanical AVR - c/w heparin
Currently in afib   Monitor on tele  on heparin gtt  c/w metoprolol 25mg BID  HR poorly controlled - f/u cards recs     Mechanical AVR - heparin gtt
Currently in afib   Monitor on tele  on heparin gtt - was therapeutic on coumadin but now holding for potential intervention by struc heart  c/w dig and metoprolol 25mg   Cardiology following     Mechanical AVR - holding hep gtt due to RP bleed

## 2023-09-01 NOTE — PROVIDER CONTACT NOTE (CHANGE IN STATUS NOTIFICATION) - ASSESSMENT
pt a&o x3, denies cp or sob. Pt lethargic, states feeling "awful". Hypotensive on assessment RRT called. (See rapid note) Pt made comfort measures during rapid.
BP 92/59, , RR 21, 02 Sat 89% on 6 L n/c. Complains of new onset chest pain and shortness of breath.

## 2023-09-01 NOTE — CHART NOTE - NSCHARTNOTEFT_GEN_A_CORE
Pt c/o SOB. Pt reports nausea and feeling short of breath. When seen at bedside, pt is resting comfortably and not tachypneic.   SPO2 98% on 6L. Recent Hg from 17:30 stable at 10.2. Tachy to 130s.   Repeat EKG revealing afib w/ rvr.   Upon chart review, pt missed last 3 doses of PO lopressor 25mg.     On exam:  Chest: Vesicular breath sounds b/l, no rales or rhonchi  Cardiac: irregular rate and fast rhythm, no murmur     Vital Signs Last 24 Hrs  T(C): 36.2 (01 Sep 2023 16:47), Max: 36.6 (31 Aug 2023 20:54)  T(F): 97.2 (01 Sep 2023 16:47), Max: 97.8 (31 Aug 2023 20:54)  HR: 130 (01 Sep 2023 16:47) (107 - 134)  BP: 104/62 (01 Sep 2023 16:47) (90/61 - 104/62)  BP(mean): --  RR: 18 (01 Sep 2023 16:47) (18 - 18)  SpO2: 98% (01 Sep 2023 16:47) (93% - 99%)    Parameters below as of 01 Sep 2023 16:47  Patient On (Oxygen Delivery Method): nasal cannula  O2 Flow (L/min): 6    PLAN: d/w Dr. Lira  - Vianney prn for nausea  - Monitor SPO2   - STAT dose of PO lopressor 50mg   - Increase standing lopressor dose to 50mg BID

## 2023-09-01 NOTE — CONSULT NOTE ADULT - ASSESSMENT
88F with Afib and Mechanical AVR ([facility] [facility]) on coumadin, CAD, HTN/HLD, hypothyroidism who initially presents from home with few week history of worsening shortness of breath. At [place] found to have acute on chronic diastolic heart failure due to severe MR/TR, NSTEMI type 2, managed with IV lasix though limited by hypotension. Additionally, patient with bilat pleural effusions with 1.4cm paratracheal LN with plans for f/u outpatient imaging in 6 weeks. Pt was transferred to [facility] for structural heart evaluation. Hospital course c/b RP bleed s/p IR embolization. Geriatrics and Palliative Medicine Team is consulted at family request for support and guidance on GOC.

## 2023-09-01 NOTE — CONSULT NOTE ADULT - REASON FOR ADMISSION
Tx for structural heart eval

## 2023-09-01 NOTE — CONSULT NOTE ADULT - PROBLEM SELECTOR RECOMMENDATION 3
No plans for thoracentesis at this time. plan is for repeat scan in the outpatient setting in 6 weeks

## 2023-09-01 NOTE — PROGRESS NOTE ADULT - PROBLEM SELECTOR PROBLEM 2
Atrial fibrillation and flutter
Anemia
Anemia
Atrial fibrillation and flutter

## 2023-09-01 NOTE — CONSULT NOTE ADULT - CONVERSATION DETAILS
Met with patient, her son and dtr Sandrita on the phone. Role of self introduced with acceptance. Family shared that prior to admission, pt was fully independent and lived on her own. She is  with 2 adult children who are very supportive to her. A MOLST was done last night for DNR/I in light of the bleeding episode.     Pt's medical conditions were discussed in detail. Pt was found to have moderate MR on an outpatient echo in 2021. She has remained asymptomatic until earlier this year, and now the work up reveals severe MR and TR. The family has spoken with cardiology and understands that there are plans for FAREED and possible procedural intervention for the MR. However, the prognosis with and without the procedure is uncertain and may be more clear after more work up (ie FAREED). The patient is not in favor of very aggressive measures, however, if interventions may offer her more time with quality of life, she is not opposed. The conclusion of our discussion is to give her the weekend to see how her clinical status continues (if her RP bleed stabilizes without further complications). They are open to consideration of FAREED if she's able to tolerate it, so they can gather more information about her cardiac disease.     Geriatrics and Palliative Medicine Team will continue to follow along. Met with patient, her son and dtr Sandrita on the phone. Role of self introduced with acceptance. Family shared that prior to admission, pt was fully independent and lived on her own. She is  with 2 adult children who are very supportive to her. A MOLST was done last night for DNR/I in light of the RP bleeding episode.     Pt's medical conditions were discussed in detail. Pt was found to have moderate MR on an outpatient echo in 2021. She has remained asymptomatic until earlier this year, and now the work up reveals severe MR and TR. The family has spoken with cardiology and understands that there are plans for FAERED and possible procedural intervention for the MR. However, the prognosis with and without the procedure is uncertain and may be more clear after more work up (ie FAREED). The patient is not in favor of very aggressive measures, however, if interventions may offer her more time with quality of life, she is not opposed. The conclusion of our discussion is to give her the weekend to see how her clinical status continues (if her RP bleed stabilizes without further complications). They are open to pursuing FAREED so they can gather more information about her cardiac disease.     Geriatrics and Palliative Medicine Team will continue to follow along.

## 2023-09-01 NOTE — PROGRESS NOTE ADULT - PROVIDER SPECIALTY LIST ADULT
Nephrology-Cardiorenal
Cardiology
Structural Heart
Trauma Surgery
Cardiology
Structural Heart
Structural Heart
Surgery
Nephrology-Cardiorenal
Nephrology-Cardiorenal
Internal Medicine
Hospitalist
Internal Medicine
Internal Medicine

## 2023-09-01 NOTE — PROGRESS NOTE ADULT - REASON FOR ADMISSION
Tx for structural heart eval

## 2023-09-01 NOTE — PROGRESS NOTE ADULT - PROBLEM SELECTOR PROBLEM 4
CAD (coronary artery disease)
Loculated pleural effusion
Loculated pleural effusion
CAD (coronary artery disease)

## 2023-09-01 NOTE — PROGRESS NOTE ADULT - SUBJECTIVE AND OBJECTIVE BOX
*****Structural Heart Team*****    Subjective:    Patient is resting in bed, still c/o feeling tired and having pain in legs.    PAST MEDICAL & SURGICAL HISTORY:  S/P AVR    Atrial fibrillation    Hypothyroidism    History of appendectomy    S/P AVR          T(C): 36.7 (08-30-23 @ 13:56), Max: 36.9 (08-29-23 @ 20:33)  HR: 83 (08-30-23 @ 13:56) (63 - 91)  BP: 102/56 (08-30-23 @ 13:56) (98/61 - 117/66)  RR: 16 (08-30-23 @ 13:56) (16 - 18)  SpO2: 98% (08-30-23 @ 13:56) (95% - 99%)  Wt(kg): --  08-29 @ 07:01  -  08-30 @ 07:00  --------------------------------------------------------  IN: 480 mL / OUT: 775 mL / NET: -295 mL    08-30 @ 07:01  -  08-30 @ 14:18  --------------------------------------------------------  IN: 240 mL / OUT: 500 mL / NET: -260 mL      MEDICATIONS  (STANDING):  acetaminophen     Tablet .. 650 milliGRAM(s) Oral every 6 hours  aspirin enteric coated 81 milliGRAM(s) Oral daily  heparin  Infusion. 600 Unit(s)/Hr (6 mL/Hr) IV Continuous <Continuous>  levothyroxine 88 MICROGram(s) Oral daily  lidocaine   4% Patch 1 Patch Transdermal daily  metoprolol tartrate 25 milliGRAM(s) Oral two times a day  multivitamin 1 Tablet(s) Oral daily  polyethylene glycol 3350 17 Gram(s) Oral daily  senna 2 Tablet(s) Oral at bedtime  sodium chloride 2 Gram(s) Oral three times a day    MEDICATIONS  (PRN):  cyclobenzaprine 5 milliGRAM(s) Oral three times a day PRN Muscle Spasm  heparin   Injectable 4500 Unit(s) IV Push every 6 hours PRN For aPTT less than 40  heparin   Injectable 2000 Unit(s) IV Push every 6 hours PRN For aPTT between 40 - 57  melatonin 3 milliGRAM(s) Oral at bedtime PRN Insomnia  traMADol 25 milliGRAM(s) Oral every 8 hours PRN Moderate Pain (4 - 6)      Review of Symptoms:  Constitutional: Awake, Alert, Follows commands  Respiratory:  + SOB, + FERRARO  Cardiac: Denies CP, Denies Palpitations  Gastrointestinal: Denies Pain, Denies N/V, tolerating po intake  Vascular: Negative  Extremities: + Edema, No joint pain or swelling  Neurological: Negative  Endocrine: No heat or cold intolerance, No excessive thirst  Heme/Onc: Negative    Exam:  General: A/Ox3, NAOMI, NAD  HEENT: Supple, No JVD, Trachea midline, no masses  Pulmonary: CTAB, = Chest Excursion, no accessory muscle use  Cor: S1S2, II/VI LOLI, Irregular  ECG: AFib  Gastrointestinal: Soft, NT/ND, + Bowel Sounds  Neuro: = motor and sensory B/L, No focal deficits  Vascular: 1+ pulses B/L, trace edema  Extremities: No joint pain or swelling  Skin: Warm/Dry/Normal color, Normal turgor, no rashes                          9.9    13.01 )-----------( 256      ( 30 Aug 2023 06:00 )             28.8   08-30    126<L>  |  90<L>  |  15  ----------------------------<  96  4.2   |  24  |  0.81    Ca    9.4      30 Aug 2023 06:05    TPro  7.1  /  Alb  3.3  /  TBili  1.1  /  DBili  0.2  /  AST  79<H>  /  ALT  29  /  AlkPhos  70  08-30  PT/INR - ( 29 Aug 2023 13:14 )   PT: 25.1 sec;   INR: 2.34 ratio         PTT - ( 30 Aug 2023 06:04 )  PTT:61.0 sec    Imaging Reviewed:    Echocardiogram:    Cardiac Catheterization:        Assesment/Plan:  88 female with Severe MR, acute on Chronic HFpEF  1.) Severe Mitral Regirgitation and TR: Patient was admitted with SOB and fluid overload at [facility] and was transferred here for further evaluation of her MR. She was supposed to have a FAREED today, which was cancelled due to hyponatremia. Patient is being seen by Renal. Again wondering if patient should be on diuretics, and her hyponatremia is due to volume overload. Discussed with Dr. Garcia  2.) Physical Therapy as tolerated  3.) OOB to Chair.    YAS Hendrickson  [id number]
*****Structural Heart Team*****    Subjective:    The patient is resting comfortably in bed, c/o pain in her left leg with movement. She is currently denying any SOB. She has not been out of bed due to her left leg pain.      PAST MEDICAL & SURGICAL HISTORY:  S/P AVR    Atrial fibrillation    Hypothyroidism    History of appendectomy    S/P AVR          T(C): 36.8 (08-29-23 @ 05:00), Max: 36.8 (08-29-23 @ 05:00)  HR: 78 (08-29-23 @ 05:00) (75 - 81)  BP: 113/70 (08-29-23 @ 05:00) (96/59 - 117/75)  RR: 16 (08-29-23 @ 05:00) (16 - 18)  SpO2: 98% (08-29-23 @ 05:00) (94% - 98%)  Wt(kg): --  08-28 @ 07:01  -  08-29 @ 07:00  --------------------------------------------------------  IN: 512 mL / OUT: 1075 mL / NET: -563 mL      MEDICATIONS  (STANDING):  acetaminophen     Tablet .. 650 milliGRAM(s) Oral every 6 hours  aspirin enteric coated 81 milliGRAM(s) Oral daily  heparin  Infusion. 600 Unit(s)/Hr (6 mL/Hr) IV Continuous <Continuous>  levothyroxine 88 MICROGram(s) Oral daily  lidocaine   4% Patch 1 Patch Transdermal daily  metoprolol tartrate 25 milliGRAM(s) Oral two times a day  multivitamin 1 Tablet(s) Oral daily  polyethylene glycol 3350 17 Gram(s) Oral daily    MEDICATIONS  (PRN):  cyclobenzaprine 5 milliGRAM(s) Oral three times a day PRN Muscle Spasm  heparin   Injectable 4500 Unit(s) IV Push every 6 hours PRN For aPTT less than 40  heparin   Injectable 2000 Unit(s) IV Push every 6 hours PRN For aPTT between 40 - 57  melatonin 3 milliGRAM(s) Oral at bedtime PRN Insomnia      Review of Symptoms:  Constitutional: Awake, Alert, Follows commands  Respiratory: + SOB  Cardiac: Denies CP, Denies Palpitations  Gastrointestinal: Denies Pain, Denies N/V, tolerating po intake  Vascular: Negative  Extremities: + Edema, No joint pain or swelling  Neurological: Negative  Endocrine: No heat or cold intolerance, No excessive thirst  Heme/Onc: Negative    Exam:  General: A/Ox3, NAOMI, NAD  HEENT: Supple, No JVD, Trachea midline, no masses  Pulmonary: CTAB, = Chest Excursion, no accessory muscle use  Cor: S1S2, Irregular, II/VI LOLI  ECG: AFib  Gastrointestinal: Soft, NT/ND, + Bowel Sounds  Neuro: = motor and sensory B/L, No focal deficits  Vascular: 1+ Pulses B/L, Trace edema  Extremities: Left leg pain  Skin: Warm/Dry/Normal color, Normal turgor, no rashes                          10.7   11.84 )-----------( 260      ( 29 Aug 2023 03:09 )             31.0   08-29    126<L>  |  90<L>  |  16  ----------------------------<  109<H>  4.0   |  25  |  0.86    Ca    9.4      29 Aug 2023 03:10    PT/INR - ( 28 Aug 2023 13:00 )   PT: 25.7 sec;   INR: 2.51 ratio         PTT - ( 29 Aug 2023 02:54 )  PTT:75.3 sec    Imaging Reviewed:    Echocardiogram:    Cardiac Catheterization:        Assesment/Plan:  87 y/o female with Severe Mitral Regurgitation and Severe Tricuspid regurgitation    1.) Severe Mitral Regirgitation and TR: Patient was admitted with SOB and fluid overload at [facility] and was transferred here for further evaluation of her MR. It was recommended that she be restarted on IV furosemide to help get her to a better volume state prior to her FAREED, which will be tomorrow. She needs a FAREED to better evaluate if she is a candidate for Mitral BITA, and also to see how severe her Tricuspid Regurgitation is. It was initially stopped due to hyponatremia.  2.) Physical Therapy as tolerated  3.) OOB to Chair.    YAS Hendrickson  [id number]
[facility] DIVISION OF KIDNEY DISEASES AND HYPERTENSION   FOLLOW UP NOTE    --------------------------------------------------------------------------------  Chief Complaint:    24 hour events/subjective: Pt. was seen and examined today. Denies any shortness of breath, chest pain, nausea or vomiting, abd pain.        PAST HISTORY  --------------------------------------------------------------------------------  No significant changes to PMH, PSH, FHx, SHx, unless otherwise noted    ALLERGIES & MEDICATIONS  --------------------------------------------------------------------------------  Allergies    penicillin (Rash)  sulfa drugs (Rash)    Intolerances      Standing Inpatient Medications  acetaminophen     Tablet .. 650 milliGRAM(s) Oral every 6 hours  aspirin enteric coated 81 milliGRAM(s) Oral daily  heparin  Infusion. 600 Unit(s)/Hr IV Continuous <Continuous>  levothyroxine 88 MICROGram(s) Oral daily  lidocaine   4% Patch 1 Patch Transdermal daily  metoprolol tartrate 25 milliGRAM(s) Oral two times a day  multivitamin 1 Tablet(s) Oral daily  polyethylene glycol 3350 17 Gram(s) Oral daily  senna 2 Tablet(s) Oral at bedtime  sodium chloride 2 Gram(s) Oral three times a day    PRN Inpatient Medications  cyclobenzaprine 5 milliGRAM(s) Oral three times a day PRN  heparin   Injectable 4500 Unit(s) IV Push every 6 hours PRN  heparin   Injectable 2000 Unit(s) IV Push every 6 hours PRN  melatonin 3 milliGRAM(s) Oral at bedtime PRN  traMADol 25 milliGRAM(s) Oral every 8 hours PRN      REVIEW OF SYSTEMS  --------------------------------------------------------------------------------  Gen: No fevers/chills  Head/Eyes/Ears: No HA   Respiratory: No dyspnea, cough  CV: No chest pain  GI: No abdominal pain, diarrhea  : No dysuria, hematuria  MSK: No  edema  Skin: No rashes  Heme: No easy bruising or bleeding    All other systems were reviewed and are negative, except as noted.    VITALS/PHYSICAL EXAM  --------------------------------------------------------------------------------  T(C): 36.5 (08-30-23 @ 04:23), Max: 36.9 (08-29-23 @ 11:34)  HR: 87 (08-30-23 @ 09:45) (63 - 91)  BP: 98/65 (08-30-23 @ 09:45) (98/61 - 117/67)  RR: 16 (08-30-23 @ 04:23) (16 - 18)  SpO2: 99% (08-30-23 @ 04:23) (95% - 99%)  Wt(kg): --        08-29-23 @ 07:01  -  08-30-23 @ 07:00  --------------------------------------------------------  IN: 480 mL / OUT: 775 mL / NET: -295 mL    08-30-23 @ 07:01  -  08-30-23 @ 10:27  --------------------------------------------------------  IN: 0 mL / OUT: 0 mL / NET: 0 mL        Physical Exam:  	Gen: NAD  	HEENT: Anicteric  	Pulm: CTA B/L  	CV: S1S2+  	Abd: Soft, +BS    	Ext: No LE edema B/L  	Neuro: Awake  	Skin: Warm and dry  	Dialysis access: none      LABS/STUDIES  --------------------------------------------------------------------------------              9.9    13.01 >-----------<  256      [08-30-23 @ 06:00]              28.8     126  |  90  |  15  ----------------------------<  96      [08-30-23 @ 06:05]  4.2   |  24  |  0.81        Ca     9.4     [08-30-23 @ 06:05]    TPro  7.1  /  Alb  3.3  /  TBili  1.1  /  DBili  0.2  /  AST  79  /  ALT  29  /  AlkPhos  70  [08-30-23 @ 06:05]    PT/INR: PT 25.1 , INR 2.34       [08-29-23 @ 13:14]  PTT: 61.0       [08-30-23 @ 06:04]    CK 1269      [08-29-23 @ 03:10]  Serum Osmolality 278      [08-28-23 @ 20:33]    Creatinine Trend:  SCr 0.81 [08-30 @ 06:05]  SCr 0.87 [08-29 @ 17:26]  SCr 0.86 [08-29 @ 03:10]  SCr 0.84 [08-28 @ 20:33]  SCr 1.03 [08-28 @ 06:13]    Urinalysis - [08-30-23 @ 06:05]      Color  / Appearance  / SG  / pH       Gluc 96 / Ketone   / Bili  / Urobili        Blood  / Protein  / Leuk Est  / Nitrite       RBC  / WBC  / Hyaline  / Gran  / Sq Epi  / Non Sq Epi  / Bacteria     Urine Sodium 24      [08-29-23 @ 16:05]  Urine Urea Nitrogen 1332      [08-29-23 @ 07:01]  Urine Osmolality 472      [08-29-23 @ 16:06]        Tacrolimus  Cyclosporine  Sirolimus  Mycophenolate  BK PCR  CMV PCR  Parvo PCR  EBV PCR
SURGERY DAILY PROGRESS NOTE    STATUS POST:      SUBJECTIVE: Pt seen and examined at bedside. No complaints this morning. Denies chest pain, SOB, palpitations, HA, fever, chills, N/V.      OBJECTIVE:  Vital Signs Last 24 Hrs  T(C): 36.6 (31 Aug 2023 04:47), Max: 36.9 (30 Aug 2023 18:00)  T(F): 97.8 (31 Aug 2023 04:47), Max: 98.5 (30 Aug 2023 18:00)  HR: 78 (31 Aug 2023 04:47) (78 - 99)  BP: 100/63 (31 Aug 2023 04:47) (93/55 - 108/59)  BP(mean): --  RR: 18 (31 Aug 2023 04:47) (16 - 20)  SpO2: 95% (31 Aug 2023 04:47) (94% - 98%)    Parameters below as of 31 Aug 2023 04:47  Patient On (Oxygen Delivery Method): room air        I&O's Summary    30 Aug 2023 07:01  -  31 Aug 2023 07:00  --------------------------------------------------------  IN: 240 mL / OUT: 700 mL / NET: -460 mL        Physical Exam:  General Appearance: Appears well, NAD, A& O x 3  Neck: Trachea midline   Chest: Equal expansion bilaterally  Abdomen: Soft, nondistended.   Extremities: Grossly symmetric, SCD's in place     LABS:                        10.4   12.70 )-----------( 246      ( 31 Aug 2023 06:21 )             30.9     08-31    131<L>  |  95<L>  |  17  ----------------------------<  99  4.2   |  22  |  0.87    Ca    9.2      31 Aug 2023 06:21  Mg     1.9     08-30    TPro  6.7  /  Alb  3.1<L>  /  TBili  1.4<H>  /  DBili  0.3  /  AST  85<H>  /  ALT  33  /  AlkPhos  64  08-31    PT/INR - ( 30 Aug 2023 21:49 )   PT: 19.0 sec;   INR: 1.84 ratio         PTT - ( 31 Aug 2023 06:21 )  PTT:29.4 sec  Urinalysis Basic - ( 31 Aug 2023 06:21 )    Color: x / Appearance: x / SG: x / pH: x  Gluc: 99 mg/dL / Ketone: x  / Bili: x / Urobili: x   Blood: x / Protein: x / Nitrite: x   Leuk Esterase: x / RBC: x / WBC x   Sq Epi: x / Non Sq Epi: x / Bacteria: x        RADIOLOGY & ADDITIONAL STUDIES:
SURGERY PROGRESS NOTE    SUBJECTIVE / 24H EVENTS:  Patient seen and examined on morning rounds. s/p RRT in IR PACU yesterday. Patient reports feeling somewhat better this morning but not totally comfortable      OBJECTIVE:  VITAL SIGNS:  T(C): 36.5 (23 @ 08:00), Max: 36.9 (23 @ 17:54)  HR: 107 (23 @ 08:00) (85 - 134)  BP: 96/63 (23 @ 08:00) (76/56 - 121/72)  RR: 18 (23 @ 08:00) (12 - 24)  SpO2: 98% (23 @ 08:00) (82% - 99%)  Daily     Daily Weight in k.2 (01 Sep 2023 06:33)  POCT Blood Glucose.: 131 mg/dL (23 @ 16:31)      PHYSICAL EXAM:  Gen: NAD  LS: Respirations unlabored on RA  GI: Soft. Nontender. Nondistended.   Groin: R groin dressing c/d/i        23 @ 07:01  -  23 @ 07:00  --------------------------------------------------------  IN:  Total IN: 0 mL    OUT:    Oral Fluid: 0 mL    Voided (mL): 1000 mL  Total OUT: 1000 mL    Total NET: -1000 mL          LAB VALUES:      134<L>  |  97  |  24<H>  ----------------------------<  114<H>  4.3   |  21<L>  |  1.12    Ca    9.1      01 Sep 2023 06:33  Mg     1.9     08-30    TPro  6.7  /  Alb  3.0<L>  /  TBili  1.3<H>  /  DBili  x   /  AST  193<H>  /  ALT  39  /  AlkPhos  66                                 9.8    15.13 )-----------( 322      ( 01 Sep 2023 06:33 )             29.0     LIVER FUNCTIONS - ( 01 Sep 2023 06:33 )  Alb: 3.0 g/dL / Pro: 6.7 g/dL / ALK PHOS: 66 U/L / ALT: 39 U/L / AST: 193 U/L / GGT: x           PT/INR - ( 31 Aug 2023 16:40 )   PT: 16.8 sec;   INR: 1.55 ratio         PTT - ( 01 Sep 2023 06:33 )  PTT:36.5 sec    CARDIAC MARKERS ( 01 Sep 2023 06:33 )  x     / x     / 2476 U/L / x     / 126.7 ng/mL  CARDIAC MARKERS ( 01 Sep 2023 00:10 )  x     / x     / 2352 U/L / x     / 122.8 ng/mL  CARDIAC MARKERS ( 31 Aug 2023 16:40 )  x     / x     / 1905 U/L / x     / 34.3 ng/mL      Urinalysis Basic - ( 01 Sep 2023 06:33 )    Color: x / Appearance: x / SG: x / pH: x  Gluc: 114 mg/dL / Ketone: x  / Bili: x / Urobili: x   Blood: x / Protein: x / Nitrite: x   Leuk Esterase: x / RBC: x / WBC x   Sq Epi: x / Non Sq Epi: x / Bacteria: x        MICROBIOLOGY:    Culture - Blood (collected 30 Aug 2023 12:19)  Source: .Blood Blood-Peripheral  Preliminary Report (31 Aug 2023 15:02):    No growth at 24 hours    Culture - Blood (collected 30 Aug 2023 12:19)  Source: .Blood Blood-Peripheral  Preliminary Report (31 Aug 2023 15:02):    No growth at 24 hours        RADIOLOGY:  < from: CT Abdomen and Pelvis No Cont (23 @ 17:12) >    ABDOMEN AND PELVIS:  *  No significant change in size of a left retroperitoneal hematoma.   Unable to assess for active hemorrhage in the absence of intravenous   contrast.  *  Thickening of the endometrial complex. Recommend further evaluation   with pelvic ultrasound.  *  Bilaterally persistent nephrograms, which may be seen with acute   tubular necrosis/renal dysfunction.    < end of copied text >        MEDICATIONS  (STANDING):  acetaminophen     Tablet .. 650 milliGRAM(s) Oral every 6 hours  heparin  Infusion 600 Unit(s)/Hr (6 mL/Hr) IV Continuous <Continuous>  levoFLOXacin IVPB      levothyroxine 88 MICROGram(s) Oral daily  lidocaine   4% Patch 1 Patch Transdermal daily  metoprolol tartrate 25 milliGRAM(s) Oral two times a day  multivitamin 1 Tablet(s) Oral daily  polyethylene glycol 3350 17 Gram(s) Oral daily  senna 2 Tablet(s) Oral at bedtime  sodium chloride 2 Gram(s) Oral three times a day    MEDICATIONS  (PRN):  cyclobenzaprine 5 milliGRAM(s) Oral three times a day PRN Muscle Spasm  melatonin 3 milliGRAM(s) Oral at bedtime PRN Insomnia  morphine  - Injectable 2 milliGRAM(s) IV Push every 6 hours PRN Moderate Pain (4 - 6)       
HPI/Interval Hx    Pt. resting in bed. She is POD #1 s/p IR pelvic angiogram with successful coil embolization of extravasation from a left lumbar branch. She is feeling well today without complaint. The structural team is following for evaluation of mitral regurgitation.   	    MEDICATIONS  (STANDING):  acetaminophen     Tablet .. 650 milliGRAM(s) Oral every 6 hours  heparin  Infusion 600 Unit(s)/Hr (6.5 mL/Hr) IV Continuous <Continuous>  levoFLOXacin IVPB      levothyroxine 88 MICROGram(s) Oral daily  lidocaine   4% Patch 1 Patch Transdermal daily  metoprolol tartrate 25 milliGRAM(s) Oral two times a day  multivitamin 1 Tablet(s) Oral daily  polyethylene glycol 3350 17 Gram(s) Oral daily  senna 2 Tablet(s) Oral at bedtime  sodium chloride 2 Gram(s) Oral three times a day    MEDICATIONS  (PRN):  cyclobenzaprine 5 milliGRAM(s) Oral three times a day PRN Muscle Spasm  melatonin 3 milliGRAM(s) Oral at bedtime PRN Insomnia  morphine  - Injectable 2 milliGRAM(s) IV Push every 6 hours PRN Moderate Pain (4 - 6)      PAST MEDICAL & SURGICAL HISTORY:  S/P AVR      Atrial fibrillation      Hypothyroidism      History of appendectomy      S/P AVR    Review of Systems  CONSTITUTIONAL: No weakness, fevers or chills, (+) fatigue  EYES/ENT: No visual changes;  No vertigo or throat pain   NECK: No pain or stiffness  RESPIRATORY: No cough, wheezing, hemoptysis; No shortness of breath at rest  CARDIOVASCULAR: No chest pain or palpitations, PND, orthopnea, or edema   GASTROINTESTINAL: No abdominal or epigastric pain. No nausea, vomiting, or hematemesis; No diarrhea or constipation. No melena or hematochezia.  GENITOURINARY: No dysuria, frequency or hematuria  NEUROLOGICAL: No numbness or weakness  SKIN: No itching, burning, rashes, or lesions   All other review of systems is negative unless indicated above.    Physical Exam  General: A/ox 3, No acute Distress  Neck: Supple, NO JVD  Cardiac: S1 S2, (+) apical murmur  Pulmonary: Breathing unlabored, No Rhonchi/Rales/Wheezing. diminished at bases bilaterally   Abdomen: Soft, Non -tender, +BS x 4 quads  Extremities: No Rashes, No edema  Neuro: A/o x 3, No focal deficits    Vital Signs Last 24 Hrs  T(C): 36.3 (01 Sep 2023 12:15), Max: 36.9 (31 Aug 2023 17:54)  T(F): 97.4 (01 Sep 2023 12:15), Max: 98.4 (31 Aug 2023 17:54)  HR: 119 (01 Sep 2023 12:15) (85 - 134)  BP: 99/61 (01 Sep 2023 12:15) (76/56 - 121/72)  BP(mean): 74 (31 Aug 2023 16:28) (63 - 75)  RR: 18 (01 Sep 2023 12:15) (12 - 24)  SpO2: 98% (01 Sep 2023 12:15) (82% - 99%)    Parameters below as of 01 Sep 2023 12:15  Patient On (Oxygen Delivery Method): nasal cannula  O2 Flow (L/min): 6                          10.3   14.51 )-----------( 344      ( 01 Sep 2023 12:33 )             31.3     09-01    134<L>  |  97  |  24<H>  ----------------------------<  114<H>  4.3   |  21<L>  |  1.12    Ca    9.1      01 Sep 2023 06:33  Mg     1.9     08-30    TPro  6.7  /  Alb  3.0<L>  /  TBili  1.3<H>  /  DBili  x   /  AST  193<H>  /  ALT  39  /  AlkPhos  66  09-01      Other    < from: TTE W or WO Ultrasound Enhancing Agent (08.28.23 @ 07:33) >  TRANSTHORACIC ECHOCARDIOGRAM REPORT  ________________________________________________________________________________                                      _______       Pt. Name:       YVETTE VIVAS Study Date:    8/28/2023  MRN:            [id number]    YOB: 1935    < end of copied text >  < from: TTE W or WO Ultrasound Enhancing Agent (08.28.23 @ 07:33) >  ________________________________________________________________________________________  FINDINGS:     Left Ventricle:  Left ventricular systolic function is normal with a calculated ejection fraction of 58 % by the Rae's biplane method of disks. There is normal LV mass and concentric remodeling.     Right Ventricle:  The right ventricle is not well visualized. Normal right ventricular systolic function. Tricuspid annular plane systolic excursion (TAPSE) is 1.0 cm (normal >=1.7 cm). Tricuspid annular tissue Doppler S' is 7.5 cm/s (normal >10 cm/s).     Left Atrium:  The left atrium is moderately dilated in size with an indexed volume of 55.19 ml/m².     Right Atrium:  The right atrium is normal in size with an indexed volume of 26.84 ml/m².     Aortic Valve:  A mechanical valve replacement present in the aortic position. The prosthetic valve is well seated with normal function. There is trace intravalvular regurgitation. There is moderate calcification of the aortic valve leaflets. There is moderate aortic stenosis.     Mitral Valve:  There is calcification of the mitral valve annulus. There is severe leaflet calcification. There is mild mitral valve stenosis. There is moderate mitral regurgitation.     Tricuspid Valve:  There is moderate tricuspid regurgitation. Estimated pulmonary artery systolic pressure is 26 mmHg, consistent withnormal pulmonary artery pressure.     Pulmonic Valve:  Structurally normal pulmonic valve with normal leaflet excursion. There is mild pulmonic regurgitation.     Systemic Veins:  The inferior vena cava is normal in size measuring 0.60 cm in diameter, (normal <2.1cm) with abnormal inspiratory collapse (abnormal <50%) consistent with mildly elevated right atrial pressure (~8, range 5-10mmHg).  ____________________________________________________________________    < end of copied text >  
HPI  comfortable without SOB.  Still having pain in right thigh and unable to bear weight.    PAST MEDICAL & SURGICAL HISTORY:  S/P AVR      Atrial fibrillation      Hypothyroidism      History of appendectomy      S/P AVR        Medications:  acetaminophen     Tablet .. 650 milliGRAM(s) Oral every 6 hours  aspirin enteric coated 81 milliGRAM(s) Oral daily  cyclobenzaprine 5 milliGRAM(s) Oral three times a day PRN  heparin   Injectable 4500 Unit(s) IV Push every 6 hours PRN  heparin   Injectable 2000 Unit(s) IV Push every 6 hours PRN  heparin  Infusion. 600 Unit(s)/Hr IV Continuous <Continuous>  levothyroxine 88 MICROGram(s) Oral daily  lidocaine   4% Patch 1 Patch Transdermal daily  melatonin 3 milliGRAM(s) Oral at bedtime PRN  metoprolol tartrate 25 milliGRAM(s) Oral two times a day  multivitamin 1 Tablet(s) Oral daily  polyethylene glycol 3350 17 Gram(s) Oral daily  senna 2 Tablet(s) Oral at bedtime  sodium chloride 2 Gram(s) Oral three times a day  traMADol 25 milliGRAM(s) Oral every 8 hours PRN      Vitals:  T(C): 36.5 (23 @ 04:23), Max: 36.9 (23 @ 11:34)  HR: 87 (23 @ 09:45) (63 - 91)  BP: 98/65 (23 @ 09:45) (98/61 - 117/67)  BP(mean): 73 (23 @ 20:33) (73 - 73)  RR: 16 (23 @ 04:23) (16 - 18)  SpO2: 99% (23 @ 04:23) (95% - 99%)  Wt(kg): --  Daily     Daily Weight in k.6 (30 Aug 2023 06:42)  I&O's Summary    29 Aug 2023 07:  -  30 Aug 2023 07:00  --------------------------------------------------------  IN: 480 mL / OUT: 775 mL / NET: -295 mL    30 Aug 2023 07:01  -  30 Aug 2023 10:42  --------------------------------------------------------  IN: 0 mL / OUT: 0 mL / NET: 0 mL        Physical Exam:  Appearance: [ x] Normal [ ] NAD  Eyes: [ ] PERRL [ ] EOMI  HENT: [x ] Normal oral muscosa [ ]NC/AT  Cardiovascular: irregular no murmur, no edema  Procedural Access Site: [ ] No hematoma [ ] Non-tender to palpation [ ] 2+ pulse [ ] No bruit [ ] No Ecchymosis  Respiratory: [ ] Clear to auscultation bilaterally  Gastrointestinal: [ ] Soft [ ] Non-tender [ ] Non-distended [ ] BS+  Musculoskeletal: [ ] No clubbing [ ] No joint deformity   Neurologic: [ ] Non-focal  Lymphatic: [ ] No lymphadenopathy  Psychiatry: [ ] AAOx3 [ ] Mood & affect appropriate  Skin: [ ] No rashes [ ] No ecchymoses [ ] No cyanosis        126<L>  |  90<L>  |  15  ----------------------------<  96  4.2   |  24  |  0.81    Ca    9.4      30 Aug 2023 06:05    TPro  7.1  /  Alb  3.3  /  TBili  1.1  /  DBili  0.2  /  AST  79<H>  /  ALT  29  /  AlkPhos  70      PT/INR - ( 29 Aug 2023 13:14 )   PT: 25.1 sec;   INR: 2.34 ratio         PTT - ( 30 Aug 2023 06:04 )  PTT:61.0 sec  CARDIAC MARKERS ( 29 Aug 2023 03:10 )  x     / x     / 1269 U/L / x     / x                  ECG:    Echo:    Stress Testing:     Cath:    Imaging:    Interpretation of Telemetry:  
I had a bad day, I don't want any test today.      PAST MEDICAL & SURGICAL HISTORY:  S/P AVR      Atrial fibrillation      Hypothyroidism      History of appendectomy      S/P AVR        Medications:  acetaminophen     Tablet .. 650 milliGRAM(s) Oral every 6 hours  cyclobenzaprine 5 milliGRAM(s) Oral three times a day PRN  heparin  Infusion 600 Unit(s)/Hr IV Continuous <Continuous>  levoFLOXacin IVPB      levothyroxine 88 MICROGram(s) Oral daily  lidocaine   4% Patch 1 Patch Transdermal daily  melatonin 3 milliGRAM(s) Oral at bedtime PRN  metoprolol tartrate 25 milliGRAM(s) Oral two times a day  morphine  - Injectable 2 milliGRAM(s) IV Push every 6 hours PRN  multivitamin 1 Tablet(s) Oral daily  polyethylene glycol 3350 17 Gram(s) Oral daily  senna 2 Tablet(s) Oral at bedtime  sodium chloride 2 Gram(s) Oral three times a day      Vitals:  T(C): 36.5 (23 @ 08:00), Max: 36.9 (23 @ 17:54)  HR: 107 (23 @ 08:00) (85 - 134)  BP: 96/63 (23 @ 08:00) (76/56 - 121/72)  BP(mean): 74 (23 @ 16:28) (63 - 75)  RR: 18 (23 @ 08:00) (12 - 24)  SpO2: 98% (23 @ 08:00) (82% - 99%)  Wt(kg): --  Daily     Daily Weight in k.2 (01 Sep 2023 06:33)  I&O's Summary    31 Aug 2023 07:01  -  01 Sep 2023 07:00  --------------------------------------------------------  IN: 0 mL / OUT: 1000 mL / NET: -1000 mL        Physical Exam:  Appearance: [ ] Normal [x ] NAD  Eyes: [ ] PERRL [ ] EOMI  HENT: [ ] Normal oral mus[place]a [ ]NC/AT  Cardiovascular: [x ] S1 [x ] S2 [ ] RRR [ ] No m/r/g [ ]No edema [ ] JVP  Procedural Access Site: [ ] No hematoma [ ] Non-tender to palpation [ ] 2+ pulse [ ] No bruit [ ] No Ecchymosis  Respiratory: [ x] Clear to auscultation bilaterally  Gastrointestinal: [ ] Soft [ ] Non-tender [ ] Non-distended [ ] BS+  Musculoskeletal: [ ] No clubbing [ ] No joint deformity   Neurologic: [ ] Non-focal  Lymphatic: [ ] No lymphadenopathy  Psychiatry: [ ] AAOx3 [ ] Mood & affect appropriate  Skin: [ ] No rashes [ ] No ecchymoses [ ] No cyanosis        134<L>  |  97  |  24<H>  ----------------------------<  114<H>  4.3   |  21<L>  |  1.12    Ca    9.1      01 Sep 2023 06:33  Mg     1.9     08-30    TPro  6.7  /  Alb  3.0<L>  /  TBili  1.3<H>  /  DBili  x   /  AST  193<H>  /  ALT  39  /  AlkPhos  66  09-01    PT/INR - ( 31 Aug 2023 16:40 )   PT: 16.8 sec;   INR: 1.55 ratio         PTT - ( 01 Sep 2023 06:33 )  PTT:36.5 sec  CARDIAC MARKERS ( 01 Sep 2023 06:33 )  x     / x     / 2476 U/L / x     / 126.7 ng/mL  CARDIAC MARKERS ( 01 Sep 2023 00:10 )  x     / x     / 2352 U/L / x     / 122.8 ng/mL  CARDIAC MARKERS ( 31 Aug 2023 16:40 )  x     / x     / 1905 U/L / x     / 34.3 ng/mL              ECG:    Echo:    Stress Testing:     Cath:    Imaging:    Interpretation of Telemetry:  
[facility] DIVISION OF KIDNEY DISEASES AND HYPERTENSION   FOLLOW UP NOTE    --------------------------------------------------------------------------------  Chief Complaint:    24 hour events/subjective: Pt. was seen and examined today. Patient had retroperitoneal bleed S/p emobolization and developed hypotension requiring rapid response overnight.      PAST HISTORY  --------------------------------------------------------------------------------  No significant changes to PMH, PSH, FHx, SHx, unless otherwise noted    ALLERGIES & MEDICATIONS  --------------------------------------------------------------------------------  Allergies    penicillin (Rash)  sulfa drugs (Rash)    Intolerances      Standing Inpatient Medications  acetaminophen     Tablet .. 650 milliGRAM(s) Oral every 6 hours  heparin  Infusion 600 Unit(s)/Hr IV Continuous <Continuous>  levoFLOXacin IVPB      levothyroxine 88 MICROGram(s) Oral daily  lidocaine   4% Patch 1 Patch Transdermal daily  metoprolol tartrate 25 milliGRAM(s) Oral two times a day  multivitamin 1 Tablet(s) Oral daily  polyethylene glycol 3350 17 Gram(s) Oral daily  senna 2 Tablet(s) Oral at bedtime  sodium chloride 2 Gram(s) Oral three times a day    PRN Inpatient Medications  cyclobenzaprine 5 milliGRAM(s) Oral three times a day PRN  melatonin 3 milliGRAM(s) Oral at bedtime PRN  morphine  - Injectable 2 milliGRAM(s) IV Push every 6 hours PRN      REVIEW OF SYSTEMS  --------------------------------------------------------------------------------  Gen: No fevers/chills  Head/Eyes/Ears: No HA   Respiratory: No dyspnea, cough  CV: No chest pain  GI: No abdominal pain, diarrhea  : No dysuria, hematuria  MSK: No  edema  Skin: No rashes  Heme: No easy bruising or bleeding    All other systems were reviewed and are negative, except as noted.    VITALS/PHYSICAL EXAM  --------------------------------------------------------------------------------  T(C): 36.5 (09-01-23 @ 08:00), Max: 36.9 (08-31-23 @ 17:54)  HR: 107 (09-01-23 @ 08:00) (85 - 134)  BP: 96/63 (09-01-23 @ 08:00) (76/56 - 121/72)  RR: 18 (09-01-23 @ 08:00) (12 - 24)  SpO2: 98% (09-01-23 @ 08:00) (82% - 99%)  Wt(kg): --        08-31-23 @ 07:01  -  09-01-23 @ 07:00  --------------------------------------------------------  IN: 0 mL / OUT: 1000 mL / NET: -1000 mL        Physical Exam:  	Gen: Ill appearing   	HEENT: supple neck, clear oropharynx  	Pulm: CTA B/L  	CV: RRR, S1S2; no rub  	Abd: +BS, soft, nontender/nondistended  	: No suprapubic tenderness + urinary catheter  	UE: Warm, no edema; no asterixis  	LE: Warm, no edema  	Neuro: No focal deficits, intact gait  	Psych: Normal affect and mood  	Skin: Warm, without rashes  	Vascular access: none      LABS/STUDIES  --------------------------------------------------------------------------------              9.8    15.13 >-----------<  322      [09-01-23 @ 06:33]              29.0     134  |  97  |  24  ----------------------------<  114      [09-01-23 @ 06:33]  4.3   |  21  |  1.12        Ca     9.1     [09-01-23 @ 06:33]      Mg     1.9     [08-30-23 @ 21:49]    TPro  6.7  /  Alb  3.0  /  TBili  1.3  /  DBili  x   /  AST  193  /  ALT  39  /  AlkPhos  66  [09-01-23 @ 06:33]    PT/INR: PT 16.8 , INR 1.55       [08-31-23 @ 16:40]  PTT: 36.5       [09-01-23 @ 06:33]    CK 2476      [09-01-23 @ 06:33]        [08-31-23 @ 06:21]    Creatinine Trend:  SCr 1.12 [09-01 @ 06:33]  SCr 0.90 [08-31 @ 16:40]  SCr 0.87 [08-31 @ 06:21]  SCr 0.90 [08-30 @ 21:49]  SCr 0.88 [08-30 @ 20:19]    Urinalysis - [09-01-23 @ 06:33]      Color  / Appearance  / SG  / pH       Gluc 114 / Ketone   / Bili  / Urobili        Blood  / Protein  / Leuk Est  / Nitrite       RBC  / WBC  / Hyaline  / Gran  / Sq Epi  / Non Sq Epi  / Bacteria     Urine Sodium 24      [08-29-23 @ 16:05]  Urine Urea Nitrogen 1332      [08-29-23 @ 07:01]  Urine Osmolality 472      [08-29-23 @ 16:06]        Tacrolimus  Cyclosporine  Sirolimus  Mycophenolate  BK PCR  CMV PCR  Parvo PCR  EBV PCR
[facility] Division of Kidney Diseases & Hypertension  FOLLOW UP NOTE  --------------------------------------------------------------------------------  Chief Complaint:    24 hour events/subjective: Had a drop in Hb and noted to have a retroperitoneal bleed.         PAST HISTORY  --------------------------------------------------------------------------------  No significant changes to PMH, PSH, FHx, SHx, unless otherwise noted    ALLERGIES & MEDICATIONS  --------------------------------------------------------------------------------  Allergies    penicillin (Rash)  sulfa drugs (Rash)    Intolerances      Standing Inpatient Medications  acetaminophen     Tablet .. 650 milliGRAM(s) Oral every 6 hours  HYDROmorphone  Injectable 0.25 milliGRAM(s) IV Push every 15 minutes  levothyroxine 88 MICROGram(s) Oral daily  lidocaine   4% Patch 1 Patch Transdermal daily  metoprolol tartrate 25 milliGRAM(s) Oral two times a day  multivitamin 1 Tablet(s) Oral daily  polyethylene glycol 3350 17 Gram(s) Oral daily  senna 2 Tablet(s) Oral at bedtime  sodium chloride 2 Gram(s) Oral three times a day    PRN Inpatient Medications  cyclobenzaprine 5 milliGRAM(s) Oral three times a day PRN  melatonin 3 milliGRAM(s) Oral at bedtime PRN  traMADol 25 milliGRAM(s) Oral every 8 hours PRN      REVIEW OF SYSTEMS  --------------------------------------------------------------------------------  Gen: No weight changes, fatigue, fevers/chills, weakness  Skin: No rashes  Head/Eyes/Ears/Mouth: No headache; Normal hearing; Normal vision w/o blurriness; No sinus pain/discomfort, sore throat  Respiratory: No dyspnea, cough, wheezing, hemoptysis  CV: No chest pain, PND, orthopnea  GI: No abdominal pain, diarrhea, constipation, nausea, vomiting, melena, hematochezia  : No increased frequency, dysuria, hematuria, nocturia  Heme: No easy bruising or bleeding  Endo: No heat/cold intolerance  Psych: No significant nervousness, anxiety, stress, depression    All other systems were reviewed and are negative, except as noted.    VITALS/PHYSICAL EXAM  --------------------------------------------------------------------------------  T(C): 36.4 (08-31-23 @ 15:10), Max: 37 (08-31-23 @ 08:22)  HR: 109 (08-31-23 @ 15:55) (78 - 113)  BP: 96/50 (08-31-23 @ 15:55) (76/56 - 108/63)  RR: 19 (08-31-23 @ 15:55) (12 - 20)  SpO2: 89% (08-31-23 @ 15:55) (82% - 99%)  Wt(kg): --        08-30-23 @ 07:01  -  08-31-23 @ 07:00  --------------------------------------------------------  IN: 240 mL / OUT: 700 mL / NET: -460 mL    08-31-23 @ 07:01  -  08-31-23 @ 16:58  --------------------------------------------------------  IN: 0 mL / OUT: 0 mL / NET: 0 mL      Physical Exam:  	Gen: NAD, well-appearing  	HEENT: supple neck, clear oropharynx  	Pulm: CTA B/L  	CV: RRR, S1S2; no rub  	Abd: +BS, soft, nontender/nondistended  	: No suprapubic tenderness + urinary catheter  	UE: Warm, no edema; no asterixis  	LE: Warm, no edema  	Neuro: No focal deficits, intact gait  	Psych: Normal affect and mood  	Skin: Warm, without rashes  	Vascular access: none    LABS/STUDIES  --------------------------------------------------------------------------------              10.1   13.94 >-----------<  287      [08-31-23 @ 16:40]              29.7     131  |  95  |  17  ----------------------------<  99      [08-31-23 @ 06:21]  4.2   |  22  |  0.87        Ca     9.2     [08-31-23 @ 06:21]      Mg     1.9     [08-30-23 @ 21:49]    TPro  6.7  /  Alb  3.1  /  TBili  1.4  /  DBili  0.3  /  AST  85  /  ALT  33  /  AlkPhos  64  [08-31-23 @ 06:21]    PT/INR: PT 16.8 , INR 1.55       [08-31-23 @ 16:40]  PTT: 27.5       [08-31-23 @ 16:40]          [08-31-23 @ 06:21]    Creatinine Trend:  SCr 0.87 [08-31 @ 06:21]  SCr 0.90 [08-30 @ 21:49]  SCr 0.88 [08-30 @ 20:19]  SCr 0.81 [08-30 @ 06:05]  SCr 0.87 [08-29 @ 17:26]    Urinalysis - [08-31-23 @ 06:21]      Color  / Appearance  / SG  / pH       Gluc 99 / Ketone   / Bili  / Urobili        Blood  / Protein  / Leuk Est  / Nitrite       RBC  / WBC  / Hyaline  / Gran  / Sq Epi  / Non Sq Epi  / Bacteria     Urine Sodium 24      [08-29-23 @ 16:05]  Urine Urea Nitrogen 1332      [08-29-23 @ 07:01]  Urine Osmolality 472      [08-29-23 @ 16:06]    Iron 49, TIBC 297, %sat 16      [08-31-23 @ 06:21]  Ferritin 211      [08-31-23 @ 06:21]      
Patient is a 88y old  Female who presents with a chief complaint of Tx for structural heart eval     ADDITIONAL REVIEW OF SYSTEMS: still with left hip discomfort with movement. Feels fatigue, had dry heaving this morning. Also with SOB; remains on 4-6L NC.     MEDICATIONS  (STANDING):  aspirin enteric coated 81 milliGRAM(s) Oral daily  atorvastatin 20 milliGRAM(s) Oral at bedtime  digoxin     Tablet 62.5 MICROGram(s) Oral daily  heparin  Infusion. 600 Unit(s)/Hr (6 mL/Hr) IV Continuous <Continuous>  levothyroxine 88 MICROGram(s) Oral daily  metoprolol succinate ER 25 milliGRAM(s) Oral daily  polyethylene glycol 3350 17 Gram(s) Oral daily    MEDICATIONS  (PRN):  acetaminophen     Tablet .. 650 milliGRAM(s) Oral every 6 hours PRN Temp greater or equal to 38C (100.4F), Mild Pain (1 - 3)  heparin   Injectable 4500 Unit(s) IV Push every 6 hours PRN For aPTT less than 40  heparin   Injectable 2000 Unit(s) IV Push every 6 hours PRN For aPTT between 40 - 57  melatonin 3 milliGRAM(s) Oral at bedtime PRN Insomnia      CAPILLARY BLOOD GLUCOSE        I&O's Summary    26 Aug 2023 07:01  -  27 Aug 2023 07:00  --------------------------------------------------------  IN: 900 mL / OUT: 800 mL / NET: 100 mL    27 Aug 2023 07:01  -  27 Aug 2023 13:25  --------------------------------------------------------  IN: 240 mL / OUT: 0 mL / NET: 240 mL      Vital Signs Last 24 Hrs  T(C): 36.4 (28 Aug 2023 11:36), Max: 36.7 (27 Aug 2023 19:27)  T(F): 97.6 (28 Aug 2023 11:36), Max: 98 (27 Aug 2023 19:27)  HR: 75 (28 Aug 2023 11:36) (75 - 91)  BP: 117/75 (28 Aug 2023 11:36) (107/67 - 128/77)  BP(mean): --  RR: 18 (28 Aug 2023 11:36) (16 - 18)  SpO2: 94% (28 Aug 2023 11:36) (94% - 97%)    Parameters below as of 28 Aug 2023 11:36  Patient On (Oxygen Delivery Method): room air      CONSTITUTIONAL: NAD  EYES: EOMI; conjunctiva and sclera clear  ENMT: MMM  NECK: Supple  RESPIRATORY: On NC; diminished breath sounds at the bases   CARDIOVASCULAR: RRR, no peripheral edema   ABDOMEN: Nontender to palpation, normoactive BS, no guarding/rigidity, no flank ecchymosis   MUSCLOSKELETAL: moves all extremities   PSYCH: A+O x 3, affect normal  NEUROLOGY: CN 2-12 are intact and symmetric; no gross sensory or motor deficits      LABS:                         10.3   14.51 )-----------( 344      ( 01 Sep 2023 12:33 )             31.3     09-01    134<L>  |  97  |  24<H>  ----------------------------<  114<H>  4.3   |  21<L>  |  1.12    Ca    9.1      01 Sep 2023 06:33  Mg     1.9     08-30    TPro  6.7  /  Alb  3.0<L>  /  TBili  1.3<H>  /  DBili  x   /  AST  193<H>  /  ALT  39  /  AlkPhos  66  09-01    PT/INR - ( 31 Aug 2023 16:40 )   PT: 16.8 sec;   INR: 1.55 ratio         PTT - ( 01 Sep 2023 06:33 )  PTT:36.5 sec  Urinalysis Basic - ( 01 Sep 2023 06:33 )    Color: x / Appearance: x / SG: x / pH: x  Gluc: 114 mg/dL / Ketone: x  / Bili: x / Urobili: x   Blood: x / Protein: x / Nitrite: x   Leuk Esterase: x / RBC: x / WBC x   Sq Epi: x / Non Sq Epi: x / Bacteria: x      CARDIAC MARKERS ( 01 Sep 2023 06:33 )  x     / x     / 2476 U/L / x     / 126.7 ng/mL  CARDIAC MARKERS ( 01 Sep 2023 00:10 )  x     / x     / 2352 U/L / x     / 122.8 ng/mL  CARDIAC MARKERS ( 31 Aug 2023 16:40 )  x     / x     / 1905 U/L / x     / 34.3 ng/mL
Patient is a 88y old  Female who presents with a chief complaint of Tx for structural heart eval     ADDITIONAL REVIEW OF SYSTEMS: still with left hip discomfort. Denies CP, SOB, abd pain.     MEDICATIONS  (STANDING):  aspirin enteric coated 81 milliGRAM(s) Oral daily  atorvastatin 20 milliGRAM(s) Oral at bedtime  digoxin     Tablet 62.5 MICROGram(s) Oral daily  heparin  Infusion. 600 Unit(s)/Hr (6 mL/Hr) IV Continuous <Continuous>  levothyroxine 88 MICROGram(s) Oral daily  metoprolol succinate ER 25 milliGRAM(s) Oral daily  polyethylene glycol 3350 17 Gram(s) Oral daily    MEDICATIONS  (PRN):  acetaminophen     Tablet .. 650 milliGRAM(s) Oral every 6 hours PRN Temp greater or equal to 38C (100.4F), Mild Pain (1 - 3)  heparin   Injectable 4500 Unit(s) IV Push every 6 hours PRN For aPTT less than 40  heparin   Injectable 2000 Unit(s) IV Push every 6 hours PRN For aPTT between 40 - 57  melatonin 3 milliGRAM(s) Oral at bedtime PRN Insomnia      CAPILLARY BLOOD GLUCOSE        I&O's Summary    26 Aug 2023 07:01  -  27 Aug 2023 07:00  --------------------------------------------------------  IN: 900 mL / OUT: 800 mL / NET: 100 mL    27 Aug 2023 07:01  -  27 Aug 2023 13:25  --------------------------------------------------------  IN: 240 mL / OUT: 0 mL / NET: 240 mL      Vital Signs Last 24 Hrs  T(C): 36.4 (28 Aug 2023 11:36), Max: 36.7 (27 Aug 2023 19:27)  T(F): 97.6 (28 Aug 2023 11:36), Max: 98 (27 Aug 2023 19:27)  HR: 75 (28 Aug 2023 11:36) (75 - 91)  BP: 117/75 (28 Aug 2023 11:36) (107/67 - 128/77)  BP(mean): --  RR: 18 (28 Aug 2023 11:36) (16 - 18)  SpO2: 94% (28 Aug 2023 11:36) (94% - 97%)    Parameters below as of 28 Aug 2023 11:36  Patient On (Oxygen Delivery Method): room air      CONSTITUTIONAL: NAD  EYES: EOMI; conjunctiva and sclera clear  ENMT: MMM  NECK: Supple  RESPIRATORY: Normal respiratory effort; CTAB  CARDIOVASCULAR: RRR, no peripheral edema   ABDOMEN: Nontender to palpation, normoactive BS, no guarding/rigidity, no flank ecchymosis   MUSCLOSKELETAL: moves all extremities   PSYCH: A+O x 3, affect normal  NEUROLOGY: CN 2-12 are intact and symmetric; no gross sensory or motor deficits      LABS:                         10.4   12.70 )-----------( 246      ( 31 Aug 2023 06:21 )             30.9     08-31    131<L>  |  95<L>  |  17  ----------------------------<  99  4.2   |  22  |  0.87    Ca    9.2      31 Aug 2023 06:21  Mg     1.9     08-30    TPro  6.7  /  Alb  3.1<L>  /  TBili  1.4<H>  /  DBili  0.3  /  AST  85<H>  /  ALT  33  /  AlkPhos  64  08-31    PT/INR - ( 30 Aug 2023 21:49 )   PT: 19.0 sec;   INR: 1.84 ratio         PTT - ( 31 Aug 2023 06:21 )  PTT:29.4 sec  Urinalysis Basic - ( 31 Aug 2023 06:21 )    Color: x / Appearance: x / SG: x / pH: x  Gluc: 99 mg/dL / Ketone: x  / Bili: x / Urobili: x   Blood: x / Protein: x / Nitrite: x   Leuk Esterase: x / RBC: x / WBC x   Sq Epi: x / Non Sq Epi: x / Bacteria: x
Patient is a 88y old  Female who presents with a chief complaint of Tx for structural heart eval     ADDITIONAL REVIEW OF SYSTEMS: still with LLE cramping. Denies CP, SOB.     MEDICATIONS  (STANDING):  aspirin enteric coated 81 milliGRAM(s) Oral daily  atorvastatin 20 milliGRAM(s) Oral at bedtime  digoxin     Tablet 62.5 MICROGram(s) Oral daily  heparin  Infusion. 600 Unit(s)/Hr (6 mL/Hr) IV Continuous <Continuous>  levothyroxine 88 MICROGram(s) Oral daily  metoprolol succinate ER 25 milliGRAM(s) Oral daily  polyethylene glycol 3350 17 Gram(s) Oral daily    MEDICATIONS  (PRN):  acetaminophen     Tablet .. 650 milliGRAM(s) Oral every 6 hours PRN Temp greater or equal to 38C (100.4F), Mild Pain (1 - 3)  heparin   Injectable 4500 Unit(s) IV Push every 6 hours PRN For aPTT less than 40  heparin   Injectable 2000 Unit(s) IV Push every 6 hours PRN For aPTT between 40 - 57  melatonin 3 milliGRAM(s) Oral at bedtime PRN Insomnia      CAPILLARY BLOOD GLUCOSE        I&O's Summary    26 Aug 2023 07:01  -  27 Aug 2023 07:00  --------------------------------------------------------  IN: 900 mL / OUT: 800 mL / NET: 100 mL    27 Aug 2023 07:01  -  27 Aug 2023 13:25  --------------------------------------------------------  IN: 240 mL / OUT: 0 mL / NET: 240 mL      Vital Signs Last 24 Hrs  T(C): 36.4 (28 Aug 2023 11:36), Max: 36.7 (27 Aug 2023 19:27)  T(F): 97.6 (28 Aug 2023 11:36), Max: 98 (27 Aug 2023 19:27)  HR: 75 (28 Aug 2023 11:36) (75 - 91)  BP: 117/75 (28 Aug 2023 11:36) (107/67 - 128/77)  BP(mean): --  RR: 18 (28 Aug 2023 11:36) (16 - 18)  SpO2: 94% (28 Aug 2023 11:36) (94% - 97%)    Parameters below as of 28 Aug 2023 11:36  Patient On (Oxygen Delivery Method): room air      CONSTITUTIONAL: NAD  EYES: EOMI; conjunctiva and sclera clear  ENMT: MMM  NECK: Supple  RESPIRATORY: Normal respiratory effort; CTAB  CARDIOVASCULAR: RRR, no peripheral edema   ABDOMEN: Nontender to palpation, normoactive BS, no guarding/rigidity  MUSCLOSKELETAL:  no clubbing/cyanosis, no joint swelling or tenderness to palpation  PSYCH: A+O x 3, affect normal  NEUROLOGY: CN 2-12 are intact and symmetric; no gross sensory or motor deficits        LABS:                         11.4   9.17  )-----------( 254      ( 28 Aug 2023 06:12 )             33.9     08-28    128<L>  |  91<L>  |  20  ----------------------------<  106<H>  4.2   |  27  |  1.03    Ca    9.7      28 Aug 2023 06:13  Mg     2.1     08-27    TPro  7.6  /  Alb  3.8  /  TBili  1.4<H>  /  DBili  x   /  AST  39  /  ALT  26  /  AlkPhos  82  08-27    PT/INR - ( 28 Aug 2023 13:00 )   PT: 25.7 sec;   INR: 2.51 ratio         PTT - ( 28 Aug 2023 13:00 )  PTT:52.7 sec  Urinalysis Basic - ( 28 Aug 2023 06:13 )    Color: x / Appearance: x / SG: x / pH: x  Gluc: 106 mg/dL / Ketone: x  / Bili: x / Urobili: x   Blood: x / Protein: x / Nitrite: x   Leuk Esterase: x / RBC: x / WBC x   Sq Epi: x / Non Sq Epi: x / Bacteria: x
Patient is a 88y old  Female who presents with a chief complaint of Tx for structural heart eval     ADDITIONAL REVIEW OF SYSTEMS: still with LLE cramping. Denies CP, SOB.     MEDICATIONS  (STANDING):  aspirin enteric coated 81 milliGRAM(s) Oral daily  atorvastatin 20 milliGRAM(s) Oral at bedtime  digoxin     Tablet 62.5 MICROGram(s) Oral daily  heparin  Infusion. 600 Unit(s)/Hr (6 mL/Hr) IV Continuous <Continuous>  levothyroxine 88 MICROGram(s) Oral daily  metoprolol succinate ER 25 milliGRAM(s) Oral daily  polyethylene glycol 3350 17 Gram(s) Oral daily    MEDICATIONS  (PRN):  acetaminophen     Tablet .. 650 milliGRAM(s) Oral every 6 hours PRN Temp greater or equal to 38C (100.4F), Mild Pain (1 - 3)  heparin   Injectable 4500 Unit(s) IV Push every 6 hours PRN For aPTT less than 40  heparin   Injectable 2000 Unit(s) IV Push every 6 hours PRN For aPTT between 40 - 57  melatonin 3 milliGRAM(s) Oral at bedtime PRN Insomnia      CAPILLARY BLOOD GLUCOSE        I&O's Summary    26 Aug 2023 07:01  -  27 Aug 2023 07:00  --------------------------------------------------------  IN: 900 mL / OUT: 800 mL / NET: 100 mL    27 Aug 2023 07:01  -  27 Aug 2023 13:25  --------------------------------------------------------  IN: 240 mL / OUT: 0 mL / NET: 240 mL      Vital Signs Last 24 Hrs  T(C): 36.4 (28 Aug 2023 11:36), Max: 36.7 (27 Aug 2023 19:27)  T(F): 97.6 (28 Aug 2023 11:36), Max: 98 (27 Aug 2023 19:27)  HR: 75 (28 Aug 2023 11:36) (75 - 91)  BP: 117/75 (28 Aug 2023 11:36) (107/67 - 128/77)  BP(mean): --  RR: 18 (28 Aug 2023 11:36) (16 - 18)  SpO2: 94% (28 Aug 2023 11:36) (94% - 97%)    Parameters below as of 28 Aug 2023 11:36  Patient On (Oxygen Delivery Method): room air      CONSTITUTIONAL: NAD  EYES: EOMI; conjunctiva and sclera clear  ENMT: MMM  NECK: Supple  RESPIRATORY: Normal respiratory effort; CTAB  CARDIOVASCULAR: RRR, no peripheral edema   ABDOMEN: Nontender to palpation, normoactive BS, no guarding/rigidity  MUSCLOSKELETAL:  no clubbing/cyanosis, no joint swelling or tenderness to palpation  PSYCH: A+O x 3, affect normal  NEUROLOGY: CN 2-12 are intact and symmetric; no gross sensory or motor deficits    LABS:                         9.9    13.01 )-----------( 256      ( 30 Aug 2023 06:00 )             28.8     08-30    126<L>  |  90<L>  |  15  ----------------------------<  96  4.2   |  24  |  0.81    Ca    9.4      30 Aug 2023 06:05    TPro  7.1  /  Alb  3.3  /  TBili  1.1  /  DBili  0.2  /  AST  79<H>  /  ALT  29  /  AlkPhos  70  08-30    PT/INR - ( 29 Aug 2023 13:14 )   PT: 25.1 sec;   INR: 2.34 ratio         PTT - ( 30 Aug 2023 06:04 )  PTT:61.0 sec  Urinalysis Basic - ( 30 Aug 2023 06:05 )    Color: x / Appearance: x / SG: x / pH: x  Gluc: 96 mg/dL / Ketone: x  / Bili: x / Urobili: x   Blood: x / Protein: x / Nitrite: x   Leuk Esterase: x / RBC: x / WBC x   Sq Epi: x / Non Sq Epi: x / Bacteria: x      CARDIAC MARKERS ( 29 Aug 2023 03:10 )  x     / x     / 1269 U/L / x     / x    
Toi Lucero MD  Division of Hospital Medicine  Available on MS teams until 7pm  If no response or off-hours, page [phone]  -------------------------------------    Patient is a 88y old  Female who presents with a chief complaint of Tx for structural heart eval (27 Aug 2023 12:32)      SUBJECTIVE / OVERNIGHT EVENTS: none acute  ADDITIONAL REVIEW OF SYSTEMS: pt notes LLE cramping, worst with attempted movement. Daughter notes cramping has been ongoing since starting diuretics, thinks it was worse at [facility]SH prior to arrival. Pt also notes generalized weakness and orthostasis when trying to stand. Daughter very concerned about progressive deconditioning while underoing cardiac optimization.    MEDICATIONS  (STANDING):  aspirin enteric coated 81 milliGRAM(s) Oral daily  atorvastatin 20 milliGRAM(s) Oral at bedtime  digoxin     Tablet 62.5 MICROGram(s) Oral daily  heparin  Infusion. 600 Unit(s)/Hr (6 mL/Hr) IV Continuous <Continuous>  levothyroxine 88 MICROGram(s) Oral daily  metoprolol succinate ER 25 milliGRAM(s) Oral daily  polyethylene glycol 3350 17 Gram(s) Oral daily    MEDICATIONS  (PRN):  acetaminophen     Tablet .. 650 milliGRAM(s) Oral every 6 hours PRN Temp greater or equal to 38C (100.4F), Mild Pain (1 - 3)  heparin   Injectable 4500 Unit(s) IV Push every 6 hours PRN For aPTT less than 40  heparin   Injectable 2000 Unit(s) IV Push every 6 hours PRN For aPTT between 40 - 57  melatonin 3 milliGRAM(s) Oral at bedtime PRN Insomnia      CAPILLARY BLOOD GLUCOSE        I&O's Summary    26 Aug 2023 07:01  -  27 Aug 2023 07:00  --------------------------------------------------------  IN: 900 mL / OUT: 800 mL / NET: 100 mL    27 Aug 2023 07:01  -  27 Aug 2023 13:25  --------------------------------------------------------  IN: 240 mL / OUT: 0 mL / NET: 240 mL        PHYSICAL EXAM:  Vital Signs Last 24 Hrs  T(C): 36.7 (27 Aug 2023 05:34), Max: 36.7 (26 Aug 2023 20:41)  T(F): 98.1 (27 Aug 2023 05:34), Max: 98.1 (27 Aug 2023 05:34)  HR: 88 (27 Aug 2023 12:31) (79 - 90)  BP: 122/63 (27 Aug 2023 12:31) (115/76 - 137/68)  BP(mean): --  RR: 18 (27 Aug 2023 12:31) (18 - 18)  SpO2: 96% (27 Aug 2023 12:31) (95% - 97%)    Parameters below as of 27 Aug 2023 12:31  Patient On (Oxygen Delivery Method): nasal cannula  O2 Flow (L/min): 2    CONSTITUTIONAL: NAD  EYES: PERRLA; conjunctiva and sclera clear  ENMT: MMM  NECK: Supple  RESPIRATORY: Normal respiratory effort; CTAB  CARDIOVASCULAR: RRR, no JVD, no peripheral edema   ABDOMEN: Nontender to palpation, normoactive BS, no guarding/rigidity  MUSCLOSKELETAL:  no clubbing/cyanosis, no joint swelling or tenderness to palpation  PSYCH: A+O x 3, affect normal  NEUROLOGY: CN 2-12 are intact and symmetric; no gross sensory or motor deficits  SKIN: No rashes; no palpable lesions    LABS:                        12.3   8.01  )-----------( 253      ( 27 Aug 2023 06:59 )             37.0     08-27    133<L>  |  92<L>  |  19  ----------------------------<  100<H>  4.3   |  28  |  1.02    Ca    10.1      27 Aug 2023 06:57  Phos  3.3     08-26  Mg     2.1     08-27    TPro  7.6  /  Alb  3.8  /  TBili  1.4<H>  /  DBili  x   /  AST  39  /  ALT  26  /  AlkPhos  82  08-27    PT/INR - ( 26 Aug 2023 07:19 )   PT: 33.0 sec;   INR: 3.25 ratio         PTT - ( 27 Aug 2023 07:00 )  PTT:86.3 sec      Urinalysis Basic - ( 27 Aug 2023 06:57 )    Color: x / Appearance: x / SG: x / pH: x  Gluc: 100 mg/dL / Ketone: x  / Bili: x / Urobili: x   Blood: x / Protein: x / Nitrite: x   Leuk Esterase: x / RBC: x / WBC x   Sq Epi: x / Non Sq Epi: x / Bacteria: x          RADIOLOGY & ADDITIONAL TESTS:  Results Reviewed:   Imaging Personally Reviewed:  Electrocardiogram Personally Reviewed:    COORDINATION OF CARE:  Care Discussed with Consultants/Other Providers [Y/N]:  Prior or Outpatient Records Reviewed [Y/N]:  
Patient is a 88y old  Female who presents with a chief complaint of Tx for structural heart eval     ADDITIONAL REVIEW OF SYSTEMS: still with LLE cramping. Denies CP, SOB.     MEDICATIONS  (STANDING):  aspirin enteric coated 81 milliGRAM(s) Oral daily  atorvastatin 20 milliGRAM(s) Oral at bedtime  digoxin     Tablet 62.5 MICROGram(s) Oral daily  heparin  Infusion. 600 Unit(s)/Hr (6 mL/Hr) IV Continuous <Continuous>  levothyroxine 88 MICROGram(s) Oral daily  metoprolol succinate ER 25 milliGRAM(s) Oral daily  polyethylene glycol 3350 17 Gram(s) Oral daily    MEDICATIONS  (PRN):  acetaminophen     Tablet .. 650 milliGRAM(s) Oral every 6 hours PRN Temp greater or equal to 38C (100.4F), Mild Pain (1 - 3)  heparin   Injectable 4500 Unit(s) IV Push every 6 hours PRN For aPTT less than 40  heparin   Injectable 2000 Unit(s) IV Push every 6 hours PRN For aPTT between 40 - 57  melatonin 3 milliGRAM(s) Oral at bedtime PRN Insomnia      CAPILLARY BLOOD GLUCOSE        I&O's Summary    26 Aug 2023 07:01  -  27 Aug 2023 07:00  --------------------------------------------------------  IN: 900 mL / OUT: 800 mL / NET: 100 mL    27 Aug 2023 07:01  -  27 Aug 2023 13:25  --------------------------------------------------------  IN: 240 mL / OUT: 0 mL / NET: 240 mL      Vital Signs Last 24 Hrs  T(C): 36.4 (28 Aug 2023 11:36), Max: 36.7 (27 Aug 2023 19:27)  T(F): 97.6 (28 Aug 2023 11:36), Max: 98 (27 Aug 2023 19:27)  HR: 75 (28 Aug 2023 11:36) (75 - 91)  BP: 117/75 (28 Aug 2023 11:36) (107/67 - 128/77)  BP(mean): --  RR: 18 (28 Aug 2023 11:36) (16 - 18)  SpO2: 94% (28 Aug 2023 11:36) (94% - 97%)    Parameters below as of 28 Aug 2023 11:36  Patient On (Oxygen Delivery Method): room air      CONSTITUTIONAL: NAD  EYES: EOMI; conjunctiva and sclera clear  ENMT: MMM  NECK: Supple  RESPIRATORY: Normal respiratory effort; CTAB  CARDIOVASCULAR: RRR, no peripheral edema   ABDOMEN: Nontender to palpation, normoactive BS, no guarding/rigidity  MUSCLOSKELETAL:  no clubbing/cyanosis, no joint swelling or tenderness to palpation  PSYCH: A+O x 3, affect normal  NEUROLOGY: CN 2-12 are intact and symmetric; no gross sensory or motor deficits      LABS:                         10.7   11.84 )-----------( 260      ( 29 Aug 2023 03:09 )             31.0     08-29    126<L>  |  90<L>  |  16  ----------------------------<  109<H>  4.0   |  25  |  0.86    Ca    9.4      29 Aug 2023 03:10      PT/INR - ( 29 Aug 2023 13:14 )   PT: 25.1 sec;   INR: 2.34 ratio         PTT - ( 29 Aug 2023 13:14 )  PTT:71.8 sec  Urinalysis Basic - ( 29 Aug 2023 03:10 )    Color: x / Appearance: x / SG: x / pH: x  Gluc: 109 mg/dL / Ketone: x  / Bili: x / Urobili: x   Blood: x / Protein: x / Nitrite: x   Leuk Esterase: x / RBC: x / WBC x   Sq Epi: x / Non Sq Epi: x / Bacteria: x      CARDIAC MARKERS ( 29 Aug 2023 03:10 )  x     / x     / 1269 U/L / x     / x

## 2023-09-01 NOTE — PROGRESS NOTE ADULT - PROBLEM SELECTOR PLAN 1
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 Will hold for today given BP/Na trends. Plan for [facility] to better evaluate if she is a candidate for Mitral BITA     AVR - c/w heparin
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   Will need to restart lasix eventually - was getting 40mg IVP daily, (when increased from daily to BID 8/21 BP did not tolerate). Consider every other day.   - cardiology not aware of patient- reconsulted today. Dr. Lisker reported to cover Dr. Alonzo- will call his office for coverage  -Strict in's and out's, daily weights    Structural Heart Eval pending general cards eval     AVR - c/w heparin
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     Currently on sodium 2gm TID.   Na improving to 134 now.   Continue supplementation for now.
Hypotonic Hyponatremia ADH mediated likely 2' Hip Pain (U Na 50, Uosm 554).   Na admission 134; now 126.    Started on salt tabs 2gm BID.   Na remains low 126.   Increased to TID now.   Monitor Na.       Thank you for this consult.  Sage Ashby  Nephrology Fellow  Please contact me on TEAMS  After 5 pm please contact the on-call Fellow.
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     Started on 2g TID.   We had initially started furosemide 20mg daily. But since she has a retroperitoneal bleed, we will discontinue furosemide.  //  We will reassess sodium level and hemodynamics before resuming furosemide.     Discussed plans with primary team.
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   Will need to restart lasix eventually - was getting 40mg IVP daily, (when increased from daily to BID 8/21 BP did not tolerate). Consider every other day   - cardiology following   -Strict in's and out's, daily weights    Structural Heart Eval pending      AVR - c/w heparin
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 F/u structural cards recs      AVR - heparin gtt

## 2023-09-01 NOTE — PROGRESS NOTE ADULT - PROBLEM SELECTOR PLAN 7
Hgb 9.9 today. No s/s of acute bleeding at this time. No melena. On heparin gtt. F/u PM CBC, iron studies, CTAP r/o hematoma/RP
Creatinine stable  Monitor daily in setting of IV lasix
CT Chest:  New interlobular septal thickening and bilateral patchy pulmonary opacities  -C/w Levaquin (D1: 8/31)   -F/u BCx, sputum cx       #Endometrial thickening   CTAP: Thickening of the endometrial complex. Recommend further evaluation with pelvic ultrasound.   -outpt f/u
Creatinine stable  Monitor daily in setting of IV lasix
Creatinine stable  Monitor daily in setting of IV lasix
Elevated Bilirubin with likely component of congestive hepatopathy  History of Acute Cholecystitis  -RUQ u/s showed cholelithiasis without evidence of cholecystitis or biliary obstruction, patient is asymptomatic  -Elevated total bili appears chronic, improved with diuresis  -Monitor for now

## 2023-09-01 NOTE — PROGRESS NOTE ADULT - PROBLEM SELECTOR PROBLEM 6
Elevated bilirubin
Hypothyroidism
Hypothyroidism

## 2023-09-01 NOTE — CONSULT NOTE ADULT - PROBLEM SELECTOR RECOMMENDATION 6
- Geriatrics and Palliative Medicine Team will continue to follow with you, plan for next f/u visit on Tuesday. Family provided with my contact information should any questions arise.    Sandy Koch MD  [facility] Team Consults  Please call if we can be of assistance, [phone]

## 2023-09-01 NOTE — PROVIDER CONTACT NOTE (CHANGE IN STATUS NOTIFICATION) - SITUATION
pt s/p RP angio/embo. Pt tachycardic, hypotensive, desating, and complaining of new onset chest pain
RRT called for symptomatic hypotension

## 2023-09-01 NOTE — PROGRESS NOTE ADULT - PROBLEM SELECTOR PLAN 5
TSH elevated at [facility] however in setting of acute illness  c/w synthroid 88 and will need repeat TSH as outpatient
c/w ASA, lipitor, bb
TSH elevated at [facility] however in setting of acute illness  c/w synthroid 88 and will need repeat TSH as outpatient
c/w ASA, lipitor, bb

## 2023-09-01 NOTE — CONSULT NOTE ADULT - SUBJECTIVE AND OBJECTIVE BOX
HPI:  88 year old female with past medical  A.fib and Mechanical AVR ([facility]) on coumadin, CAD, HTN/HLD, hypothyroidism, history of cholelithiasis/acute cholecystitis (managed conservatively 8/2022), who initially presents from home with few week history of worsening shortness of breath. At [place] found to have acute on chronic diastolic heart failure due to severe MR/TR, NSTEMI type 2. Managed with IV lasix - BP did not tolerate bid, tx here on 40mg IV daily. Additionally, patient with b/l pleural effusions - mild loculated R effusion + 1.4cm paratracheal LN - seen by pulm - however family declined thoracentesis due to transfer to NS. Per pulm low suspicion for malignancy and most likely related to HF, recommended continued follow up with pulm here. Patient also with persistent aflutter with RVR, was initiated on digoxin, had supratherapeutic levels, held, now restarted. Was also bridged to coumadin, now on hold on heparin gtt in preparation for potential intervention by str heart..   TTE 8/20 showed EF 60-65%, severe MR, severe TR, moderate pulm HTN.  Here - patient saturating 99% on 2L, comfortable.    At baseline Patient lives at home alone and is independent.    (26 Aug 2023 08:21)    PERTINENT PM/SXH:   S/P AVR    Atrial fibrillation    Hypothyroidism      History of appendectomy    S/P AVR      FAMILY HISTORY:    Family Hx substance abuse [ ]yes [ ]no  ITEMS NOT CHECKED ARE NOT PRESENT    SOCIAL HISTORY:   Significant other/partner[ ]  Children[ ]  Tenriism/Spirituality:  Substance hx:  [ ]   Tobacco hx:  [ ]   Alcohol hx: [ ]   Home Opioid hx:  [ ] I-Stop Reference No:  Living Situation: [ ]Home  [ ]Long term care  [ ]Rehab [ ]Other    ADVANCE DIRECTIVES:    DNR/MOLST  [ ]  Living Will  [ ]   DECISION MAKER(s):  [ ] Health Care Proxy(s)  [ ] Surrogate(s)  [ ] Guardian           Name(s): Phone Number(s):    BASELINE (I)ADL(s) (prior to admission):  [place]: [ ]Total  [ ] Moderate [ ]Dependent    Allergies    penicillin (Rash)  sulfa drugs (Rash)    Intolerances    MEDICATIONS  (STANDING):  acetaminophen     Tablet .. 650 milliGRAM(s) Oral every 6 hours  heparin  Infusion 600 Unit(s)/Hr (6.5 mL/Hr) IV Continuous <Continuous>  levoFLOXacin IVPB      levothyroxine 88 MICROGram(s) Oral daily  lidocaine   4% Patch 1 Patch Transdermal daily  metoprolol tartrate 25 milliGRAM(s) Oral two times a day  multivitamin 1 Tablet(s) Oral daily  polyethylene glycol 3350 17 Gram(s) Oral daily  senna 2 Tablet(s) Oral at bedtime  sodium chloride 2 Gram(s) Oral three times a day    MEDICATIONS  (PRN):  cyclobenzaprine 5 milliGRAM(s) Oral three times a day PRN Muscle Spasm  melatonin 3 milliGRAM(s) Oral at bedtime PRN Insomnia  morphine  - Injectable 2 milliGRAM(s) IV Push every 6 hours PRN Moderate Pain (4 - 6)    PRESENT SYMPTOMS: [ ]Unable to self-report  [ ] CPOT [ ] PAINADs [ ] RDOS  Source if other than patient:  [ ]Family   [ ]Team     Pain: [ ]yes [ ]no  QOL impact -   Location -                    Aggravating factors -  Quality -  Radiation -  Timing-  Severity (0-10 scale):  Minimal acceptable level (0-10 scale):     Dyspnea:                           [ ]Mild [ ]Moderate [ ]Severe  Anxiety:                             [ ]Mild [ ]Moderate [ ]Severe  Fatigue:                             [ ]Mild [ ]Moderate [ ]Severe  Nausea:                             [ ]Mild [ ]Moderate [ ]Severe  Loss of appetite:              [ ]Mild [ ]Moderate [ ]Severe  Constipation:                    [ ]Mild [ ]Moderate [ ]Severe    PCSSQ[Palliative Care Spiritual Screening Question]   Severity (0-10):  Score of 4 or > indicate consideration of Chaplaincy referral.  Chaplaincy Referral: [ ] yes [ ] refused [ ] following [ ] Deferred     Caregiver [place]? : [ ] yes [ ] no [ ] Deferred [ ] Declined             Social work referral [ ] Patient & Family Centered Care Referral [ ]     Anticipatory Grief present?:  [ ] yes [ ] no  [ ] Deferred                  Social work referral [ ] Chaplaincy Referral[ ]      Other Symptoms:  [ ]All other review of systems negative     Palliative Performance Status Version 2:         %    http://npcrc.org/files/news/palliative_performance_scale_ppsv2.pdf    PHYSICAL EXAM:  Vital Signs Last 24 Hrs  T(C): 36.3 (01 Sep 2023 12:15), Max: 36.9 (31 Aug 2023 17:54)  T(F): 97.4 (01 Sep 2023 12:15), Max: 98.4 (31 Aug 2023 17:54)  HR: 119 (01 Sep 2023 12:15) (85 - 134)  BP: 99/61 (01 Sep 2023 12:15) (76/56 - 121/72)  BP(mean): 74 (31 Aug 2023 16:28) (63 - 75)  RR: 18 (01 Sep 2023 12:15) (12 - 24)  SpO2: 98% (01 Sep 2023 12:15) (82% - 99%)    Parameters below as of 01 Sep 2023 12:15  Patient On (Oxygen Delivery Method): nasal cannula  O2 Flow (L/min): 6   I&O's Summary    31 Aug 2023 07:01  -  01 Sep 2023 07:00  --------------------------------------------------------  IN: 0 mL / OUT: 1000 mL / NET: -1000 mL      GENERAL: [ ]Cachexia    [ ]Alert  [ ]Oriented x   [ ]Lethargic  [ ]Unarousable  [ ]Verbal  [ ]Non-Verbal  Behavioral:   [ ] Anxiety  [ ] Delirium [ ] Agitation [ ] Other  HEENT:  [ ]Normal   [ ]Dry mouth   [ ]ET Tube/Trach  [ ]Oral lesions  PULMONARY:   [ ]Clear [ ]Tachypnea  [ ]Audible excessive secretions   [ ]Rhonchi        [ ]Right [ ]Left [ ]Bilateral  [ ]Crackles        [ ]Right [ ]Left [ ]Bilateral  [ ]Wheezing     [ ]Right [ ]Left [ ]Bilateral  [ ]Diminished breath sounds [ ]right [ ]left [ ]bilateral  CARDIOVASCULAR:    [ ]Regular [ ]Irregular [ ]Tachy  [ ]Donaldo [ ]Murmur [ ]Other  GASTROINTESTINAL:  [ ]Soft  [ ]Distended   [ ]+BS  [ ]Non tender [ ]Tender  [ ]Other [ ]PEG [ ]OGT/ NGT  Last BM:  GENITOURINARY:  [ ]Normal [ ] Incontinent   [ ]Oliguria/Anuria   [ ]Rios  MUSCULOSKELETAL:   [ ]Normal   [ ]Weakness  [ ]Bed/Wheelchair bound [ ]Edema  NEUROLOGIC:   [ ]No focal deficits  [ ]Cognitive impairment  [ ]Dysphagia [ ]Dysarthria [ ]Paresis [ ]Other   SKIN:   [ ]Normal  [ ]Rash  [ ]Other  [ ]Pressure ulcer(s)       Present on admission [ ]y [ ]n    CRITICAL CARE:  [ ] Shock Present  [ ]Septic [ ]Cardiogenic [ ]Neurologic [ ]Hypovolemic  [ ]  Vasopressors [ ]  Inotropes   [ ]Respiratory failure present [ ]Mechanical ventilation [ ]Non-invasive ventilatory support [ ]High flow    [ ]Acute  [ ]Chronic [ ]Hypoxic  [ ]Hypercarbic [ ]Other  [ ]Other organ failure     LABS:                        10.3   14.51 )-----------( 344      ( 01 Sep 2023 12:33 )             31.3   09-01    134<L>  |  97  |  24<H>  ----------------------------<  114<H>  4.3   |  21<L>  |  1.12    Ca    9.1      01 Sep 2023 06:33  Mg     1.9     08-30    TPro  6.7  /  Alb  3.0<L>  /  TBili  1.3<H>  /  DBili  x   /  AST  193<H>  /  ALT  39  /  AlkPhos  66  09-01  PT/INR - ( 31 Aug 2023 16:40 )   PT: 16.8 sec;   INR: 1.55 ratio         PTT - ( 01 Sep 2023 06:33 )  PTT:36.5 sec    Urinalysis Basic - ( 01 Sep 2023 06:33 )    Color: x / Appearance: x / SG: x / pH: x  Gluc: 114 mg/dL / Ketone: x  / Bili: x / Urobili: x   Blood: x / Protein: x / Nitrite: x   Leuk Esterase: x / RBC: x / WBC x   Sq Epi: x / Non Sq Epi: x / Bacteria: x      RADIOLOGY & ADDITIONAL STUDIES:    PROTEIN CALORIE MALNUTRITION PRESENT: [ ]mild [ ]moderate [ ]severe [ ]underweight [ ]morbid obesity  https://www.andeal.org/vault/8770/web/files/ONC/Table_Clinical%20Characteristics%20to%20Document%20Malnutrition-White%20JV%20et%20al%202012.pdf    Height (cm): 162.6 (08-19-23 @ 11:19), 162.6 (11-22-22 @ 19:37), 160 (09-09-22 @ 14:45)  Weight (kg): 56.7 (08-26-23 @ 07:47), 56.7 (08-19-23 @ 11:19), 56.7 (11-22-22 @ 19:37)  BMI (kg/m2): 21.4 (08-26-23 @ 07:47), 21.4 (08-19-23 @ 11:19), 21.4 (11-22-22 @ 19:37)    [ ]PPSV2 < or = to 30% [ ]significant weight loss  [ ]poor nutritional intake  [ ]anasarca[ ]Artificial Nutrition      Other REFERRALS:  [ ]Hospice  [ ]Child Life  [ ]Social Work  [ ]Case management [ ]Holistic Therapy  HPI:  88F with Afib and Mechanical AVR ([facility]) on coumadin, CAD, HTN/HLD, hypothyroidism who initially presents from home with few week history of worsening shortness of breath. At [place] found to have acute on chronic diastolic heart failure due to severe MR/TR, NSTEMI type 2, managed with IV lasix though limited by hypotension. Additionally, patient with bilat pleural effusions with 1.4cm paratracheal LN with plans for f/u outpatient imaging in 6 weeks. Pt was transferred to [facility] for structural heart evaluation. Hospital course c/b RP bleed s/p IR embolization. Geriatrics and Palliative Medicine Team is consulted at family request for support and guidance on GOC.     Patient seen in her room this morning, son at bedside. She is fatigued but fully participatory with conversation. She denies any discomfort, complains of poor appetite. Please see GOC section below for discussion details.     PERTINENT PM/SXH:   S/P AVR    Atrial fibrillation    Hypothyroidism      History of appendectomy    S/P AVR      FAMILY HISTORY:    Family Hx substance abuse [ ]yes [ ]no  ITEMS NOT CHECKED ARE NOT PRESENT    SOCIAL HISTORY:   Significant other/partner[ ]  Children[x ]  Yazidi/Spirituality:  Substance hx:  [ ]   Tobacco hx:  [ ]   Alcohol hx: [ ]   Home Opioid hx:  [ ] I-Stop Reference No:  Living Situation: [ x]Home  [ ]Long term care  [ ]Rehab [ ]Other    ADVANCE DIRECTIVES:    DNR/MOLST  [ ]  Living Will  [ ]   DECISION MAKER(s):  [ ] Health Care Proxy(s)  [x ] Surrogate(s)  [ ] Guardian           Name(s): Phone Number(s): dtr Sandrita     BASELINE (I)ADL(s) (prior to admission):  [place]: [x ]Total  [ ] Moderate [ ]Dependent    Allergies    penicillin (Rash)  sulfa drugs (Rash)    Intolerances    MEDICATIONS  (STANDING):  acetaminophen     Tablet .. 650 milliGRAM(s) Oral every 6 hours  heparin  Infusion 600 Unit(s)/Hr (6.5 mL/Hr) IV Continuous <Continuous>  levoFLOXacin IVPB      levothyroxine 88 MICROGram(s) Oral daily  lidocaine   4% Patch 1 Patch Transdermal daily  metoprolol tartrate 25 milliGRAM(s) Oral two times a day  multivitamin 1 Tablet(s) Oral daily  polyethylene glycol 3350 17 Gram(s) Oral daily  senna 2 Tablet(s) Oral at bedtime  sodium chloride 2 Gram(s) Oral three times a day    MEDICATIONS  (PRN):  cyclobenzaprine 5 milliGRAM(s) Oral three times a day PRN Muscle Spasm  melatonin 3 milliGRAM(s) Oral at bedtime PRN Insomnia  morphine  - Injectable 2 milliGRAM(s) IV Push every 6 hours PRN Moderate Pain (4 - 6)    PRESENT SYMPTOMS: [ ]Unable to self-report  [ ] CPOT [ ] PAINADs [ ] RDOS  Source if other than patient:  [ ]Family   [ ]Team     Pain: [ ]yes [ x]no  QOL impact -   Location -                    Aggravating factors -  Quality -  Radiation -  Timing-  Severity (0-10 scale):  Minimal acceptable level (0-10 scale):     Dyspnea:                           [ ]Mild [ ]Moderate [ ]Severe  Anxiety:                             [ ]Mild [ ]Moderate [ ]Severe  Fatigue:                             [ ]Mild [x ]Moderate [ ]Severe  Nausea:                             [ ]Mild [ ]Moderate [ ]Severe  Loss of appetite:              [ ]Mild [ ]Moderate [x ]Severe  Constipation:                    [ ]Mild [ ]Moderate [ ]Severe    PCSSQ[Palliative Care Spiritual Screening Question]   Severity (0-10):  Score of 4 or > indicate consideration of Chaplaincy referral.  Chaplaincy Referral: [ ] yes [ ] refused [ ] following [ x] Deferred     Caregiver [place]? : [ ] yes [x ] no [ ] Deferred [ ] Declined             Social work referral [ ] Patient & Family Centered Care Referral [ ]     Anticipatory Grief present?:  [ ] yes [x ] no  [ ] Deferred                  Social work referral [ ] Chaplaincy Referral[ ]      Other Symptoms:  [x ]All other review of systems negative     Palliative Performance Status Version 2:    50     %    http://npcrc.org/files/news/palliative_performance_scale_ppsv2.pdf    PHYSICAL EXAM:  Vital Signs Last 24 Hrs  T(C): 36.3 (01 Sep 2023 12:15), Max: 36.9 (31 Aug 2023 17:54)  T(F): 97.4 (01 Sep 2023 12:15), Max: 98.4 (31 Aug 2023 17:54)  HR: 119 (01 Sep 2023 12:15) (85 - 134)  BP: 99/61 (01 Sep 2023 12:15) (76/56 - 121/72)  BP(mean): 74 (31 Aug 2023 16:28) (63 - 75)  RR: 18 (01 Sep 2023 12:15) (12 - 24)  SpO2: 98% (01 Sep 2023 12:15) (82% - 99%)    Parameters below as of 01 Sep 2023 12:15  Patient On (Oxygen Delivery Method): nasal cannula  O2 Flow (L/min): 6   I&O's Summary    31 Aug 2023 07:01  -  01 Sep 2023 07:00  --------------------------------------------------------  IN: 0 mL / OUT: 1000 mL / NET: -1000 mL      GENERAL: [ ]Cachexia    [x ]Alert  [x ]Oriented x 3  [ ]Lethargic  [ ]Unarousable  [ x]Verbal  [ ]Non-Verbal  Behavioral:   [ ] Anxiety  [ ] Delirium [ ] Agitation [ ] Other  HEENT:  [x ]Normal   [ ]Dry mouth   [ ]ET Tube/Trach  [ ]Oral lesions  PULMONARY:   [ ]Clear [ ]Tachypnea  [ ]Audible excessive secretions   [ ]Rhonchi        [ ]Right [ ]Left [ ]Bilateral  [ ]Crackles        [ ]Right [ ]Left [ ]Bilateral  [ ]Wheezing     [ ]Right [ ]Left [ ]Bilateral  [x ]Diminished breath sounds [ ]right [ ]left [x ]bilateral  CARDIOVASCULAR:    [ ]Regular [x ]Irregular [ ]Tachy  [ ]Donaldo [ ]Murmur [ ]Other  GASTROINTESTINAL:  [ x]Soft  [ ]Distended   [x ]+BS  [x ]Non tender [ ]Tender  [ ]Other [ ]PEG [ ]OGT/ NGT  Last BM:  GENITOURINARY:  [x ]Normal [ ] Incontinent   [ ]Oliguria/Anuria   [ ]Rios  MUSCULOSKELETAL:   [ ]Normal   [x ]Weakness  [ ]Bed/Wheelchair bound [ ]Edema  NEUROLOGIC:   [ x]No focal deficits  [ ]Cognitive impairment  [ ]Dysphagia [ ]Dysarthria [ ]Paresis [ ]Other   SKIN:   [x ]Normal  [ ]Rash  [ ]Other  [ ]Pressure ulcer(s)       Present on admission [ ]y [ ]n    CRITICAL CARE:  [ ] Shock Present  [ ]Septic [ ]Cardiogenic [ ]Neurologic [ ]Hypovolemic  [ ]  Vasopressors [ ]  Inotropes   [ ]Respiratory failure present [ ]Mechanical ventilation [ ]Non-invasive ventilatory support [ ]High flow    [ ]Acute  [ ]Chronic [ ]Hypoxic  [ ]Hypercarbic [ ]Other  [ ]Other organ failure     LABS:                        10.3   14.51 )-----------( 344      ( 01 Sep 2023 12:33 )             31.3   09-01    134<L>  |  97  |  24<H>  ----------------------------<  114<H>  4.3   |  21<L>  |  1.12    Ca    9.1      01 Sep 2023 06:33  Mg     1.9     08-30    TPro  6.7  /  Alb  3.0<L>  /  TBili  1.3<H>  /  DBili  x   /  AST  193<H>  /  ALT  39  /  AlkPhos  66  09-01  PT/INR - ( 31 Aug 2023 16:40 )   PT: 16.8 sec;   INR: 1.55 ratio         PTT - ( 01 Sep 2023 06:33 )  PTT:36.5 sec    Urinalysis Basic - ( 01 Sep 2023 06:33 )    Color: x / Appearance: x / SG: x / pH: x  Gluc: 114 mg/dL / Ketone: x  / Bili: x / Urobili: x   Blood: x / Protein: x / Nitrite: x   Leuk Esterase: x / RBC: x / WBC x   Sq Epi: x / Non Sq Epi: x / Bacteria: x      RADIOLOGY & ADDITIONAL STUDIES:    < from: CT Abdomen and Pelvis No Cont (08.31.23 @ 17:12) >  IMPRESSION:    CHEST:  *  New interlobular septal thickening and bilateral patchy pulmonary   opacities, compatible with pulmonary edema. A superimposed   infectious/inflammatory process is not excluded.  *  New small bilateral pleural effusions.    ABDOMEN AND PELVIS:  *  No significant change in size of a left retroperitoneal hematoma.   Unable to assess for active hemorrhage in the absence of intravenous   contrast.  *  Thickening of the endometrial complex. Recommend further evaluation   with pelvic ultrasound.  *  Bilaterally persistent nephrograms, which may be seen with acute   tubular necrosis/renal dysfunction.        --- End of Report ---    < end of copied text >      PROTEIN CALORIE MALNUTRITION PRESENT: [ ]mild [ ]moderate [ ]severe [ ]underweight [ ]morbid obesity  https://www.andeal.org/vault/2440/web/files/ONC/Table_Clinical%20Characteristics%20to%20Document%20Malnutrition-White%20JV%20et%20al%202012.pdf    Height (cm): 162.6 (08-19-23 @ 11:19), 162.6 (11-22-22 @ 19:37), 160 (09-09-22 @ 14:45)  Weight (kg): 56.7 (08-26-23 @ 07:47), 56.7 (08-19-23 @ 11:19), 56.7 (11-22-22 @ 19:37)  BMI (kg/m2): 21.4 (08-26-23 @ 07:47), 21.4 (08-19-23 @ 11:19), 21.4 (11-22-22 @ 19:37)    [ ]PPSV2 < or = to 30% [ ]significant weight loss  [ ]poor nutritional intake  [ ]anasarca[ ]Artificial Nutrition      Other REFERRALS:  [ ]Hospice  [ ]Child Life  [ ]Social Work  [ ]Case management [ ]Holistic Therapy

## 2023-09-01 NOTE — CONSULT NOTE ADULT - CONSULT REASON
support and GOC
Mitral Regurgitation
Second opinion
Hematoma
Afib
Hyponatremia
chf
Pleural effusion
Rehabilitation consult

## 2023-09-02 NOTE — CHART NOTE - NSCHARTNOTESELECT_GEN_ALL_CORE
Hyponatremia/Event Note
Interventional Radiology/Event Note
Death Note/Event Note
Event Note
Rapid Response/Event Note

## 2023-09-02 NOTE — DISCHARGE NOTE FOR THE EXPIRED PATIENT - HOSPITAL COURSE
Problem/Plan - 1:  ·  Problem: CHF due to valvular disease.   ·  Plan: Acute CHF 2/2 Severe MR/TR  Elevated Troponin likely NSTEMI type 2 in setting of afib with RVR and acute on chronic diastolic heart failure  - TTE 8/20 with EF 60-65%, severe MR and TR, moderate pulm HTN  - Hold lasix for today given soft BP  - cardiology following   - Strict in's and out's, daily weights    Structural Heart Eval - Eventual plan for [facility] to better evaluate if she is a candidate for Mitral BITA. F/u structural cards recs      AVR - heparin gtt.     Problem/Plan - 2:  ·  Problem: Atrial fibrillation and flutter.   ·  Plan: Currently in afib   Monitor on tele  on heparin gtt  c/w metoprolol 25mg BID  HR poorly controlled - f/u cards recs     Mechanical AVR - heparin gtt.     Problem/Plan - 3:  ·  Problem: Anemia.   ·  Plan: #Acute blood loss anemia   #RP bleed   Drop in Hgb while on heparin gtt. CTA AP:  left retroperitoneal hematoma with predominant intramuscular component within the iliacus muscle. left psoas intramuscular hematoma with an arterially enhancing focus, suggestive of active hemorrhage.   -S/p 1u pRBC  -S/p embolization with coil w/ IR 8/31. Per IR, ok to resume AC.   -Started back on heparin gtt overnight; closely monitor CBC. CBC q6h, coags.     Problem/Plan - 4:  ·  Problem: Loculated pleural effusion.   ·  Plan: -CT showed mildly loculated moderate right pleural effusion, small layering left pleural effusion, atelectasis/consolidation of right middle lobe, basilar right lower lobe and anterobasilar left lower lobe; mildly enlarged R lower paratracheal lymph node 1.4cm   Seen by pulm at  -   -Repeat CXR 8/21 with improvement  Per  chart - pulm d/w daughter and patient on 8/23 that b.l pl effusions likely cardiogenic in origin and that her breathing now better with diuretics suggestive of cardiogenic in origin, Discussed having small loculation on CT is a non specific finding in a patient who had open heart surgery, however with having enlarged lymph node and partial loculated pleural effusion together on same side can be suggestive of underlying process's like cancer being present. Probability is small-moderate risk and would need follow up.  Discussed 2 paths forward thoracentesis while at  but will need to be off a/c vs repeat imaging in few weeks  Now transferred to [facility] - evaluated by pulm for possible thoracentesis. Per pulm: Bedside ultrasound showed minimal effusion present that would not be amenable to thoracentesis.  Patient will need follow-up Chest CT 6 weeks from the most recent.     Problem/Plan - 5:  ·  Problem: CAD (coronary artery disease).   ·  Plan: c/w ASA, lipitor, bb.     Problem/Plan - 6:  ·  Problem: Hypothyroidism.   ·  Plan: TSH elevated at  however in setting of acute illness  c/w synthroid 88 and will need repeat TSH as outpatient.     Problem/Plan - 7:  ·  Problem: PNA (pneumonia).   ·  Plan: CT Chest:  New interlobular septal thickening and bilateral patchy pulmonary opacities  -C/w Levaquin (D1: 8/31)   -F/u BCx, sputum cx     #Endometrial thickening   CTAP: Thickening of the endometrial complex. Recommend further evaluation with pelvic ultrasound.   -outpt f/u.     Problem/Plan - 8:  ·  Problem: Elevated bilirubin.   ·  Plan: Elevated Bilirubin with likely component of congestive hepatopathy  History of Acute Cholecystitis  -RUQ u/s showed cholelithiasis without evidence of cholecystitis or biliary obstruction, patient is asymptomatic  -Elevated total bili appears chronic, improved with diuresis  -Monitor for now.     Problem/Plan - 9:  ·  Problem: Stage 3 chronic kidney disease.   ·  Plan: Creatinine stable  Monitor BMP.     Problem/Plan - 10:  ·  Problem: Hyponatremia.   ·  Plan; Hypotonic Hyponatremia ADH mediated likely 2/2 pain (U Na 50, Uosm 554).   -Renal following  -Salt tabs 2gm TID   -Monitor BMP.    RRT on 8/31 and 9/1. Patient with poor prognosis. Was made DNR/DNI w/ Comfort Measures on 9/1.

## 2023-09-02 NOTE — CHART NOTE - NSCHARTNOTEFT_GEN_A_CORE
DEATH NOTE    Called to bedside to evaluate the patient for unresponsiveness     On physical exam, patient did not respond to verbal or noxious stimuli.  No spontaneous respirations.  Absent heart and breath sounds.  Absent radial and carotid pulses.   Pupils are fixed and dilated, no corneal reflex. Patient pronounced dead at 04:03. Hospitalist in Charge overnight made aware. Daughter, Sandrita, at bedside.     Vianey Edward PA-C  Department of Medicine   [employer]

## 2023-09-04 LAB
CULTURE RESULTS: SIGNIFICANT CHANGE UP
CULTURE RESULTS: SIGNIFICANT CHANGE UP
SPECIMEN SOURCE: SIGNIFICANT CHANGE UP
SPECIMEN SOURCE: SIGNIFICANT CHANGE UP

## 2023-09-06 ENCOUNTER — APPOINTMENT (OUTPATIENT)
Dept: FAMILY MEDICINE | Facility: CLINIC | Age: 88
End: 2023-09-06

## 2023-09-13 ENCOUNTER — APPOINTMENT (OUTPATIENT)
Dept: OBGYN | Facility: CLINIC | Age: 88
End: 2023-09-13

## 2023-09-22 ENCOUNTER — APPOINTMENT (OUTPATIENT)
Dept: ORTHOPEDIC SURGERY | Facility: CLINIC | Age: 88
End: 2023-09-22

## 2023-11-08 NOTE — PATIENT PROFILE ADULT - NSPROPTRIGHTREPNAME_GEN_A__NUR
Salt water gargles, ibuprofen or naproxen for pain. Rest and drink plenty of fluids. May buy over the counter Chloraseptic Lozenges or spray for severe pain.    May try prednisone (steroid). Discontinue if severe side effects - may cause anxiety, insomnia, palpitations and fluid retention.        
daughter

## 2023-11-13 PROCEDURE — 87640 STAPH A DNA AMP PROBE: CPT

## 2023-11-13 PROCEDURE — 97161 PT EVAL LOW COMPLEX 20 MIN: CPT

## 2023-11-13 PROCEDURE — 80162 ASSAY OF DIGOXIN TOTAL: CPT

## 2023-11-13 PROCEDURE — 93308 TTE F-UP OR LMTD: CPT

## 2023-11-13 PROCEDURE — C1887: CPT

## 2023-11-13 PROCEDURE — 83540 ASSAY OF IRON: CPT

## 2023-11-13 PROCEDURE — 36245 INS CATH ABD/L-EXT ART 1ST: CPT | Mod: XS

## 2023-11-13 PROCEDURE — 84540 ASSAY OF URINE/UREA-N: CPT

## 2023-11-13 PROCEDURE — 83605 ASSAY OF LACTIC ACID: CPT

## 2023-11-13 PROCEDURE — 85027 COMPLETE CBC AUTOMATED: CPT

## 2023-11-13 PROCEDURE — 36430 TRANSFUSION BLD/BLD COMPNT: CPT

## 2023-11-13 PROCEDURE — 80076 HEPATIC FUNCTION PANEL: CPT

## 2023-11-13 PROCEDURE — 85610 PROTHROMBIN TIME: CPT

## 2023-11-13 PROCEDURE — 82550 ASSAY OF CK (CPK): CPT

## 2023-11-13 PROCEDURE — 71045 X-RAY EXAM CHEST 1 VIEW: CPT

## 2023-11-13 PROCEDURE — 80048 BASIC METABOLIC PNL TOTAL CA: CPT

## 2023-11-13 PROCEDURE — 85018 HEMOGLOBIN: CPT

## 2023-11-13 PROCEDURE — 36247 INS CATH ABD/L-EXT ART 3RD: CPT

## 2023-11-13 PROCEDURE — 83615 LACTATE (LD) (LDH) ENZYME: CPT

## 2023-11-13 PROCEDURE — 82330 ASSAY OF CALCIUM: CPT

## 2023-11-13 PROCEDURE — 86900 BLOOD TYPING SEROLOGIC ABO: CPT

## 2023-11-13 PROCEDURE — C8929: CPT

## 2023-11-13 PROCEDURE — C1769: CPT

## 2023-11-13 PROCEDURE — 87040 BLOOD CULTURE FOR BACTERIA: CPT

## 2023-11-13 PROCEDURE — P9016: CPT

## 2023-11-13 PROCEDURE — 73522 X-RAY EXAM HIPS BI 3-4 VIEWS: CPT

## 2023-11-13 PROCEDURE — 83935 ASSAY OF URINE OSMOLALITY: CPT

## 2023-11-13 PROCEDURE — 83735 ASSAY OF MAGNESIUM: CPT

## 2023-11-13 PROCEDURE — 86850 RBC ANTIBODY SCREEN: CPT

## 2023-11-13 PROCEDURE — 74177 CT ABD & PELVIS W/CONTRAST: CPT

## 2023-11-13 PROCEDURE — 82248 BILIRUBIN DIRECT: CPT

## 2023-11-13 PROCEDURE — 71250 CT THORAX DX C-: CPT

## 2023-11-13 PROCEDURE — 83930 ASSAY OF BLOOD OSMOLALITY: CPT

## 2023-11-13 PROCEDURE — 93005 ELECTROCARDIOGRAM TRACING: CPT

## 2023-11-13 PROCEDURE — 82728 ASSAY OF FERRITIN: CPT

## 2023-11-13 PROCEDURE — 85025 COMPLETE CBC W/AUTO DIFF WBC: CPT

## 2023-11-13 PROCEDURE — 83880 ASSAY OF NATRIURETIC PEPTIDE: CPT

## 2023-11-13 PROCEDURE — 87641 MR-STAPH DNA AMP PROBE: CPT

## 2023-11-13 PROCEDURE — 85045 AUTOMATED RETICULOCYTE COUNT: CPT

## 2023-11-13 PROCEDURE — 82435 ASSAY OF BLOOD CHLORIDE: CPT

## 2023-11-13 PROCEDURE — 82962 GLUCOSE BLOOD TEST: CPT

## 2023-11-13 PROCEDURE — 83550 IRON BINDING TEST: CPT

## 2023-11-13 PROCEDURE — 84484 ASSAY OF TROPONIN QUANT: CPT

## 2023-11-13 PROCEDURE — 85730 THROMBOPLASTIN TIME PARTIAL: CPT

## 2023-11-13 PROCEDURE — 86901 BLOOD TYPING SEROLOGIC RH(D): CPT

## 2023-11-13 PROCEDURE — C1889: CPT

## 2023-11-13 PROCEDURE — 84100 ASSAY OF PHOSPHORUS: CPT

## 2023-11-13 PROCEDURE — 36415 COLL VENOUS BLD VENIPUNCTURE: CPT

## 2023-11-13 PROCEDURE — 37244 VASC EMBOLIZE/OCCLUDE BLEED: CPT

## 2023-11-13 PROCEDURE — 84132 ASSAY OF SERUM POTASSIUM: CPT

## 2023-11-13 PROCEDURE — 85014 HEMATOCRIT: CPT

## 2023-11-13 PROCEDURE — 82803 BLOOD GASES ANY COMBINATION: CPT

## 2023-11-13 PROCEDURE — 80053 COMPREHEN METABOLIC PANEL: CPT

## 2023-11-13 PROCEDURE — 82947 ASSAY GLUCOSE BLOOD QUANT: CPT

## 2023-11-13 PROCEDURE — 86923 COMPATIBILITY TEST ELECTRIC: CPT

## 2023-11-13 PROCEDURE — 82553 CREATINE MB FRACTION: CPT

## 2023-11-13 PROCEDURE — 74176 CT ABD & PELVIS W/O CONTRAST: CPT

## 2023-11-13 PROCEDURE — C1894: CPT

## 2023-11-13 PROCEDURE — 72170 X-RAY EXAM OF PELVIS: CPT

## 2023-11-13 PROCEDURE — 84300 ASSAY OF URINE SODIUM: CPT

## 2023-11-13 PROCEDURE — 84295 ASSAY OF SERUM SODIUM: CPT

## 2024-04-18 NOTE — PATIENT PROFILE ADULT - PATIENT'S PREFERRED PRONOUN
[FreeTextEntry1] :   Documented by Gurwinder Holder acting as scribe for Dr. Fatima on 04/18/2024. All Medical record entries made by the Scribe were at my, Dr. Fatima, direction and personally dictated by me on 04/18/2024 . I have reviewed the chart and agree that the record accurately reflects my personal performance of the history, physical exam, assessment and plan. I have also personally directed, reviewed, and agreed with the discharge instructions. Her/She
